# Patient Record
Sex: MALE | Race: WHITE | Employment: OTHER | URBAN - METROPOLITAN AREA
[De-identification: names, ages, dates, MRNs, and addresses within clinical notes are randomized per-mention and may not be internally consistent; named-entity substitution may affect disease eponyms.]

---

## 2017-01-03 ENCOUNTER — HOSPITAL ENCOUNTER (OUTPATIENT)
Dept: LAB | Age: 72
Discharge: HOME | End: 2017-01-03
Payer: MEDICARE

## 2017-01-03 DIAGNOSIS — R97.20 ELEVATED PROSTATE SPECIFIC ANTIGEN (PSA): ICD-10-CM

## 2017-01-03 LAB
ALBUMIN SERPL BCP-MCNC: 3.9 G/DL (ref 3.2–4.6)
ALBUMIN/GLOB SERPL: 1.2 {RATIO} (ref 1.2–3.5)
ALP SERPL-CCNC: 58 U/L (ref 50–136)
ALT SERPL-CCNC: 47 U/L (ref 12–65)
ANION GAP BLD CALC-SCNC: 6 MMOL/L (ref 7–16)
AST SERPL W P-5'-P-CCNC: 23 U/L (ref 15–37)
BASOPHILS # BLD AUTO: 0.1 K/UL (ref 0–0.2)
BASOPHILS # BLD: 1 % (ref 0–2)
BILIRUB SERPL-MCNC: 0.8 MG/DL (ref 0.2–1.1)
BUN SERPL-MCNC: 23 MG/DL (ref 8–23)
CALCIUM SERPL-MCNC: 9.4 MG/DL (ref 8.3–10.4)
CHLORIDE SERPL-SCNC: 105 MMOL/L (ref 98–107)
CO2 SERPL-SCNC: 32 MMOL/L (ref 23–32)
CREAT SERPL-MCNC: 1.05 MG/DL (ref 0.8–1.5)
DIFFERENTIAL METHOD BLD: ABNORMAL
EOSINOPHIL # BLD: 0 K/UL (ref 0–0.8)
EOSINOPHIL NFR BLD: 1 % (ref 0.5–7.8)
ERYTHROCYTE [DISTWIDTH] IN BLOOD BY AUTOMATED COUNT: 12.6 % (ref 11.9–14.6)
GLOBULIN SER CALC-MCNC: 3.2 G/DL (ref 2.3–3.5)
GLUCOSE SERPL-MCNC: 125 MG/DL (ref 65–100)
HCT VFR BLD AUTO: 43.8 % (ref 41.1–50.3)
HGB BLD-MCNC: 14.5 G/DL (ref 13.6–17.2)
LYMPHOCYTES # BLD AUTO: 29 % (ref 13–44)
LYMPHOCYTES # BLD: 1.9 K/UL (ref 0.5–4.6)
MCH RBC QN AUTO: 30.3 PG (ref 26.1–32.9)
MCHC RBC AUTO-ENTMCNC: 33.1 G/DL (ref 31.4–35)
MCV RBC AUTO: 91.6 FL (ref 79.6–97.8)
MONOCYTES # BLD: 0.4 K/UL (ref 0.1–1.3)
MONOCYTES NFR BLD AUTO: 6 % (ref 4–12)
NEUTS SEG # BLD: 4.3 K/UL (ref 1.7–8.2)
NEUTS SEG NFR BLD AUTO: 64 % (ref 43–78)
NRBC # BLD: 0 K/UL (ref 0–0.2)
PLATELET # BLD AUTO: 258 K/UL (ref 150–450)
PMV BLD AUTO: 9.8 FL (ref 10.8–14.1)
POTASSIUM SERPL-SCNC: 3.4 MMOL/L (ref 3.5–5.1)
PROT SERPL-MCNC: 7.1 G/DL (ref 6.3–8.2)
PSA SERPL-MCNC: 0.6 NG/ML
RBC # BLD AUTO: 4.78 M/UL (ref 4.23–5.67)
SODIUM SERPL-SCNC: 143 MMOL/L (ref 136–145)
WBC # BLD AUTO: 6.7 K/UL (ref 4.3–11.1)

## 2017-01-03 PROCEDURE — 85025 COMPLETE CBC W/AUTO DIFF WBC: CPT | Performed by: INTERNAL MEDICINE

## 2017-01-03 PROCEDURE — 36415 COLL VENOUS BLD VENIPUNCTURE: CPT | Performed by: INTERNAL MEDICINE

## 2017-01-03 PROCEDURE — 84153 ASSAY OF PSA TOTAL: CPT | Performed by: INTERNAL MEDICINE

## 2017-01-03 PROCEDURE — 80053 COMPREHEN METABOLIC PANEL: CPT | Performed by: INTERNAL MEDICINE

## 2017-01-12 ENCOUNTER — HOSPITAL ENCOUNTER (OUTPATIENT)
Dept: MRI IMAGING | Age: 72
Discharge: HOME | End: 2017-01-12
Attending: INTERNAL MEDICINE
Payer: MEDICARE

## 2017-01-12 DIAGNOSIS — M25.552 PAIN OF LEFT HIP JOINT: ICD-10-CM

## 2017-01-12 PROCEDURE — 73723 MRI JOINT LWR EXTR W/O&W/DYE: CPT

## 2017-01-12 PROCEDURE — 74011250636 HC RX REV CODE- 250/636: Performed by: INTERNAL MEDICINE

## 2017-01-12 PROCEDURE — A9577 INJ MULTIHANCE: HCPCS | Performed by: INTERNAL MEDICINE

## 2017-01-12 RX ORDER — SODIUM CHLORIDE 0.9 % (FLUSH) 0.9 %
10 SYRINGE (ML) INJECTION
Status: COMPLETED | OUTPATIENT
Start: 2017-01-12 | End: 2017-01-12

## 2017-01-12 RX ADMIN — GADOBENATE DIMEGLUMINE 7 ML: 529 INJECTION, SOLUTION INTRAVENOUS at 08:54

## 2017-01-12 RX ADMIN — Medication 10 ML: at 08:54

## 2017-01-17 ENCOUNTER — HOSPITAL ENCOUNTER (OUTPATIENT)
Dept: LAB | Age: 72
Discharge: HOME | End: 2017-01-17
Payer: MEDICARE

## 2017-01-17 ENCOUNTER — HOSPITAL ENCOUNTER (OUTPATIENT)
Dept: INFUSION THERAPY | Age: 72
Discharge: HOME | End: 2017-01-17
Payer: MEDICARE

## 2017-01-17 DIAGNOSIS — C61 PROSTATE CANCER (HCC): ICD-10-CM

## 2017-01-17 DIAGNOSIS — R97.20 ELEVATED PROSTATE SPECIFIC ANTIGEN (PSA): ICD-10-CM

## 2017-01-17 LAB
ALBUMIN SERPL BCP-MCNC: 3.8 G/DL (ref 3.2–4.6)
ALBUMIN/GLOB SERPL: 1.2 {RATIO} (ref 1.2–3.5)
ALP SERPL-CCNC: 62 U/L (ref 50–136)
ALT SERPL-CCNC: 34 U/L (ref 12–65)
ANION GAP BLD CALC-SCNC: 8 MMOL/L (ref 7–16)
AST SERPL W P-5'-P-CCNC: 34 U/L (ref 15–37)
BASOPHILS # BLD AUTO: 0.1 K/UL (ref 0–0.2)
BASOPHILS # BLD: 1 % (ref 0–2)
BILIRUB SERPL-MCNC: 1.3 MG/DL (ref 0.2–1.1)
BUN SERPL-MCNC: 19 MG/DL (ref 8–23)
CALCIUM SERPL-MCNC: 9.4 MG/DL (ref 8.3–10.4)
CHLORIDE SERPL-SCNC: 106 MMOL/L (ref 98–107)
CO2 SERPL-SCNC: 30 MMOL/L (ref 23–32)
CREAT SERPL-MCNC: 1.06 MG/DL (ref 0.8–1.5)
DIFFERENTIAL METHOD BLD: ABNORMAL
EOSINOPHIL # BLD: 0.1 K/UL (ref 0–0.8)
EOSINOPHIL NFR BLD: 1 % (ref 0.5–7.8)
ERYTHROCYTE [DISTWIDTH] IN BLOOD BY AUTOMATED COUNT: 12.7 % (ref 11.9–14.6)
GLOBULIN SER CALC-MCNC: 3.2 G/DL (ref 2.3–3.5)
GLUCOSE SERPL-MCNC: 106 MG/DL (ref 65–100)
HCT VFR BLD AUTO: 45.7 % (ref 41.1–50.3)
HGB BLD-MCNC: 15 G/DL (ref 13.6–17.2)
LYMPHOCYTES # BLD AUTO: 27 % (ref 13–44)
LYMPHOCYTES # BLD: 1.8 K/UL (ref 0.5–4.6)
MCH RBC QN AUTO: 30.5 PG (ref 26.1–32.9)
MCHC RBC AUTO-ENTMCNC: 32.8 G/DL (ref 31.4–35)
MCV RBC AUTO: 93.1 FL (ref 79.6–97.8)
MONOCYTES # BLD: 0.4 K/UL (ref 0.1–1.3)
MONOCYTES NFR BLD AUTO: 6 % (ref 4–12)
NEUTS SEG # BLD: 4.3 K/UL (ref 1.7–8.2)
NEUTS SEG NFR BLD AUTO: 65 % (ref 43–78)
NRBC # BLD: 0 K/UL (ref 0–0.2)
PLATELET # BLD AUTO: 259 K/UL (ref 150–450)
PMV BLD AUTO: 10 FL (ref 10.8–14.1)
POTASSIUM SERPL-SCNC: 4.2 MMOL/L (ref 3.5–5.1)
PROT SERPL-MCNC: 7 G/DL (ref 6.3–8.2)
PSA SERPL-MCNC: 0.5 NG/ML
RBC # BLD AUTO: 4.91 M/UL (ref 4.23–5.67)
SODIUM SERPL-SCNC: 144 MMOL/L (ref 136–145)
WBC # BLD AUTO: 6.6 K/UL (ref 4.3–11.1)

## 2017-01-17 PROCEDURE — 74011250636 HC RX REV CODE- 250/636: Performed by: INTERNAL MEDICINE

## 2017-01-17 PROCEDURE — 96402 CHEMO HORMON ANTINEOPL SQ/IM: CPT

## 2017-01-17 RX ADMIN — LEUPROLIDE ACETATE 22.5 MG: KIT at 12:10

## 2017-01-17 NOTE — PROGRESS NOTES
Arrived to the [facility]. Lupron completed. Patient tolerated well. Any issues or concerns during appointment:none. Discharged ambulatory.

## 2017-01-17 NOTE — PROGRESS NOTES
Problem: Knowledge Deficit  Goal: *Participate in the learning process  Outcome: Progressing Towards Goal  Reviewed POC.

## 2017-01-27 ENCOUNTER — HOSPITAL ENCOUNTER (OUTPATIENT)
Dept: RADIATION ONCOLOGY | Age: 72
Discharge: HOME | End: 2017-01-27
Payer: MEDICARE

## 2017-01-27 VITALS
HEART RATE: 78 BPM | BODY MASS INDEX: 24.56 KG/M2 | WEIGHT: 156.8 LBS | SYSTOLIC BLOOD PRESSURE: 156 MMHG | TEMPERATURE: 98.6 F | RESPIRATION RATE: 18 BRPM | OXYGEN SATURATION: 99 % | DIASTOLIC BLOOD PRESSURE: 105 MMHG

## 2017-01-27 DIAGNOSIS — C61 PROSTATE CANCER (HCC): Primary | ICD-10-CM

## 2017-01-27 PROCEDURE — 99211 OFF/OP EST MAY X REQ PHY/QHP: CPT

## 2017-01-27 RX ORDER — AMLODIPINE AND BENAZEPRIL HYDROCHLORIDE 10; 20 MG/1; MG/1
1 CAPSULE ORAL DAILY
COMMUNITY
End: 2017-02-10 | Stop reason: ALTCHOICE

## 2017-01-27 NOTE — CONSULTS
Patient: Ridge Novak MRN: [id number]  SSN: [id number]    YOB: 1945  Age: 73 y.o. Sex: male      Other Providers:  Jo Blakely MD, Josse Singh MD    CHIEF COMPLAINT: Prostate cancer    DIAGNOSIS: High risk prostate cancer, GS 4+5=9. 5/12 cores. PSA 11.9    PREVIOUS TREATMENT:  1) Watchful waiting after biopsy 2015  2) Repeat biopsy   3) 10/13/16:  Lupron 22.5 mg    HISTORY OF PRESENT ILLNESS:  Ridge Novak is a 73 y.o. male who I am seeing at the request of Dr. Anne Bell. He is a patient of Dr. Priti Cuellar, previously followed by Dr. Olivia Matute at [facility]. He originally had a diagnosis of prostate cancer in 2015 when a biopsy showed Inglis 6 disease in 1 core, 45%, with a PSA of 4.7, volume 21 cc. He elected for watchful waiting at that time. PSA at initial biopsy 4.7  Feb 2016 6.4  May 2016 5.9  Sept 2016 11.9    In 2016, he transferred care to Dr. Priti Cuellar and PSA devin to 7.34, then to 11.9, prompting repeat biopsy of the prostate that then showed Inglis 4+5 disease. Biopsy showed GS 4+5=9 in 3 cores between 35-45%, 1 core GS 4+4=8 in 35%, and 1 core 4+3=7 in 35% for a total of 5/12 cores. He was also having some right hip pain and pelvic discomfort which prompted systemic restaging. Bone scan revealed some small suspicious uptake in the sternum and in the left proximal femur, with corresponding findings on CT, along with an asymmetrically enlarged right pelvic sidewall internal iliac lymph node measuring 17 mm. These findings were felt to most likely represent metastatic disease. Dr. Priti Cuellar started him on Lupron with a 22.5 mg dose given on 10/3/16. He was referred to medical oncology for evaluation and met with Dr. Anne Bell. He recommended short term observation with repeat CT and bone scan after 3 months to determine whether the lesions responded to ADT, which would suggest stage IV disease. CT showed resolution of pelvic adenopathy but bone lesions were stable.  PSA was 0.6 consistent with excellent biochemical response but he continued to have pain in the left hip still. Dr. Patricia Vasquez was entertaining the possibilty that the bone lesions were not metastases. He chose to obtain MRI of the left hip, and if consistent with a nonneoplastic etiology which was seen 1/12/17, he recommend rad onc referral for consideration of local therapy which is the rationale for our visit today. He is having hot flashes and failed Effexor with changes in his blood pressure. He is able to play golf regularly and would prefer to avoid treatment if possible. GENITOURINARY ROS:  AUA 6. No major issues. He is able to get an erection. PAST MEDICAL HISTORY:    Past Medical History   Diagnosis Date    Elevated prostate specific antigen (PSA) 1/15/2014    Hypertension     Hypertrophy of prostate with urinary obstruction and other lower urinary tract symptoms (LUTS) 1/15/2014       The patient denies history of collagen vascular diseases, pacemaker insertion, prior radiation or prior chemotherapy. PAST SURGICAL HISTORY:   Past Surgical History   Procedure Laterality Date    Hx colonoscopy      Pr prostate biopsy, needle, saturation sampling      Hx other surgical Right      squamous cell CA upper lip    Hx malignant skin lesion excision       removed from neck       MEDICATIONS:     Current Outpatient Prescriptions:     amLODIPine-benazepril (LOTREL) 10-20 mg per capsule, Take 1 Cap by mouth daily. , Disp: , Rfl:     aspirin delayed-release 81 mg tablet, Take  by mouth daily. , Disp: , Rfl:     zolpidem (AMBIEN) 5 mg tablet, Take  by mouth nightly as needed for Sleep., Disp: , Rfl:     LORazepam (ATIVAN) 1 mg tablet, Take 1 Tab by mouth every six (6) hours as needed for Anxiety. Max Daily Amount: 4 mg.  Indications: ANXIETY, Disp: 50 Tab, Rfl: 0    ranitidine (ZANTAC) 150 mg tablet, , Disp: , Rfl: 1    GLUC ALLEN/CHONDRO ALLEN A/VIT C/MN (GLUCOSAMINE 1500 COMPLEX PO), Take  by mouth., Disp: , Rfl:    Hydrochlorothiazide 12.5 mg tablet, Take 12.5 mg by mouth daily. , Disp: , Rfl:     MULTIVITAMIN WITH MINERALS (MULTIPLE VITAMIN-MINERALS PO), Take  by mouth., Disp: , Rfl:     ALLERGIES:   No Known Allergies    SOCIAL HISTORY:   Social History     Social History    Marital status:      Spouse name: N/A    Number of children: N/A    Years of education: N/A     Occupational History    Not on file. Social History Main Topics    Smoking status: Never Smoker    Smokeless tobacco: Never Used    Alcohol use 0.0 oz/week     0 Standard drinks or equivalent per week    Drug use: Not on file    Sexual activity: Not on file     Other Topics Concern    Not on file     Social History Narrative       FAMILY HISTORY:   Family History   Problem Relation Age of Onset    Hypertension Other      gen fam HX       REVIEW OF SYSTEMS: Please see the completed review of systems sheet in the chart that I have reviewed today. PHYSICAL EXAMINATION:   ECOG Performance status 1  VITAL SIGNS:   Visit Vitals    BP (!) 156/105    Pulse 78    Temp 98.6 °F (37 °C)    Resp 18    Wt 71.1 kg (156 lb 12.8 oz)    SpO2 99%    BMI 24.56 kg/m2        GENERAL: The patient is well-developed, ambulatory, alert and in no acute distress. HEENT: Head is normocephalic, atraumatic. Pupils are equal, round and reactive to light and accommodation. Extraocular movement intact. Hearing is intact bilaterally to finger rub. Oral cavity reveals no lesions. Mucous membranes are moist. NECK: Neck is supple with no masses. CARDIOVASCULAR: Heart is regular rate and rhythm. There are no murmurs rubs or gallups. Radial pulses are 2+ RESPIRATORY: Lungs are clear to auscultation and percussion. There is normal respiratory effort. GASTROINTESTINAL: The abdomen is soft, non-tender, nondistended with no hepatospelnomagaly. Digital rectal examination: Smooth prostate gland and rectal vault with anodular prostate.  LYMPHATIC: There is no cervical, supraclavicular or axillary lymphadenopathy bilaterally. MUSCULOSKELETAL: Extremities reveal no cyanosis, clubbing or edema.  is 5+/5. NEURO:  Cranial nerves II-XII grossly intact. Muscular strength and sensation are intact throughout all four extremities. PATHOLOGY:    9/19/16:      DIAGNOSIS   A: \"PROSTATE, LEFT LATERAL BASE, BIOPSY\": PROSTATIC TISSUE WITH CHRONIC INFLAMMATION. B: \"PROSTATE, LEFT LATERAL MID, BIOPSY\": PROSTATIC TISSUE WITH CHRONIC INFLAMMATION. C: \"PROSTATE, LEFT LATERAL APEX, BIOPSY\": PROSTATIC TISSUE WITH FOCAL ACUTE AND CHRONIC INFLAMMATION. D: \"PROSTATE, LEFT BASE, BIOPSY\": PROSTATIC TISSUE WITH CHRONIC INFLAMMATION. E: \"PROSTATE, LEFT MID, BIOPSY\": PROSTATITIC TISSUE WITH FOCAL ACUTE AND CHRONIC INFLAMMATION. F: \"PROSTATE, LEFT APEX, BIOPSY\": PROSTATIC TISSUE, NO CARCINOMA IDENTIFIED. G: \"PROSTATE, RIGHT BASE, BIOPSY\": PROSTATIC ADENOCARCINOMA, DEBBIE SCORE   4 + 5 = 9, INVOLVING 40% OF BIOPSY. PERINEURAL INVASION PRESENT. H: \"PROSTATE, RIGHT MID, BIOPSY\": PROSTATIC ADENOCARCINOMA, DEBBIE SCORE   4 + 4 = 8, INVOLVING 35% OF BIOPSY. I: \"PROSTATE, RIGHT APEX, BIOPSY\": PROSTATIC TISSUE WITH A SMALL FOCUS OF ATYPICAL GLANDS SUSPICIOUS FOR CARCINOMA. J: \"PROSTATE, RIGHT LATERAL BASE, BIOPSY\": PROSTATIC ADENOCARCINOMA, DEBBIE SCORE 4 + 5 = 9, INVOLVING 45% OF BIOPSY. SUSPICIOUS FOR PERINEURAL INVASION. K: \"PROSTATE, RIGHT LATERAL MID, BIOPSY\": PROSTATIC ADENOCARCINOMA, DEBBIE SCORE 4 + 5 = 9, INVOLVING 45% OF BIOPSY. L: \"PROSTATE, RIGHT LATERAL APEX, BIOPSY\": PROSTATIC ADENOCARCINOMA, DEBBIE SCORE 4 + 3 = 7, INVOLVING 35% OF BIOPSY.        LABORATORY:   Lab Results   Component Value Date/Time    Sodium 144 01/17/2017 10:37 AM    Potassium 4.2 01/17/2017 10:37 AM    Chloride 106 01/17/2017 10:37 AM    CO2 30 01/17/2017 10:37 AM    Anion gap 8 01/17/2017 10:37 AM    Glucose 106 01/17/2017 10:37 AM    BUN 19 01/17/2017 10:37 AM    Creatinine 1.06 01/17/2017 10:37 AM    GFR est AA >60 01/17/2017 10:37 AM    GFR est non-AA >60 01/17/2017 10:37 AM    Calcium 9.4 01/17/2017 10:37 AM    Albumin 3.8 01/17/2017 10:37 AM    Protein, total 7.0 01/17/2017 10:37 AM    Globulin 3.2 01/17/2017 10:37 AM    A-G Ratio 1.2 01/17/2017 10:37 AM    AST 34 01/17/2017 10:37 AM    ALT 34 01/17/2017 10:37 AM     Lab Results   Component Value Date/Time    WBC 6.6 01/17/2017 10:37 AM    HGB 15.0 01/17/2017 10:37 AM    HCT 45.7 01/17/2017 10:37 AM    PLATELET 761 10/93/7162 10:37 AM       RADIOLOGY:    Nm Bone Scan Wh Body    Result Date: 12/27/2016  Whole-body bone scan INDICATION:  Restaging prostate cancer Whole-body images were obtained after intravenous injection of 26.4 mCi of technetium HDP. COMPARISON:  09/29/2016 FINDINGS:  There is stable focal uptake in the intertrochanteric left hip. Uptake in the midsternum is also stable. There are no developing lesions in the ribs or spine. IMPRESSION:  1.  Stable uptake in the left hip, concerning for metastatic disease. CT appearance is nonspecific, this could also represent a benign chondroid lesion. 2.  Stable uptake in the midsternum. This is also indeterminate, benign uptake can be seen in this region. Mri Hip Ardyth Labs Wo Cont    Result Date: 1/12/2017  History: Severe left hip pain. History of prostate cancer diagnosed one year ago. EXAM: MRI left hip with and without contrast TECHNIQUE: Multiplanar multisequence imaging is performed both before and after the uneventful ministration of 7 cc MultiHance. COMPARISON: Bone scan dated 12/27/2016 FINDINGS: The abnormal focus of uptake on the recent bone scan corresponds to a lobular area of increased T2 signal within the posterior aspect of the intertrochanteric portion of the left femur. This lesion most likely represents an enchondroma. No additional focal marrow lesions demonstrated. Degenerative disc signal noted in the low lumbar spine and pubic symphysis.  There is normal signal at the common hamstring tendon origin and at the gluteal muscular insertion. There is a tear of the anterior superior acetabular labrum. No abnormal fluid collection or soft tissue mass. IMPRESSION: 1. The abnormality seen on the bone scan corresponds to an enchondroma within the intertrochanteric portion of the left femur posteriorly. 2. Tear of the anterior superior acetabular labrum on the left. 3. Degenerative changes of the low lumbar spine and pubic symphysis. 4. No osseous metastases seen within the left hip or pelvis. Ct Pelv W Cont    Addendum Date: 12/27/2016    Addendum: Addendum: The sclerotic, intertrochanteric left hip lesion is stable. CT appearance is nonspecific. While this could represent a bony metastatic lesion, could also represent in benign chondroid lesion. Result Date: 12/27/2016  CT of the pelvis INDICATION:  Prostate cancer Multiple axial images were obtained through the pelvis after intravenous injection of 100mL of Isovue 370. Radiation dose reduction techniques were used for this study: All CT scans performed at this facility use one or all of the following: Automated exposure control, adjustment of the mA and/or kVp according to patient's size, iterative reconstruction. Compared with 09/29/2016. FINDINGS: The right pelvic sidewall lymph node described on the prior exam has completely resorbed. No residual mass or adenopathy is seen in the pelvis. The prostate is stable in size and appearance. No discrete mass is seen. There are no inflammatory changes or fluid collections in the pelvis. There are no significant bony lesions. IMPRESSION: Interval resolution of right pelvic adenopathy     IMPRESSION:  Brad Lewis is a 73 y.o. male with initially felt to be metastatic prostate cancer but at this point with no changes in his bone findings, more consistent with high risk prostate cancer. The natural history of prostate cancer was reviewed with the patient.  Prognostic features including stage, Michelle score, age, race, and PSA were reviewed. Treatment options for prostate cancer including active surveillance, hormonal therapy, radiotherapy, and surgery were compared and contrasted with regard to outcome and quality of life. I discussed the radiotherapy options including low-dose rate brachytherapy, high-dose-rate brachytherapy, and external beam radiation therapy. We also discussed the addition of hormone therapy. Side effects and complications of radiation therapy including fatigue, urinary obstructive symptoms, rectal irritation and bleeding, and decline of sexual function were among the complications highlighted. He asked many questions which were answered to his satisfaction. For high risk disease, there are several viable treatment options but I generally do not advise active surveillance. Surgery with radical prostatectomy or IMRT examination with a protracted course of androgen deprivation therapy (2-3 years) are appropriate options. I advised the patient each of these options offer similar tumor control with differences found mainly in varying toxicity profiles. IMRT is given as a single modality treatment or as initial treatment prior to an HDR boost.  A total of 45 Gy would be delivered to the pelvis and prostate gland followed by a 15 gray in 1 fraction HDR boost to the prostate gland alone. For patients who are not candidates for brachytherapy either by poor anatomy meaning substantial arch interference or a prostate gland outside of 20-60 cc where good dosimetry is often not achievable, IMRT is given for the entirety of the treatment course. Unfortunately we would not be able to boost his lymph node region so HDR would not be appropriate for him. For these reasons I feel fractionated IMRT should be delivered.   We also discussed the new and emerging data on SpaceOAR or Augmenix which is a water based gel placed between the rectum and prostate which has been shown in clinical trials to spare GI toxicity. This is placed at time of fiducial markers. I would like to complete staging MRI to see if he is a candidate for spaceOAR then have this placed if amenable. Following this procedure, we would proceed with fractionated radiation over 9 weeks. He was agreeable with that plan. I will see him back in 2 weeks after MRI and presentation in conference. PLAN:    1) Discussed viable treatment options focusing on external beam radiation highlighting the risks, benefits, and side effects from treatment. 2) Reviewed available research treatment and cancer care protocols for which patient may be eligible. Unfortunately there are no matching clinical trials available at this time. 3) Further staging to include MRI to see if he is a candidate for spaceOAR. 4) Patient will be scheduled for follow up after presentation in tumor board and subsequent staging scans before making a treatment decision.        Merle Hoskins MD   January 27, 2017

## 2017-01-27 NOTE — PROGRESS NOTES
Pt here for initial RT visit for prostate cancer. According to the MD notes, pt is not interested in surgery. Lupron was started on 10/3/16. His 1/17/17 PSA was 0.5. Pt completed the AUA symptom  with a score of 6, and is c/o mild urinary symptoms of frequency, hesitancy, weak stream, and nocturia. Pt will have an MRI Pelvis, be presented at [address] [place] [address], and return on 2/10/17 to discuss treatment options, including the possibility of SpaceOAR. Pt is aware of the [facility] appt, and that he will be called by a  for his MRI.

## 2017-02-01 ENCOUNTER — HOSPITAL ENCOUNTER (OUTPATIENT)
Dept: MRI IMAGING | Age: 72
Discharge: HOME | End: 2017-02-01
Attending: RADIOLOGY
Payer: MEDICARE

## 2017-02-01 DIAGNOSIS — C61 PROSTATE CANCER (HCC): ICD-10-CM

## 2017-02-01 PROCEDURE — 72197 MRI PELVIS W/O & W/DYE: CPT

## 2017-02-01 PROCEDURE — A9577 INJ MULTIHANCE: HCPCS | Performed by: RADIOLOGY

## 2017-02-01 PROCEDURE — 74011000258 HC RX REV CODE- 258: Performed by: RADIOLOGY

## 2017-02-01 PROCEDURE — 74011250636 HC RX REV CODE- 250/636: Performed by: RADIOLOGY

## 2017-02-01 RX ORDER — SODIUM CHLORIDE 0.9 % (FLUSH) 0.9 %
10 SYRINGE (ML) INJECTION
Status: COMPLETED | OUTPATIENT
Start: 2017-02-01 | End: 2017-02-01

## 2017-02-01 RX ADMIN — GADOBENATE DIMEGLUMINE 10 ML: 529 INJECTION, SOLUTION INTRAVENOUS at 10:27

## 2017-02-01 RX ADMIN — SODIUM CHLORIDE 100 ML: 900 INJECTION, SOLUTION INTRAVENOUS at 10:27

## 2017-02-01 RX ADMIN — Medication 10 ML: at 10:27

## 2017-02-10 ENCOUNTER — APPOINTMENT (OUTPATIENT)
Dept: RADIATION ONCOLOGY | Age: 72
End: 2017-02-10

## 2017-02-10 ENCOUNTER — HOSPITAL ENCOUNTER (OUTPATIENT)
Dept: RADIATION ONCOLOGY | Age: 72
Discharge: HOME | End: 2017-02-10
Payer: MEDICARE

## 2017-02-10 VITALS
OXYGEN SATURATION: 100 % | WEIGHT: 158.2 LBS | SYSTOLIC BLOOD PRESSURE: 154 MMHG | BODY MASS INDEX: 24.78 KG/M2 | HEART RATE: 59 BPM | DIASTOLIC BLOOD PRESSURE: 97 MMHG | TEMPERATURE: 98 F

## 2017-02-10 DIAGNOSIS — C61 PROSTATE CANCER (HCC): Primary | ICD-10-CM

## 2017-02-10 PROCEDURE — 77470 SPECIAL RADIATION TREATMENT: CPT

## 2017-02-10 PROCEDURE — 99211 OFF/OP EST MAY X REQ PHY/QHP: CPT

## 2017-02-10 RX ORDER — HYDROCHLOROTHIAZIDE 25 MG/1
12.5 TABLET ORAL
COMMUNITY
End: 2017-07-05 | Stop reason: ALTCHOICE

## 2017-02-10 RX ORDER — CARVEDILOL 6.25 MG/1
6.25 TABLET ORAL 2 TIMES DAILY WITH MEALS
COMMUNITY
End: 2017-07-05 | Stop reason: ALTCHOICE

## 2017-02-10 NOTE — NURSE NAVIGATOR
Pt here for f/u for Prostate cancer. Lupron injection 10-13-16, 1-17-17. S/p MDC. MRI pelvis  2-1-17-negative for mets. F/u Dr. Yoko Garcia 2-23-17. F/u Dr. Katie Hayward 4-18-17.     Rosamaria Craft, RN

## 2017-02-10 NOTE — PROGRESS NOTES
Patient: Matteo Humphrey MRN: [id number]  SSN: [id number]    YOB: 1945  Age: 73 y.o. Sex: male      Other Providers:  Marcie Elliott MD, Dennis Clifton MD    DIAGNOSIS: High risk prostate cancer, GS 4+5=9. 5/12 cores. PSA 11.9    PREVIOUS TREATMENT:  1) Watchful waiting after biopsy 2015  2) Repeat biopsy   3) 10/13/16:  Lupron 22.5 mg    HISTORY OF PRESENT ILLNESS:  Matteo Humphrey is a 73 y.o. male who I am seeing at the request of Dr. Cristela Montalvo after original consultation 1/27/17. He is a patient of Dr. Wilma Nobles, previously followed by Dr. Vinod Beck at [facility]. He originally had a diagnosis of prostate cancer in 2015 when a biopsy showed Magnolia 6 disease in 1 core, 45%, with a PSA of 4.7, volume 21 cc. He elected for watchful waiting at that time. PSA at initial biopsy 4.7  Feb 2016 6.4  May 2016 5.9  Sept 2016 11.9    In 2016, he transferred care to Dr. Wilma Nobles and PSA devin to 7.34, then to 11.9, prompting repeat biopsy of the prostate that then showed Magnolia 4+5 disease. Biopsy showed GS 4+5=9 in 3 cores between 35-45%, 1 core GS 4+4=8 in 35%, and 1 core 4+3=7 in 35% for a total of 5/12 cores. He was also having some right hip pain and pelvic discomfort which prompted systemic restaging. Bone scan revealed some small suspicious uptake in the sternum and in the left proximal femur, with corresponding findings on CT, along with an asymmetrically enlarged right pelvic sidewall internal iliac lymph node measuring 17 mm. These findings were felt to most likely represent metastatic disease. Dr. Wilma Nobles started him on Lupron with a 22.5 mg dose given on 10/3/16. He was referred to medical oncology for evaluation and met with Dr. Cristela Montalvo. He recommended short term observation with repeat CT and bone scan after 3 months to determine whether the lesions responded to ADT, which would suggest stage IV disease. CT showed resolution of pelvic adenopathy but bone lesions were stable.  PSA was 0.6 consistent with excellent biochemical response but he continued to have pain in the left hip still. Dr. Chavis Friday was entertaining the possibilty that the bone lesions were not metastases. He chose to obtain MRI of the left hip, and if consistent with a nonneoplastic etiology which was seen 1/12/17, he recommend rad onc referral for consideration of local therapy. At that time, he was having hot flashes and failed Effexor with changes in his blood pressure. He wasable to play golf regularly and would prefer to avoid treatment if possible. These sentiments are the same today. I did recommend treatment and presented him in conference with the board in agreement. He is back today to continue the discussion and plan for radiation. GENITOURINARY ROS:  AUA 6 pretreatment. No major issues. He is able to get an erection. PAST MEDICAL HISTORY:    Past Medical History   Diagnosis Date    Elevated prostate specific antigen (PSA) 1/15/2014    Hypertension     Hypertrophy of prostate with urinary obstruction and other lower urinary tract symptoms (LUTS) 1/15/2014       The patient denies history of collagen vascular diseases, pacemaker insertion, prior radiation or prior chemotherapy. PAST SURGICAL HISTORY:   Past Surgical History   Procedure Laterality Date    Hx colonoscopy      Pr prostate biopsy, needle, saturation sampling      Hx other surgical Right      squamous cell CA upper lip    Hx malignant skin lesion excision       removed from neck       MEDICATIONS:     Current Outpatient Prescriptions:     hydroCHLOROthiazide (HYDRODIURIL) 25 mg tablet, Take 25 mg by mouth daily. , Disp: , Rfl:     carvedilol (COREG) 6.25 mg tablet, Take  by mouth two (2) times daily (with meals). , Disp: , Rfl:     aspirin delayed-release 81 mg tablet, Take  by mouth daily. , Disp: , Rfl:     zolpidem (AMBIEN) 5 mg tablet, Take  by mouth nightly as needed for Sleep., Disp: , Rfl:     LORazepam (ATIVAN) 1 mg tablet, Take 1 Tab by mouth every six (6) hours as needed for Anxiety. Max Daily Amount: 4 mg. Indications: ANXIETY (Patient taking differently: Take 0.5 mg by mouth every six (6) hours as needed for Anxiety. Indications: ANXIETY), Disp: 50 Tab, Rfl: 0    ranitidine (ZANTAC) 150 mg tablet, Take 150 mg by mouth nightly., Disp: , Rfl: 1    GLUC ALLEN/CHONDRO ALLEN A/VIT C/MN (GLUCOSAMINE 1500 COMPLEX PO), Take 1 Tab by mouth daily. , Disp: , Rfl:     MULTIVITAMIN WITH MINERALS (MULTIPLE VITAMIN-MINERALS PO), Take 0.5 Tabs by mouth daily. , Disp: , Rfl:     ALLERGIES:   No Known Allergies    SOCIAL HISTORY:   Social History     Social History    Marital status:      Spouse name: N/A    Number of children: N/A    Years of education: N/A     Occupational History    Not on file. Social History Main Topics    Smoking status: Former Smoker     Types: Cigars    Smokeless tobacco: Never Used    Alcohol use 0.0 oz/week     0 Standard drinks or equivalent per week    Drug use: Not on file    Sexual activity: Not on file     Other Topics Concern    Not on file     Social History Narrative       FAMILY HISTORY:   Family History   Problem Relation Age of Onset    Hypertension Other      gen fam HX       REVIEW OF SYSTEMS: Please see the completed review of systems sheet in the chart that I have reviewed today. PHYSICAL EXAMINATION:   ECOG Performance status 1  VITAL SIGNS:   Visit Vitals    BP (!) 154/97 (BP 1 Location: Left arm, BP Patient Position: Sitting)    Pulse (!) 59    Temp 98 °F (36.7 °C) (Oral)    Wt 71.8 kg (158 lb 3.2 oz)    SpO2 100%    BMI 24.78 kg/m2        GENERAL: The patient is well-developed, ambulatory, alert and in no acute distress. HEENT: Head is normocephalic, atraumatic. Pupils are equal, round and reactive to light and accommodation. Extraocular movement intact. Hearing is intact bilaterally to finger rub. Oral cavity reveals no lesions.  Mucous membranes are moist. NECK: Neck is supple with no masses. CARDIOVASCULAR: Heart is regular rate and rhythm. There are no murmurs rubs or gallups. Radial pulses are 2+ RESPIRATORY: Lungs are clear to auscultation and percussion. There is normal respiratory effort. GASTROINTESTINAL: The abdomen is soft, non-tender, nondistended with no hepatospelnomagaly. Digital rectal examination: COPIED FROM PRIOR - Smooth prostate gland and rectal vault with anodular prostate. PATHOLOGY:    9/19/16:      DIAGNOSIS   A: \"PROSTATE, LEFT LATERAL BASE, BIOPSY\": PROSTATIC TISSUE WITH CHRONIC INFLAMMATION. B: \"PROSTATE, LEFT LATERAL MID, BIOPSY\": PROSTATIC TISSUE WITH CHRONIC INFLAMMATION. C: \"PROSTATE, LEFT LATERAL APEX, BIOPSY\": PROSTATIC TISSUE WITH FOCAL ACUTE AND CHRONIC INFLAMMATION. D: \"PROSTATE, LEFT BASE, BIOPSY\": PROSTATIC TISSUE WITH CHRONIC INFLAMMATION. E: \"PROSTATE, LEFT MID, BIOPSY\": PROSTATITIC TISSUE WITH FOCAL ACUTE AND CHRONIC INFLAMMATION. F: \"PROSTATE, LEFT APEX, BIOPSY\": PROSTATIC TISSUE, NO CARCINOMA IDENTIFIED. G: \"PROSTATE, RIGHT BASE, BIOPSY\": PROSTATIC ADENOCARCINOMA, DEBBIE SCORE   4 + 5 = 9, INVOLVING 40% OF BIOPSY. PERINEURAL INVASION PRESENT. H: \"PROSTATE, RIGHT MID, BIOPSY\": PROSTATIC ADENOCARCINOMA, DEBBIE SCORE   4 + 4 = 8, INVOLVING 35% OF BIOPSY. I: \"PROSTATE, RIGHT APEX, BIOPSY\": PROSTATIC TISSUE WITH A SMALL FOCUS OF ATYPICAL GLANDS SUSPICIOUS FOR CARCINOMA. J: \"PROSTATE, RIGHT LATERAL BASE, BIOPSY\": PROSTATIC ADENOCARCINOMA, DEBBIE SCORE 4 + 5 = 9, INVOLVING 45% OF BIOPSY. SUSPICIOUS FOR PERINEURAL INVASION. K: \"PROSTATE, RIGHT LATERAL MID, BIOPSY\": PROSTATIC ADENOCARCINOMA, DEBBIE SCORE 4 + 5 = 9, INVOLVING 45% OF BIOPSY. L: \"PROSTATE, RIGHT LATERAL APEX, BIOPSY\": PROSTATIC ADENOCARCINOMA, DEBBIE SCORE 4 + 3 = 7, INVOLVING 35% OF BIOPSY.        LABORATORY:   Lab Results   Component Value Date/Time    Sodium 144 01/17/2017 10:37 AM    Potassium 4.2 01/17/2017 10:37 AM    Chloride 106 01/17/2017 10:37 AM    CO2 30 01/17/2017 10:37 AM    Anion gap 8 01/17/2017 10:37 AM    Glucose 106 01/17/2017 10:37 AM    BUN 19 01/17/2017 10:37 AM    Creatinine 1.06 01/17/2017 10:37 AM    GFR est AA >60 01/17/2017 10:37 AM    GFR est non-AA >60 01/17/2017 10:37 AM    Calcium 9.4 01/17/2017 10:37 AM    Albumin 3.8 01/17/2017 10:37 AM    Protein, total 7.0 01/17/2017 10:37 AM    Globulin 3.2 01/17/2017 10:37 AM    A-G Ratio 1.2 01/17/2017 10:37 AM    AST (SGOT) 34 01/17/2017 10:37 AM    ALT (SGPT) 34 01/17/2017 10:37 AM     Lab Results   Component Value Date/Time    WBC 6.6 01/17/2017 10:37 AM    HGB 15.0 01/17/2017 10:37 AM    HCT 45.7 01/17/2017 10:37 AM    PLATELET 703 57/78/8910 10:37 AM       RADIOLOGY:    Nm Bone Scan Wh Body    Result Date: 12/27/2016  Whole-body bone scan INDICATION:  Restaging prostate cancer Whole-body images were obtained after intravenous injection of 26.4 mCi of technetium HDP. COMPARISON:  09/29/2016 FINDINGS:  There is stable focal uptake in the intertrochanteric left hip. Uptake in the midsternum is also stable. There are no developing lesions in the ribs or spine. IMPRESSION:  1.  Stable uptake in the left hip, concerning for metastatic disease. CT appearance is nonspecific, this could also represent a benign chondroid lesion. 2.  Stable uptake in the midsternum. This is also indeterminate, benign uptake can be seen in this region. Mri Hip Simon Elias Wo Cont    Result Date: 1/12/2017  History: Severe left hip pain. History of prostate cancer diagnosed one year ago. EXAM: MRI left hip with and without contrast TECHNIQUE: Multiplanar multisequence imaging is performed both before and after the uneventful ministration of 7 cc MultiHance. COMPARISON: Bone scan dated 12/27/2016 FINDINGS: The abnormal focus of uptake on the recent bone scan corresponds to a lobular area of increased T2 signal within the posterior aspect of the intertrochanteric portion of the left femur.  This lesion most likely represents an enchondroma. No additional focal marrow lesions demonstrated. Degenerative disc signal noted in the low lumbar spine and pubic symphysis. There is normal signal at the common hamstring tendon origin and at the gluteal muscular insertion. There is a tear of the anterior superior acetabular labrum. No abnormal fluid collection or soft tissue mass. IMPRESSION: 1. The abnormality seen on the bone scan corresponds to an enchondroma within the intertrochanteric portion of the left femur posteriorly. 2. Tear of the anterior superior acetabular labrum on the left. 3. Degenerative changes of the low lumbar spine and pubic symphysis. 4. No osseous metastases seen within the left hip or pelvis. Ct Pelv W Cont    Addendum Date: 12/27/2016    Addendum: Addendum: The sclerotic, intertrochanteric left hip lesion is stable. CT appearance is nonspecific. While this could represent a bony metastatic lesion, could also represent in benign chondroid lesion. Result Date: 12/27/2016  CT of the pelvis INDICATION:  Prostate cancer Multiple axial images were obtained through the pelvis after intravenous injection of 100mL of Isovue 370. Radiation dose reduction techniques were used for this study: All CT scans performed at this facility use one or all of the following: Automated exposure control, adjustment of the mA and/or kVp according to patient's size, iterative reconstruction. Compared with 09/29/2016. FINDINGS: The right pelvic sidewall lymph node described on the prior exam has completely resorbed. No residual mass or adenopathy is seen in the pelvis. The prostate is stable in size and appearance. No discrete mass is seen. There are no inflammatory changes or fluid collections in the pelvis. There are no significant bony lesions.      IMPRESSION: Interval resolution of right pelvic adenopathy     MRI Prostate without and with Contrast 2/1/2017     Indication: Prostate cancer staging     Comparison: Bone scan 12/27/2016. CT pelvis 12/27/2016.     Technique: Multiplanar T2, diffusion, pre and post contrast T1 and multi-phase  dynamic post contrast fat-suppressed T1 sequences. 10 mL Multihance iv  contrast given. Patient's creatinine 1.0.     Findings:  Prostate size: The prostate gland does not measure significantly enlarged: 4.0  cm craniocaudal, 3.6 cm transverse and 3.1 cm AP.     Peripheral Zone: Both the T2 and ADC imaging there is diffusely decreased signal  intensity bilaterally within the peripheral zone, no discreet focus is prominent  on the ADC images. Note is made of some artifact from central zone  calcifications. On the postcontrast imaging early arterial hyperenhancement is  slightly prominent on the left at the mid gland and apex posterior laterally.     Central Zone: No discreet abnormality.     Prostate Capsule: No evidence for capsular invasion.      Neurovascular Bundles: Intact     Seminal Vesicles: Symmetric in size, both somewhat atrophic, they show normal  signal with no abnormal enhancement.     Lymph Nodes: No pathologically enlarged pelvic lymph node is seen.     Bones: No new suspicious skeletal lesion, there is an incidental hemangioma  within L3 vertebrae.     Extra- Prostate Findings: The bladder and rectum are unremarkable. No acute  musculoskeletal finding or vascular lesion.     IMPRESSION  IMPRESSION: The gland is not significantly enlarged. There is diffuse signal  abnormality suggestive of tumor bilaterally throughout the peripheral zone,  mildly more aggressive appearing due to the presence of early hyperenhancement  posterior laterally at the left mid gland and apex. No evidence of extracapsular extension or on this exam metastatic disease. IMPRESSION:  Jimy James is a 73 y.o. male with initially felt to be metastatic prostate cancer but at this point with no changes in his bone findings, more consistent with high risk prostate cancer.   The natural history of prostate cancer was again reviewed with the patient. Prognostic features including stage, [employer] score, age, race, and PSA were reviewed. Treatment options for prostate cancer including active surveillance, hormonal therapy, radiotherapy, and surgery were compared and contrasted with regard to outcome and quality of life. I discussed the radiotherapy options including low-dose rate brachytherapy, high-dose-rate brachytherapy, and external beam radiation therapy. We also discussed the addition of hormone therapy. Side effects and complications of radiation therapy including fatigue, urinary obstructive symptoms, rectal irritation and bleeding, and decline of sexual function were among the complications highlighted. He asked many questions which were answered to his satisfaction. As outlined previously, I feel fractionated IMRT should be delivered. We also discussed the new and emerging data on SpaceOAR or Augmenix which is a water based gel placed between the rectum and prostate which has been shown in clinical trials to spare GI toxicity. This is placed at time of fiducial markers. I wished to complete staging MRI to see if he is a candidate for spaceOAR then have this placed if amenable which does appear to be the case after the MRI. Following this procedure, we would proceed with fractionated radiation over 9 weeks. He was agreeable with that plan. He will be scheduled for SpaceOAR 3/1/17 and then simulation shortly after. PLAN:    1) Discussed viable treatment options focusing on external beam radiation highlighting the risks, benefits, and side effects from treatment. 2) Reviewed available research treatment and cancer care protocols for which patient may be eligible. Unfortunately there are no matching clinical trials available at this time. 3) Further evaluation and preparation to include spaceOAR. 4) Patient will be scheduled for follow up after Augmenix and fiducials for simulation.       Portions of this note were copied from prior encounters and reviewed for accuracy, currency, and represent documentation and tasks completed during this encounter. I verify and attest these portions to be unchanged from prior visits.     Kevin Moore MD  02/10/17

## 2017-02-28 ENCOUNTER — ANESTHESIA EVENT (OUTPATIENT)
Dept: SURGERY | Age: 72
End: 2017-02-28
Payer: MEDICARE

## 2017-02-28 NOTE — H&P
Patient: Lien Worrell MRN: [id number]  SSN: [id number]    YOB: 1945  Age: 73 y.o. Sex: male      Other Providers:  Taqueria Martinez MD, Micheal Cotto MD, Pancho Prince MD    DIAGNOSIS: High risk prostate cancer, GS 4+5=9. 5/12 cores. PSA 11.9    PREVIOUS TREATMENT:  1) Watchful waiting after biopsy 2015  2) Repeat biopsy   3) 10/13/16:  Lupron 22.5 mg    HISTORY OF PRESENT ILLNESS:  Lien Worrell is a 73 y.o. male who I am seeing at the request of Dr. Elma Del Angel for placement of fiducial markers and SpaceOAR hydrogel. He is a patient of Dr. Ivett Giles, previously followed by Dr. Urmila Marie at [address] [place] [address]. He originally had a diagnosis of prostate cancer in 2015 when a biopsy showed [place] 6 disease in 1 core, 45%, with a PSA of 4.7, volume 21 cc. He elected for watchful waiting at that time. PSA at initial biopsy 4.7  Feb 2016 6.4  May 2016 5.9  Sept 2016 11.9    In 2016, he transferred care to Dr. Ivett Giles and PSA devin to 7.34, then to 11.9, prompting repeat biopsy of the prostate that then showed [place] 4+5 disease. Biopsy showed GS 4+5=9 in 3 cores between 35-45%, 1 core GS 4+4=8 in 35%, and 1 core 4+3=7 in 35% for a total of 5/12 cores. He was also having some right hip pain and pelvic discomfort which prompted systemic restaging. Bone scan revealed some small suspicious uptake in the sternum and in the left proximal femur, with corresponding findings on CT, along with an asymmetrically enlarged right pelvic sidewall internal iliac lymph node measuring 17 mm. These findings were felt to most likely represent metastatic disease. Dr. Ivett Giles started him on Lupron with a 22.5 mg dose given on 10/3/16. He was referred to medical oncology for evaluation and met with Dr. Riley Fam. He recommended short term observation with repeat CT and bone scan after 3 months to determine whether the lesions responded to ADT, which would suggest stage IV disease.  CT showed resolution of pelvic adenopathy but bone lesions were stable. PSA was 0.6 consistent with excellent biochemical response but he continued to have pain in the left hip still. Dr. Jori Red was entertaining the possibilty that the bone lesions were not metastases. He chose to obtain MRI of the left hip, and if consistent with a nonneoplastic etiology which was seen 1/12/17, he recommend rad onc referral for consideration of local therapy. At that time, he was having hot flashes and failed Effexor with changes in his blood pressure. He was able to play golf regularly and would prefer to avoid treatment if possible. He has decided to move forward with radiation therapy. He has also decided to include placement of SpaceOAR hydrogel for protection of the rectal wall along with placement of fiducial markers for image guidance. He will undergo this procedure today. GENITOURINARY ROS:  AUA 6 pretreatment. No major issues. He is able to get an erection. PAST MEDICAL HISTORY:    Past Medical History:   Diagnosis Date    Anxiety     Arthritis     Elevated prostate specific antigen (PSA) 1/15/2014    Former cigar smoker     GERD (gastroesophageal reflux disease)     Hypertension     Hypertrophy of prostate with urinary obstruction and other lower urinary tract symptoms (LUTS) 1/15/2014    Prostate cancer [facility])        The patient denies history of collagen vascular diseases, pacemaker insertion, prior radiation or prior chemotherapy.      PAST SURGICAL HISTORY:   Past Surgical History:   Procedure Laterality Date    HX COLONOSCOPY      HX MALIGNANT SKIN LESION EXCISION      squamous cell - 2-3 times    HX TONSILLECTOMY  as child    TX PROSTATE BIOPSY, NEEDLE, SATURATION SAMPLING         MEDICATIONS:     Current Facility-Administered Medications:     lidocaine (XYLOCAINE) 10 mg/mL (1 %) injection 0.1 mL, 0.1 mL, SubCUTAneous, PRN, Manuel Gallagher MD    lactated ringers infusion, 100 mL/hr, IntraVENous, CONTINUOUS, Sera Waters MD, Last Rate: 100 mL/hr at 03/01/17 0711, 100 mL/hr at 03/01/17 2906    lactated ringers bolus infusion 1,000 mL, 1,000 mL, IntraVENous, ONCE PRN, Andi Daniel MD    0.9% sodium chloride infusion, 50 mL/hr, IntraVENous, CONTINUOUS, Sera Plaza MD    sodium chloride (NS) flush 5-10 mL, 5-10 mL, IntraVENous, Q8H, Sera Plaza MD    sodium chloride (NS) flush 5-10 mL, 5-10 mL, IntraVENous, PRN, Andi Daniel MD    fentaNYL citrate (PF) injection 25 mcg, 25 mcg, IntraVENous, ONCE, Sera Plaza MD    midazolam (VERSED) injection 2 mg, 2 mg, IntraVENous, ONCE PRN, Andi Daniel MD    midazolam (VERSED) injection 2 mg, 2 mg, IntraVENous, ONCE, Sera Plaza MD    ALLERGIES:   No Known Allergies    SOCIAL HISTORY:   Social History     Social History    Marital status:      Spouse name: N/A    Number of children: N/A    Years of education: N/A     Occupational History    Not on file. Social History Main Topics    Smoking status: Former Smoker     Types: Cigars    Smokeless tobacco: Never Used    Alcohol use 3.6 - 4.8 oz/week     0 Standard drinks or equivalent, 6 - 8 Cans of beer per week    Drug use: No    Sexual activity: Not on file     Other Topics Concern    Not on file     Social History Narrative       FAMILY HISTORY:   Family History   Problem Relation Age of Onset    Heart Disease Father      pacemaker in his late [de-identified]       REVIEW OF SYSTEMS: Please see the completed review of systems sheet in the chart that I have reviewed today. PHYSICAL EXAMINATION:   ECOG Performance status 1  VITAL SIGNS:   Visit Vitals    BP (!) 188/107 (BP 1 Location: Left arm, BP Patient Position: Supine)    Pulse 66    Temp 97.7 °F (36.5 °C)    Resp 18    Wt 68.9 kg (152 lb)    SpO2 96%    BMI 22.45 kg/m2        GENERAL: The patient is well-developed, ambulatory, alert and in no acute distress. HEENT: Head is normocephalic, atraumatic.  Pupils are equal, round and reactive to light and accommodation. Extraocular movement intact. Hearing is intact bilaterally to finger rub. Oral cavity reveals no lesions. Mucous membranes are moist. NECK: Neck is supple with no masses. CARDIOVASCULAR: Heart is regular rate and rhythm. There are no murmurs rubs or gallups. Radial pulses are 2+ RESPIRATORY: Lungs are clear to auscultation and percussion. There is normal respiratory effort. PATHOLOGY:    9/19/16:      DIAGNOSIS   A: \"PROSTATE, LEFT LATERAL BASE, BIOPSY\": PROSTATIC TISSUE WITH CHRONIC INFLAMMATION. B: \"PROSTATE, LEFT LATERAL MID, BIOPSY\": PROSTATIC TISSUE WITH CHRONIC INFLAMMATION. C: \"PROSTATE, LEFT LATERAL APEX, BIOPSY\": PROSTATIC TISSUE WITH FOCAL ACUTE AND CHRONIC INFLAMMATION. D: \"PROSTATE, LEFT BASE, BIOPSY\": PROSTATIC TISSUE WITH CHRONIC INFLAMMATION. E: \"PROSTATE, LEFT MID, BIOPSY\": PROSTATITIC TISSUE WITH FOCAL ACUTE AND CHRONIC INFLAMMATION. F: \"PROSTATE, LEFT APEX, BIOPSY\": PROSTATIC TISSUE, NO CARCINOMA IDENTIFIED. G: \"PROSTATE, RIGHT BASE, BIOPSY\": PROSTATIC ADENOCARCINOMA, DEBBIE SCORE   4 + 5 = 9, INVOLVING 40% OF BIOPSY. PERINEURAL INVASION PRESENT. H: \"PROSTATE, RIGHT MID, BIOPSY\": PROSTATIC ADENOCARCINOMA, DEBBIE SCORE   4 + 4 = 8, INVOLVING 35% OF BIOPSY. I: \"PROSTATE, RIGHT APEX, BIOPSY\": PROSTATIC TISSUE WITH A SMALL FOCUS OF ATYPICAL GLANDS SUSPICIOUS FOR CARCINOMA. J: \"PROSTATE, RIGHT LATERAL BASE, BIOPSY\": PROSTATIC ADENOCARCINOMA, DEBBIE SCORE 4 + 5 = 9, INVOLVING 45% OF BIOPSY. SUSPICIOUS FOR PERINEURAL INVASION. K: \"PROSTATE, RIGHT LATERAL MID, BIOPSY\": PROSTATIC ADENOCARCINOMA, DEBBIE SCORE 4 + 5 = 9, INVOLVING 45% OF BIOPSY. L: \"PROSTATE, RIGHT LATERAL APEX, BIOPSY\": PROSTATIC ADENOCARCINOMA, DEBBIE SCORE 4 + 3 = 7, INVOLVING 35% OF BIOPSY.        LABORATORY:   Lab Results   Component Value Date/Time    Sodium 144 01/17/2017 10:37 AM    Potassium 4.2 01/17/2017 10:37 AM    Chloride 106 01/17/2017 10:37 AM    CO2 30 01/17/2017 10:37 AM    Anion gap 8 01/17/2017 10:37 AM    Glucose 106 01/17/2017 10:37 AM    BUN 19 01/17/2017 10:37 AM    Creatinine 1.06 01/17/2017 10:37 AM    GFR est AA >60 01/17/2017 10:37 AM    GFR est non-AA >60 01/17/2017 10:37 AM    Calcium 9.4 01/17/2017 10:37 AM    Albumin 3.8 01/17/2017 10:37 AM    Protein, total 7.0 01/17/2017 10:37 AM    Globulin 3.2 01/17/2017 10:37 AM    A-G Ratio 1.2 01/17/2017 10:37 AM    AST (SGOT) 34 01/17/2017 10:37 AM    ALT (SGPT) 34 01/17/2017 10:37 AM     Lab Results   Component Value Date/Time    WBC 6.6 01/17/2017 10:37 AM    HGB 15.0 01/17/2017 10:37 AM    HCT 45.7 01/17/2017 10:37 AM    PLATELET 778 61/42/3727 10:37 AM       RADIOLOGY:    Nm Bone Scan Wh Body    Result Date: 12/27/2016  Whole-body bone scan INDICATION:  Restaging prostate cancer Whole-body images were obtained after intravenous injection of 26.4 mCi of technetium HDP. COMPARISON:  09/29/2016 FINDINGS:  There is stable focal uptake in the intertrochanteric left hip. Uptake in the midsternum is also stable. There are no developing lesions in the ribs or spine. IMPRESSION:  1.  Stable uptake in the left hip, concerning for metastatic disease. CT appearance is nonspecific, this could also represent a benign chondroid lesion. 2.  Stable uptake in the midsternum. This is also indeterminate, benign uptake can be seen in this region. Mri Hip Darylene Lynch Wo Cont    Result Date: 1/12/2017  History: Severe left hip pain. History of prostate cancer diagnosed one year ago. EXAM: MRI left hip with and without contrast TECHNIQUE: Multiplanar multisequence imaging is performed both before and after the uneventful ministration of 7 cc MultiHance. COMPARISON: Bone scan dated 12/27/2016 FINDINGS: The abnormal focus of uptake on the recent bone scan corresponds to a lobular area of increased T2 signal within the posterior aspect of the intertrochanteric portion of the left femur.  This lesion most likely represents an enchondroma. No additional focal marrow lesions demonstrated. Degenerative disc signal noted in the low lumbar spine and pubic symphysis. There is normal signal at the common hamstring tendon origin and at the gluteal muscular insertion. There is a tear of the anterior superior acetabular labrum. No abnormal fluid collection or soft tissue mass. IMPRESSION: 1. The abnormality seen on the bone scan corresponds to an enchondroma within the intertrochanteric portion of the left femur posteriorly. 2. Tear of the anterior superior acetabular labrum on the left. 3. Degenerative changes of the low lumbar spine and pubic symphysis. 4. No osseous metastases seen within the left hip or pelvis. Ct Pelv W Cont    Addendum Date: 12/27/2016    Addendum: Addendum: The sclerotic, intertrochanteric left hip lesion is stable. CT appearance is nonspecific. While this could represent a bony metastatic lesion, could also represent in benign chondroid lesion. Result Date: 12/27/2016  CT of the pelvis INDICATION:  Prostate cancer Multiple axial images were obtained through the pelvis after intravenous injection of 100mL of Isovue 370. Radiation dose reduction techniques were used for this study: All CT scans performed at this facility use one or all of the following: Automated exposure control, adjustment of the mA and/or kVp according to patient's size, iterative reconstruction. Compared with 09/29/2016. FINDINGS: The right pelvic sidewall lymph node described on the prior exam has completely resorbed. No residual mass or adenopathy is seen in the pelvis. The prostate is stable in size and appearance. No discrete mass is seen. There are no inflammatory changes or fluid collections in the pelvis. There are no significant bony lesions.      IMPRESSION: Interval resolution of right pelvic adenopathy     MRI Prostate without and with Contrast 2/1/2017     Indication: Prostate cancer staging     Comparison: Bone scan 12/27/2016. CT pelvis 12/27/2016.     Technique: Multiplanar T2, diffusion, pre and post contrast T1 and multi-phase  dynamic post contrast fat-suppressed T1 sequences. 10 mL Multihance iv  contrast given. Patient's creatinine 1.0.     Findings:  Prostate size: The prostate gland does not measure significantly enlarged: 4.0  cm craniocaudal, 3.6 cm transverse and 3.1 cm AP.     Peripheral Zone: Both the T2 and ADC imaging there is diffusely decreased signal  intensity bilaterally within the peripheral zone, no discreet focus is prominent  on the ADC images. Note is made of some artifact from central zone  calcifications. On the postcontrast imaging early arterial hyperenhancement is  slightly prominent on the left at the mid gland and apex posterior laterally.     Central Zone: No discreet abnormality.     Prostate Capsule: No evidence for capsular invasion.      Neurovascular Bundles: Intact     Seminal Vesicles: Symmetric in size, both somewhat atrophic, they show normal  signal with no abnormal enhancement.     Lymph Nodes: No pathologically enlarged pelvic lymph node is seen.     Bones: No new suspicious skeletal lesion, there is an incidental hemangioma  within L3 vertebrae.     Extra- Prostate Findings: The bladder and rectum are unremarkable. No acute  musculoskeletal finding or vascular lesion.     IMPRESSION  IMPRESSION: The gland is not significantly enlarged. There is diffuse signal  abnormality suggestive of tumor bilaterally throughout the peripheral zone,  mildly more aggressive appearing due to the presence of early hyperenhancement  posterior laterally at the left mid gland and apex. No evidence of extracapsular extension or on this exam metastatic disease. IMPRESSION:  Valentino Crawford is a 73 y.o. male with initially felt to be metastatic prostate cancer but at this point with no changes in his bone findings, more consistent with high risk prostate cancer.   The natural history of prostate cancer was again reviewed with the patient. Prognostic features including stage, Clover score, age, race, and PSA were reviewed. Treatment options for prostate cancer including active surveillance, hormonal therapy, radiotherapy, and surgery were compared and contrasted with regard to outcome and quality of life. I discussed the radiotherapy options including low-dose rate brachytherapy, high-dose-rate brachytherapy, and external beam radiation therapy. We also discussed the addition of hormone therapy. Side effects and complications of radiation therapy including fatigue, urinary obstructive symptoms, rectal irritation and bleeding, and decline of sexual function were among the complications highlighted. He asked many questions which were answered to his satisfaction.        PLAN: Mr. Salty Lindsey has decided to move forward with radiation therapy for prostate cancer. He has also decided to include placement of SpaceOAR, an injectable hydrogel that is placed between the rectum and prostate in order to help reduce radiation dose to the rectum. He has previously been given instructions on bowel prep prior to the procedure, as well as prescriptions for antibiotics and Norco. We have discussed in detail the procedure itself and the potential risks of the procedure. He will undergo CT simulation in 1 week with the intent of beginning radiation therapy approximately 2 weeks after. Portions of this note were copied from prior encounters and reviewed for accuracy, currency, and represent documentation and tasks completed during this encounter. I verify and attest these portions to be unchanged from prior visits.     Becki Evans MD  03/01/17

## 2017-02-28 NOTE — OP NOTES
Procedure Note  03/01/17      PROCEDURE: Transperineal placement of prostate fiducial markers and SpaceOAR placement under ultrasound and fluoroscopic guidance    PREOPERATIVE DIAGNOSIS: Prostate cancer    POSTOPERATIVE DIAGNOSIS: Prostate cancer    SURGEON: Vale Rose M.D. ANESTHESIA: Propofol    PROCEDURE IN DETAIL: Theresa Pierce is a 80-year-old gentleman with prostate cancer. He has decided to move forward with radiation therapy. In order to facilitate treatment he has undergone transperineal placement of fiducial markers into the prostate, as well as SpaceOAR hydrogel placement. He was properly identified in the pre-op area. He was brought to the operating room. A time out was performed. He was placed in dorsal lithotomy position. Propofol was administered. He was prepped in the usual sterile fashion. An ultrasound probe was attached to the stepper device and the probe was placed into the rectum. Images of the prostate were obtained from base to apex. The C-arm was positioned over the patient and images were obtained. 3 needles containing one fiducial marker each were then directed to the proper locations in the prostate. Once the position of each needle was verified by both ultrasound and x-ray, the markers were deployed and the needles removed. Placement of SpaceOAR hydrogel was then initiated. The hydrogel delivery device was assembled. A 22-gauge needle was then advanced into the space between the posterior prostate and the anterior rectum using ultrasound guidance. Hydrodissection was performed by injecting 5 cc of saline into the space. The hydrogel delivery device was then attached to the 22-gauge needle. 10 cc of hydrogel was injected into the space. The needle was then retracted. Ultrasound was used to verify the position of the hydrogel. The ultrasound probe was then removed from the rectum. The patient was returned to the PACU in stable condition without any acute issues. DISPOSITION: He will take antibiotics for 3 days. He will return to Radiation Oncology for CT simulation in 1 week with radiation therapy expected to begin 1-2 weeks after.

## 2017-03-01 ENCOUNTER — SURGERY (OUTPATIENT)
Age: 72
End: 2017-03-01

## 2017-03-01 ENCOUNTER — ANESTHESIA (OUTPATIENT)
Dept: SURGERY | Age: 72
End: 2017-03-01
Payer: MEDICARE

## 2017-03-01 ENCOUNTER — HOSPITAL ENCOUNTER (OUTPATIENT)
Age: 72
Setting detail: OUTPATIENT SURGERY
Discharge: HOME | End: 2017-03-01
Attending: RADIOLOGY | Admitting: RADIOLOGY
Payer: MEDICARE

## 2017-03-01 ENCOUNTER — APPOINTMENT (OUTPATIENT)
Dept: GENERAL RADIOLOGY | Age: 72
End: 2017-03-01
Attending: RADIOLOGY
Payer: MEDICARE

## 2017-03-01 VITALS
OXYGEN SATURATION: 98 % | WEIGHT: 152 LBS | TEMPERATURE: 98.1 F | SYSTOLIC BLOOD PRESSURE: 112 MMHG | BODY MASS INDEX: 22.45 KG/M2 | RESPIRATION RATE: 16 BRPM | HEART RATE: 56 BPM | DIASTOLIC BLOOD PRESSURE: 67 MMHG

## 2017-03-01 PROCEDURE — 74011250636 HC RX REV CODE- 250/636

## 2017-03-01 PROCEDURE — 76060000032 HC ANESTHESIA 0.5 TO 1 HR: Performed by: RADIOLOGY

## 2017-03-01 PROCEDURE — 74011250636 HC RX REV CODE- 250/636: Performed by: ANESTHESIOLOGY

## 2017-03-01 PROCEDURE — 76010000093 HC SPECIAL PROCEDURE: Performed by: RADIOLOGY

## 2017-03-01 PROCEDURE — A4649 SURGICAL SUPPLIES: HCPCS

## 2017-03-01 PROCEDURE — 0438T HC TPRNL PLMT BIODEGRDABL MATRL: CPT

## 2017-03-01 PROCEDURE — 74011250637 HC RX REV CODE- 250/637: Performed by: ANESTHESIOLOGY

## 2017-03-01 PROCEDURE — 77030015736 HC BLLN ENDOCAV CIVC -B: Performed by: RADIOLOGY

## 2017-03-01 PROCEDURE — 76210000021 HC REC RM PH II 0.5 TO 1 HR: Performed by: RADIOLOGY

## 2017-03-01 RX ORDER — SODIUM CHLORIDE, SODIUM LACTATE, POTASSIUM CHLORIDE, CALCIUM CHLORIDE 600; 310; 30; 20 MG/100ML; MG/100ML; MG/100ML; MG/100ML
100 INJECTION, SOLUTION INTRAVENOUS CONTINUOUS
Status: DISCONTINUED | OUTPATIENT
Start: 2017-03-01 | End: 2017-03-01 | Stop reason: HOSPADM

## 2017-03-01 RX ORDER — MIDAZOLAM HYDROCHLORIDE 1 MG/ML
2 INJECTION, SOLUTION INTRAMUSCULAR; INTRAVENOUS ONCE
Status: COMPLETED | OUTPATIENT
Start: 2017-03-01 | End: 2017-03-01

## 2017-03-01 RX ORDER — FAMOTIDINE 20 MG/1
20 TABLET, FILM COATED ORAL ONCE
Status: COMPLETED | OUTPATIENT
Start: 2017-03-01 | End: 2017-03-01

## 2017-03-01 RX ORDER — PROPOFOL 10 MG/ML
INJECTION, EMULSION INTRAVENOUS AS NEEDED
Status: DISCONTINUED | OUTPATIENT
Start: 2017-03-01 | End: 2017-03-01 | Stop reason: HOSPADM

## 2017-03-01 RX ORDER — HYDROMORPHONE HYDROCHLORIDE 2 MG/ML
0.5 INJECTION, SOLUTION INTRAMUSCULAR; INTRAVENOUS; SUBCUTANEOUS
Status: DISCONTINUED | OUTPATIENT
Start: 2017-03-01 | End: 2017-03-01 | Stop reason: HOSPADM

## 2017-03-01 RX ORDER — SODIUM CHLORIDE 0.9 % (FLUSH) 0.9 %
5-10 SYRINGE (ML) INJECTION EVERY 8 HOURS
Status: DISCONTINUED | OUTPATIENT
Start: 2017-03-01 | End: 2017-03-01 | Stop reason: HOSPADM

## 2017-03-01 RX ORDER — SODIUM CHLORIDE 0.9 % (FLUSH) 0.9 %
5-10 SYRINGE (ML) INJECTION AS NEEDED
Status: DISCONTINUED | OUTPATIENT
Start: 2017-03-01 | End: 2017-03-01 | Stop reason: HOSPADM

## 2017-03-01 RX ORDER — DIPHENHYDRAMINE HYDROCHLORIDE 50 MG/ML
12.5 INJECTION, SOLUTION INTRAMUSCULAR; INTRAVENOUS ONCE
Status: DISCONTINUED | OUTPATIENT
Start: 2017-03-01 | End: 2017-03-01 | Stop reason: HOSPADM

## 2017-03-01 RX ORDER — OXYCODONE AND ACETAMINOPHEN 5; 325 MG/1; MG/1
1 TABLET ORAL AS NEEDED
Status: DISCONTINUED | OUTPATIENT
Start: 2017-03-01 | End: 2017-03-01 | Stop reason: HOSPADM

## 2017-03-01 RX ORDER — SODIUM CHLORIDE 9 MG/ML
50 INJECTION, SOLUTION INTRAVENOUS CONTINUOUS
Status: DISCONTINUED | OUTPATIENT
Start: 2017-03-01 | End: 2017-03-01 | Stop reason: HOSPADM

## 2017-03-01 RX ORDER — OXYCODONE HYDROCHLORIDE 5 MG/1
5 TABLET ORAL
Status: DISCONTINUED | OUTPATIENT
Start: 2017-03-01 | End: 2017-03-01 | Stop reason: HOSPADM

## 2017-03-01 RX ORDER — FENTANYL CITRATE 50 UG/ML
INJECTION, SOLUTION INTRAMUSCULAR; INTRAVENOUS AS NEEDED
Status: DISCONTINUED | OUTPATIENT
Start: 2017-03-01 | End: 2017-03-01 | Stop reason: HOSPADM

## 2017-03-01 RX ORDER — FENTANYL CITRATE 50 UG/ML
25 INJECTION, SOLUTION INTRAMUSCULAR; INTRAVENOUS ONCE
Status: DISCONTINUED | OUTPATIENT
Start: 2017-03-01 | End: 2017-03-01 | Stop reason: HOSPADM

## 2017-03-01 RX ORDER — MIDAZOLAM HYDROCHLORIDE 1 MG/ML
2 INJECTION, SOLUTION INTRAMUSCULAR; INTRAVENOUS
Status: DISCONTINUED | OUTPATIENT
Start: 2017-03-01 | End: 2017-03-01 | Stop reason: HOSPADM

## 2017-03-01 RX ORDER — LIDOCAINE HYDROCHLORIDE 10 MG/ML
0.1 INJECTION INFILTRATION; PERINEURAL AS NEEDED
Status: DISCONTINUED | OUTPATIENT
Start: 2017-03-01 | End: 2017-03-01 | Stop reason: HOSPADM

## 2017-03-01 RX ADMIN — PROPOFOL 40 MG: 10 INJECTION, EMULSION INTRAVENOUS at 08:53

## 2017-03-01 RX ADMIN — PROPOFOL 30 MG: 10 INJECTION, EMULSION INTRAVENOUS at 08:49

## 2017-03-01 RX ADMIN — PROPOFOL 30 MG: 10 INJECTION, EMULSION INTRAVENOUS at 08:51

## 2017-03-01 RX ADMIN — PROPOFOL 50 MG: 10 INJECTION, EMULSION INTRAVENOUS at 08:42

## 2017-03-01 RX ADMIN — SODIUM CHLORIDE, SODIUM LACTATE, POTASSIUM CHLORIDE, AND CALCIUM CHLORIDE 100 ML/HR: 600; 310; 30; 20 INJECTION, SOLUTION INTRAVENOUS at 07:11

## 2017-03-01 RX ADMIN — MIDAZOLAM HYDROCHLORIDE 2 MG: 1 INJECTION, SOLUTION INTRAMUSCULAR; INTRAVENOUS at 08:30

## 2017-03-01 RX ADMIN — PROPOFOL 50 MG: 10 INJECTION, EMULSION INTRAVENOUS at 08:44

## 2017-03-01 RX ADMIN — FENTANYL CITRATE 100 MCG: 50 INJECTION, SOLUTION INTRAMUSCULAR; INTRAVENOUS at 08:41

## 2017-03-01 RX ADMIN — FAMOTIDINE 20 MG: 20 TABLET ORAL at 07:11

## 2017-03-01 NOTE — DISCHARGE INSTRUCTIONS
ACTIVITY  · As tolerated and as directed by your doctor. · Bathe or shower as directed by your doctor. DIET  · Clear liquids until no nausea or vomiting; then light diet for the first day. · Advance to regular diet on second day, unless your doctor orders otherwise. · If nausea and vomiting continues, call your doctor. PAIN  · Take pain medication as directed by your doctor. · Call your doctor if pain is NOT relieved by medication. · DO NOT take aspirin of blood thinners unless directed by your doctor. DRESSING CARE       CALL YOUR DOCTOR IF   · Excessive bleeding that does not stop after holding pressure over the area  · Temperature of 101 degrees F or above  · Excessive redness, swelling or bruising, and/ or green or yellow, smelly discharge from incision    AFTER ANESTHESIA   · For the first 24 hours: DO NOT Drive, Drink alcoholic beverages, or Make important decisions. · Be aware of dizziness following anesthesia and while taking pain medication. APPOINTMENT DATE/ TIME    YOUR DOCTOR'S PHONE NUMBER       DISCHARGE SUMMARY from Nurse    PATIENT INSTRUCTIONS:    After general anesthesia or intravenous sedation, for 24 hours or while taking prescription Narcotics:  · Limit your activities  · Do not drive and operate hazardous machinery  · Do not make important personal or business decisions  · Do  not drink alcoholic beverages  · If you have not urinated within 8 hours after discharge, please contact your surgeon on call. *  Please give a list of your current medications to your Primary Care Provider. *  Please update this list whenever your medications are discontinued, doses are      changed, or new medications (including over-the-counter products) are added. *  Please carry medication information at all times in case of emergency situations.       These are general instructions for a healthy lifestyle:    No smoking/ No tobacco products/ Avoid exposure to second hand smoke    Surgeon General's Warning:  Quitting smoking now greatly reduces serious risk to your health. Obesity, smoking, and sedentary lifestyle greatly increases your risk for illness    A healthy diet, regular physical exercise & weight monitoring are important for maintaining a healthy lifestyle    You may be retaining fluid if you have a history of heart failure or if you experience any of the following symptoms:  Weight gain of 3 pounds or more overnight or 5 pounds in a week, increased swelling in our hands or feet or shortness of breath while lying flat in bed. Please call your doctor as soon as you notice any of these symptoms; do not wait until your next office visit. Recognize signs and symptoms of STROKE:    F-face looks uneven    A-arms unable to move or move unevenly    S-speech slurred or non-existent    T-time-call 911 as soon as signs and symptoms begin-DO NOT go       Back to bed or wait to see if you get better-TIME IS BRAIN. Watch for bleeding.

## 2017-03-01 NOTE — IP AVS SNAPSHOT
Current Discharge Medication List  
  
ASK your doctor about these medications Dose & Instructions Dispensing Information Comments Morning Noon Evening Bedtime AMBIEN 5 mg tablet Generic drug:  zolpidem Your next dose is: Today, Tomorrow Other:  ____________ Take  by mouth nightly as needed for Sleep. Refills:  0  
     
   
   
   
  
 aspirin delayed-release 81 mg tablet Your next dose is: Today, Tomorrow Other:  ____________ Take  by mouth every morning. Indications: held for 5 days Refills:  0  
     
   
   
   
  
 carvedilol 6.25 mg tablet Commonly known as:  Juline Balzarine Your next dose is: Today, Tomorrow Other:  ____________ Dose:  6.25 mg Take 6.25 mg by mouth two (2) times daily (with meals). Indications: take on the [facility] Refills:  0 GLUCOSAMINE 1500 COMPLEX PO Your next dose is: Today, Tomorrow Other:  ____________ Dose:  1 Tab Take 1 Tab by mouth every morning. Indications: hold as of today Refills:  0  
     
   
   
   
  
 hydroCHLOROthiazide 25 mg tablet Commonly known as:  HYDRODIURIL Your next dose is: Today, Tomorrow Other:  ____________ Dose:  25 mg Take 25 mg by mouth every morning. Refills:  0 LORazepam 1 mg tablet Commonly known as:  ATIVAN Your next dose is: Today, Tomorrow Other:  ____________ Dose:  1 mg Take 1 Tab by mouth every six (6) hours as needed for Anxiety. Max Daily Amount: 4 mg. Quantity:  90 Tab Refills:  3 MULTIPLE VITAMIN-MINERALS PO Your next dose is: Today, Tomorrow Other:  ____________ Dose:  0.5 Tab Take 0.5 Tabs by mouth every morning. Indications: hold until after surgery Refills:  0  
     
   
   
   
  
 raNITIdine 150 mg tablet Commonly known as:  ZANTAC Your next dose is: Today, Tomorrow Other:  ____________ Dose:  150 mg Take 150 mg by mouth nightly. Refills:  1

## 2017-03-01 NOTE — ANESTHESIA POSTPROCEDURE EVALUATION
Post-Anesthesia Evaluation and Assessment    Patient: Jelani Daniel MRN: [id number]  SSN: [id number]    YOB: 1945  Age: 73 y.o. Sex: male       Cardiovascular Function/Vital Signs  Visit Vitals    /75    Pulse (!) 57    Temp 36.7 °C (98.1 °F)    Resp 16    Wt 68.9 kg (152 lb)    SpO2 96%    BMI 22.45 kg/m2       Patient is status post total IV anesthesia anesthesia for Procedure(s): FIDUCIAL MARKER PLACEMENT WTIH SPACEOAR. Nausea/Vomiting: None    Postoperative hydration reviewed and adequate. Pain:  Pain Scale 1: Numeric (0 - 10) (03/01/17 0908)  Pain Intensity 1: 0 (03/01/17 0908)   Managed    Neurological Status:   Neuro (WDL): Within Defined Limits (03/01/17 0908)   At baseline    Mental Status and Level of Consciousness: Arousable    Pulmonary Status:   O2 Device: Nasal cannula (03/01/17 0908)   Adequate oxygenation and airway patent    Complications related to anesthesia: None    Post-anesthesia assessment completed.  No concerns    Signed By: Luciana Rose MD     March 1, 2017

## 2017-03-01 NOTE — IP AVS SNAPSHOT
Maddie Hendrickson 
 
 
 [address] [facility] 
[phone] Patient: Regla Zapata MRN: [id number] ZNU:3/61/3522 You are allergic to the following No active allergies Recent Documentation Weight 68.9 kg Emergency Contacts Name Discharge Info Relation Home Work Mobile [place] [facility] CC DISCHARGE CAREGIVER [3] Daughter [21] [phone] Manjit Pruitt  Daughter [21] [phone] About your hospitalization You were admitted on:  March 1, 2017 You last received care in the:  [facility] OP PACU You were discharged on:  March 1, 2017 Unit phone number:  [phone] Why you were hospitalized Your primary diagnosis was:  Not on File Providers Seen During Your Hospitalizations Provider Role Specialty Primary office phone Anne Miller MD Attending Provider Radiation Oncology [phone] Your Primary Care Physician (PCP) Primary Care Physician Office Phone Office Fax Jared Chapin [phone] Follow-up Information None Your Appointments Friday March 10, 2017  8:00 AM EST  
MR PELVIS W WO CONTRAST with SFD MRI UNIT 1 4 [facility] ([address] [place] [address]) [address] [address] [address] [facility]  
[phone] NPO - Patient must be NPO (nothing to eat/drink) for 6 hours prior to study except water and meds NOTE If exam is for bony structures then NPO is not required  IMPLANTS - Bring information (ID card) for any medically implanted devices. - Patients with a history of metal in eyes will require orbit x-rays for clearance prior to MRI  PATIENT ARRIVAL - Please arrive 30 minutes early to check in. Friday March 10, 2017  8:45 AM EST  
[facility] OFFICE VISIT with Dawson Roper NP  
[address] [facility] [address] [facility] [address] [facility] [address]  
[phone] Friday March 10, 2017  9:00 AM EST  
CT SIM with [address] ([address]) [address] [facility] [address] [facility] [id number] [phone] Current Discharge Medication List  
  
ASK your doctor about these medications Dose & Instructions Dispensing Information Comments Morning Noon Evening Bedtime AMBIEN 5 mg tablet Generic drug:  zolpidem Your next dose is: Today, Tomorrow Other:  _________ Take  by mouth nightly as needed for Sleep. Refills:  0  
     
   
   
   
  
 aspirin delayed-release 81 mg tablet Your next dose is: Today, Tomorrow Other:  _________ Take  by mouth every morning. Indications: held for 5 days Refills:  0  
     
   
   
   
  
 carvedilol 6.25 mg tablet Commonly known as:  Sindy Locket Your next dose is: Today, Tomorrow Other:  _________ Dose:  6.25 mg Take 6.25 mg by mouth two (2) times daily (with meals). Indications: take on the [facility] Refills:  0 GLUCOSAMINE 1500 COMPLEX PO Your next dose is: Today, Tomorrow Other:  _________ Dose:  1 Tab Take 1 Tab by mouth every morning. Indications: hold as of today Refills:  0  
     
   
   
   
  
 hydroCHLOROthiazide 25 mg tablet Commonly known as:  HYDRODIURIL Your next dose is: Today, Tomorrow Other:  _________ Dose:  25 mg Take 25 mg by mouth every morning. Refills:  0 LORazepam 1 mg tablet Commonly known as:  ATIVAN Your next dose is: Today, Tomorrow Other:  _________ Dose:  1 mg Take 1 Tab by mouth every six (6) hours as needed for Anxiety. Max Daily Amount: 4 mg. Quantity:  90 Tab Refills:  3  MULTIPLE VITAMIN-MINERALS PO  
   
 Your next dose is: Today, Tomorrow Other:  _________ Dose:  0.5 Tab Take 0.5 Tabs by mouth every morning. Indications: hold until after surgery Refills:  0  
     
   
   
   
  
 raNITIdine 150 mg tablet Commonly known as:  ZANTAC Your next dose is: Today, Tomorrow Other:  _________ Dose:  150 mg Take 150 mg by mouth nightly. Refills:  1 Discharge Instructions ACTIVITY · As tolerated and as directed by your doctor. · Bathe or shower as directed by your doctor. DIET · Clear liquids until no nausea or vomiting; then light diet for the first day. · Advance to regular diet on second day, unless your doctor orders otherwise. · If nausea and vomiting continues, call your doctor. PAIN 
· Take pain medication as directed by your doctor. · Call your doctor if pain is NOT relieved by medication. · DO NOT take aspirin of blood thinners unless directed by your doctor. DRESSING CARE  
 
 
CALL YOUR DOCTOR IF  
· Excessive bleeding that does not stop after holding pressure over the area · Temperature of 101 degrees F or above · Excessive redness, swelling or bruising, and/ or green or yellow, smelly discharge from incision AFTER ANESTHESIA · For the first 24 hours: DO NOT Drive, Drink alcoholic beverages, or Make important decisions. · Be aware of dizziness following anesthesia and while taking pain medication. APPOINTMENT DATE/ TIME 
 
YOUR DOCTOR'S PHONE NUMBER  
 
 
DISCHARGE SUMMARY from Nurse PATIENT INSTRUCTIONS: 
 
After general anesthesia or intravenous sedation, for 24 hours or while taking prescription Narcotics: · Limit your activities · Do not drive and operate hazardous machinery · Do not make important personal or business decisions · Do  not drink alcoholic beverages · If you have not urinated within 8 hours after discharge, please contact your surgeon on call. *  Please give a list of your current medications to your Primary Care Provider. *  Please update this list whenever your medications are discontinued, doses are 
    changed, or new medications (including over-the-counter products) are added. *  Please carry medication information at all times in case of emergency situations. These are general instructions for a healthy lifestyle: No smoking/ No tobacco products/ Avoid exposure to second hand smoke Surgeon General's Warning:  Quitting smoking now greatly reduces serious risk to your health. Obesity, smoking, and sedentary lifestyle greatly increases your risk for illness A healthy diet, regular physical exercise & weight monitoring are important for maintaining a healthy lifestyle You may be retaining fluid if you have a history of heart failure or if you experience any of the following symptoms:  Weight gain of 3 pounds or more overnight or 5 pounds in a week, increased swelling in our hands or feet or shortness of breath while lying flat in bed. Please call your doctor as soon as you notice any of these symptoms; do not wait until your next office visit. Recognize signs and symptoms of STROKE: 
 
F-face looks uneven A-arms unable to move or move unevenly S-speech slurred or non-existent T-time-call 911 as soon as signs and symptoms begin-DO NOT go Back to bed or wait to see if you get better-TIME IS BRAIN. Watch for bleeding. Discharge Orders None Introducing [facility]! Marisol Hernadez introduces [employer] patient portal. Now you can access parts of your medical record, email your doctor's office, and request medication refills online. 1. In your internet browser, go to [email or web address] 2. Click on the First Time User? Click Here link in the Sign In box. You will see the New Member Sign Up page. 3. Enter your [employer] Access Code exactly as it appears below.  You will not need to use this code after youve completed the sign-up process. If you do not sign up before the expiration date, you must request a new code. · [employer] Access Code: [email or web address] Expires: 3/21/2017  5:20 PM 
 
4. Enter the last four digits of your Social Security Number (xxxx) and Date of Birth (mm/dd/yyyy) as indicated and click Submit. You will be taken to the next sign-up page. 5. Create a [employer] ID. This will be your [employer] login ID and cannot be changed, so think of one that is secure and easy to remember. 6. Create a [employer] password. You can change your password at any time. 7. Enter your Password Reset Question and Answer. This can be used at a later time if you forget your password. 8. Enter your e-mail address. You will receive e-mail notification when new information is available in [address] 9. Click Sign Up. You can now view and download portions of your medical record. 10. Click the Download Summary menu link to download a portable copy of your medical information. If you have questions, please visit the Frequently Asked Questions section of the [employer] website. Remember, [employer] is NOT to be used for urgent needs. For medical emergencies, dial 911. Now available from your iPhone and Android! General Information Please provide this summary of care documentation to your next provider. Patient Signature:  ____________________________________________________________ Date:  ____________________________________________________________  
  
Valeriy Arteaga Provider Signature:  ____________________________________________________________ Date:  ____________________________________________________________

## 2017-03-01 NOTE — ANESTHESIA PREPROCEDURE EVALUATION
Anesthetic History   No history of anesthetic complications            Review of Systems / Medical History  Patient summary reviewed and pertinent labs reviewed    Pulmonary  Within defined limits                 Neuro/Psych   Within defined limits           Cardiovascular    Hypertension: well controlled              Exercise tolerance: >4 METS     GI/Hepatic/Renal     GERD: well controlled           Endo/Other        Arthritis and cancer (prostate)     Other Findings              Physical Exam    Airway  Mallampati: II  TM Distance: 4 - 6 cm  Neck ROM: normal range of motion   Mouth opening: Normal     Cardiovascular  Regular rate and rhythm,  S1 and S2 normal,  no murmur, click, rub, or gallop  Rhythm: regular  Rate: normal         Dental    Dentition: Caps/crowns     Pulmonary  Breath sounds clear to auscultation               Abdominal  GI exam deferred       Other Findings            Anesthetic Plan    ASA: 2  Anesthesia type: total IV anesthesia          Induction: Intravenous  Anesthetic plan and risks discussed with: Patient

## 2017-03-10 ENCOUNTER — HOSPITAL ENCOUNTER (OUTPATIENT)
Dept: RADIATION ONCOLOGY | Age: 72
Discharge: HOME | End: 2017-03-10
Payer: MEDICARE

## 2017-03-10 ENCOUNTER — HOSPITAL ENCOUNTER (OUTPATIENT)
Dept: MRI IMAGING | Age: 72
Discharge: HOME | End: 2017-03-10
Attending: RADIOLOGY
Payer: MEDICARE

## 2017-03-10 VITALS
OXYGEN SATURATION: 97 % | WEIGHT: 157.1 LBS | DIASTOLIC BLOOD PRESSURE: 105 MMHG | BODY MASS INDEX: 23.2 KG/M2 | TEMPERATURE: 98 F | HEART RATE: 63 BPM | RESPIRATION RATE: 18 BRPM | SYSTOLIC BLOOD PRESSURE: 183 MMHG

## 2017-03-10 DIAGNOSIS — C61 PROSTATE CANCER (HCC): ICD-10-CM

## 2017-03-10 PROCEDURE — 99211 OFF/OP EST MAY X REQ PHY/QHP: CPT

## 2017-03-10 NOTE — PROGRESS NOTES
Patient: Romero Shook MRN: [id number]  SSN: [id number]    YOB: 1945  Age: 73 y.o. Sex: male      Other Providers:  Italia Yu MD, Puma Reyes MD    CHIEF COMPLAINT: Prostate cancer    DIAGNOSIS: High risk prostate cancer, GS 4+5=9. 5/12 cores. PSA 11.9    PREVIOUS TREATMENT:  1) Watchful waiting after biopsy 2015  2) Repeat biopsy   3) 10/13/16 and again in January 2017:  Lupron 22.5 mg  4) 03/01/17: Space-Oar and Fudicials    HISTORY OF PRESENT ILLNESS:  Romero Shook is a 73 y.o. male see by Dr. Marvin Dhillon 01/27/17 at the request of Dr. Anamaria Palomares. He is a patient of Dr. Mike Sosa, previously followed by Dr. Edna Lipscomb at [facility]. He originally had a diagnosis of prostate cancer in 2015 when a biopsy showed [place] 6 disease in 1 core, 45%, with a PSA of 4.7, volume 21 cc. He elected for watchful waiting at that time. PSA at initial biopsy 4.7  Feb 2016 6.4  May 2016 5.9  Sept 2016 11.9    In 2016, he transferred care to Dr. Mike Sosa and PSA devin to 7.34, then to 11.9, prompting repeat biopsy of the prostate that then showed [place] 4+5 disease. Biopsy showed GS 4+5=9 in 3 cores between 35-45%, 1 core GS 4+4=8 in 35%, and 1 core 4+3=7 in 35% for a total of 5/12 cores. He was also having some right hip pain and pelvic discomfort which prompted systemic restaging. Bone scan revealed some small suspicious uptake in the sternum and in the left proximal femur, with corresponding findings on CT, along with an asymmetrically enlarged right pelvic sidewall internal iliac lymph node measuring 17 mm. These findings were felt to most likely represent metastatic disease. Dr. Mike Sosa started him on Lupron with a 22.5 mg dose given on 10/3/16. He was referred to medical oncology for evaluation and met with Dr. Anamaria Palomares. He recommended short term observation with repeat CT and bone scan after 3 months to determine whether the lesions responded to ADT, which would suggest stage IV disease.  CT showed resolution of pelvic adenopathy but bone lesions were stable. PSA was 0.6 consistent with excellent biochemical response but he continued to have pain in the left hip still. Dr. Sarah Benson was entertaining the possibilty that the bone lesions were not metastases. He chose to obtain MRI of the left hip, and if consistent with a nonneoplastic etiology which was seen 1/12/17, he recommend rad onc referral for consideration of local therapy which is the rationale for our visit today. INTERVAL HISTORY: Mr. Seth Hilliard returns today for consenting of radiation therapy followed by his simulation. He continues to feel well and has no new concerns. He continues on long term androgen deprivation therapy under the management of Dr. Irvin. He has received x2 - 3 month injections and is scheduled for a 6 month injection April 18th. GENITOURINARY ROS:  AUA 6. No major issues. He is able to get an erection      PAST MEDICAL HISTORY:    Past Medical History:   Diagnosis Date    Anxiety     Arthritis     Elevated prostate specific antigen (PSA) 1/15/2014    Former cigar smoker     GERD (gastroesophageal reflux disease)     Hypertension     Hypertrophy of prostate with urinary obstruction and other lower urinary tract symptoms (LUTS) 1/15/2014    Prostate cancer [facility])        The patient denies history of collagen vascular diseases, pacemaker insertion, prior radiation or prior chemotherapy. PAST SURGICAL HISTORY:   Past Surgical History:   Procedure Laterality Date    HX COLONOSCOPY      HX MALIGNANT SKIN LESION EXCISION      squamous cell - 2-3 times    HX TONSILLECTOMY  as child    LA PROSTATE BIOPSY, NEEDLE, SATURATION SAMPLING         MEDICATIONS:     Current Outpatient Prescriptions:     LORazepam (ATIVAN) 1 mg tablet, Take 1 Tab by mouth every six (6) hours as needed for Anxiety. Max Daily Amount: 4 mg. (Patient taking differently: Take 1 mg by mouth every six (6) hours as needed for Anxiety.  Indications: take on the dos if needed), Disp: 90 Tab, Rfl: 3    hydroCHLOROthiazide (HYDRODIURIL) 25 mg tablet, Take 25 mg by mouth every morning., Disp: , Rfl:     carvedilol (COREG) 6.25 mg tablet, Take 6.25 mg by mouth two (2) times daily (with meals). Indications: take on the dos, Disp: , Rfl:     aspirin delayed-release 81 mg tablet, Take  by mouth every morning. Indications: held for 5 days, Disp: , Rfl:     zolpidem (AMBIEN) 5 mg tablet, Take  by mouth nightly as needed for Sleep., Disp: , Rfl:     ranitidine (ZANTAC) 150 mg tablet, Take 150 mg by mouth nightly., Disp: , Rfl: 1    GLUC ALLEN/CHONDRO ALLEN A/VIT C/MN (GLUCOSAMINE 1500 COMPLEX PO), Take 1 Tab by mouth every morning. Indications: hold as of today, Disp: , Rfl:     MULTIVITAMIN WITH MINERALS (MULTIPLE VITAMIN-MINERALS PO), Take 0.5 Tabs by mouth every morning. Indications: hold until after surgery, Disp: , Rfl:     ALLERGIES:   No Known Allergies    SOCIAL HISTORY:   Social History     Social History    Marital status:      Spouse name: N/A    Number of children: N/A    Years of education: N/A     Occupational History    Not on file. Social History Main Topics    Smoking status: Former Smoker     Types: Cigars    Smokeless tobacco: Never Used    Alcohol use 3.6 - 4.8 oz/week     0 Standard drinks or equivalent, 6 - 8 Cans of beer per week    Drug use: No    Sexual activity: Not on file     Other Topics Concern    Not on file     Social History Narrative       FAMILY HISTORY:   Family History   Problem Relation Age of Onset    Heart Disease Father      pacemaker in his late [de-identified]       REVIEW OF SYSTEMS: Please see the completed review of systems sheet in the chart that I have reviewed today. PHYSICAL EXAMINATION:   ECOG Performance status 0  VITAL SIGNS: please note flow sheet     GENERAL: The patient is well-developed, ambulatory, alert and in no acute distress. Remainder of exam deferred today.     PATHOLOGY:    9/19/16:      DIAGNOSIS   A: \"PROSTATE, LEFT LATERAL BASE, BIOPSY\": PROSTATIC TISSUE WITH CHRONIC INFLAMMATION. B: \"PROSTATE, LEFT LATERAL MID, BIOPSY\": PROSTATIC TISSUE WITH CHRONIC INFLAMMATION. C: \"PROSTATE, LEFT LATERAL APEX, BIOPSY\": PROSTATIC TISSUE WITH FOCAL ACUTE AND CHRONIC INFLAMMATION. D: \"PROSTATE, LEFT BASE, BIOPSY\": PROSTATIC TISSUE WITH CHRONIC INFLAMMATION. E: \"PROSTATE, LEFT MID, BIOPSY\": PROSTATITIC TISSUE WITH FOCAL ACUTE AND CHRONIC INFLAMMATION. F: \"PROSTATE, LEFT APEX, BIOPSY\": PROSTATIC TISSUE, NO CARCINOMA IDENTIFIED. G: \"PROSTATE, RIGHT BASE, BIOPSY\": PROSTATIC ADENOCARCINOMA, DEBBIE SCORE   4 + 5 = 9, INVOLVING 40% OF BIOPSY. PERINEURAL INVASION PRESENT. H: \"PROSTATE, RIGHT MID, BIOPSY\": PROSTATIC ADENOCARCINOMA, DEBBIE SCORE   4 + 4 = 8, INVOLVING 35% OF BIOPSY. I: \"PROSTATE, RIGHT APEX, BIOPSY\": PROSTATIC TISSUE WITH A SMALL FOCUS OF ATYPICAL GLANDS SUSPICIOUS FOR CARCINOMA. J: \"PROSTATE, RIGHT LATERAL BASE, BIOPSY\": PROSTATIC ADENOCARCINOMA, DEBBIE SCORE 4 + 5 = 9, INVOLVING 45% OF BIOPSY. SUSPICIOUS FOR PERINEURAL INVASION. K: \"PROSTATE, RIGHT LATERAL MID, BIOPSY\": PROSTATIC ADENOCARCINOMA, DEBBIE SCORE 4 + 5 = 9, INVOLVING 45% OF BIOPSY. L: \"PROSTATE, RIGHT LATERAL APEX, BIOPSY\": PROSTATIC ADENOCARCINOMA, DEBBIE SCORE 4 + 3 = 7, INVOLVING 35% OF BIOPSY. LABORATORY: none today    RADIOLOGY:    Nm Bone Scan Wh Body    Result Date: 12/27/2016  Whole-body bone scan INDICATION:  Restaging prostate cancer Whole-body images were obtained after intravenous injection of 26.4 mCi of technetium HDP. COMPARISON:  09/29/2016 FINDINGS:  There is stable focal uptake in the intertrochanteric left hip. Uptake in the midsternum is also stable. There are no developing lesions in the ribs or spine. IMPRESSION:  1.  Stable uptake in the left hip, concerning for metastatic disease. CT appearance is nonspecific, this could also represent a benign chondroid lesion.  2.  Stable uptake in the midsternum. This is also indeterminate, benign uptake can be seen in this region. Mri Hip Cristofer Lucas Wo Cont    Result Date: 1/12/2017  History: Severe left hip pain. History of prostate cancer diagnosed one year ago. EXAM: MRI left hip with and without contrast TECHNIQUE: Multiplanar multisequence imaging is performed both before and after the uneventful ministration of 7 cc MultiHance. COMPARISON: Bone scan dated 12/27/2016 FINDINGS: The abnormal focus of uptake on the recent bone scan corresponds to a lobular area of increased T2 signal within the posterior aspect of the intertrochanteric portion of the left femur. This lesion most likely represents an enchondroma. No additional focal marrow lesions demonstrated. Degenerative disc signal noted in the low lumbar spine and pubic symphysis. There is normal signal at the common hamstring tendon origin and at the gluteal muscular insertion. There is a tear of the anterior superior acetabular labrum. No abnormal fluid collection or soft tissue mass. IMPRESSION: 1. The abnormality seen on the bone scan corresponds to an enchondroma within the intertrochanteric portion of the left femur posteriorly. 2. Tear of the anterior superior acetabular labrum on the left. 3. Degenerative changes of the low lumbar spine and pubic symphysis. 4. No osseous metastases seen within the left hip or pelvis. Ct Pelv W Cont    Addendum Date: 12/27/2016    Addendum: Addendum: The sclerotic, intertrochanteric left hip lesion is stable. CT appearance is nonspecific. While this could represent a bony metastatic lesion, could also represent in benign chondroid lesion. Result Date: 12/27/2016  CT of the pelvis INDICATION:  Prostate cancer Multiple axial images were obtained through the pelvis after intravenous injection of 100mL of Isovue 370. Radiation dose reduction techniques were used for this study:   All CT scans performed at this facility use one or all of the following: Automated exposure control, adjustment of the mA and/or kVp according to patient's size, iterative reconstruction. Compared with 09/29/2016. FINDINGS: The right pelvic sidewall lymph node described on the prior exam has completely resorbed. No residual mass or adenopathy is seen in the pelvis. The prostate is stable in size and appearance. No discrete mass is seen. There are no inflammatory changes or fluid collections in the pelvis. There are no significant bony lesions. IMPRESSION: Interval resolution of right pelvic adenopathy     IMPRESSION:  Bonnie Deng is a 73 y.o. male with initially felt to be metastatic prostate cancer but as of now, no changes in his bone findings, more consistent with high risk prostate cancer. PLAN:    1) A total of 45 Gy would be delivered to the pelvis and prostate gland followed by a 15 gray in 1 fraction HDR boost to        the prostate gland alone. 2) Long term androgen therapy under the management of urology - first dose 09/13/16 and January 2017, 22.5mg. He     is scheduled for a 6 month injection April 18th. 3) He will have CT simulation today with the intent of beginning radiation therapy in the next 1-2 weeks. We reviewed         the potential side effects of salvage radiation therapy. Written and verbal information provided. Consent was then           obtained. Easton Alejo NP   March 10, 2017       Portions of this note were copied from prior encounters and reviewed for accuracy, currency, and represent documentation and tasks completed during this encounter. I verify and attest these portions to be unchanged from prior visits.

## 2017-03-10 NOTE — PROGRESS NOTES
Pt here today for his C/T Sim in preparation for RT to the prostate. He completed SpaceOAR, and had an MRI Pelvis this morning. RT consents signed.

## 2017-03-23 ENCOUNTER — HOSPITAL ENCOUNTER (OUTPATIENT)
Dept: RADIATION ONCOLOGY | Age: 72
Discharge: HOME | End: 2017-03-23
Payer: MEDICARE

## 2017-03-23 PROCEDURE — 77301 RADIOTHERAPY DOSE PLAN IMRT: CPT

## 2017-03-23 PROCEDURE — 77399 UNLISTED PX MED RADJ PHYSICS: CPT

## 2017-03-23 PROCEDURE — 77300 RADIATION THERAPY DOSE PLAN: CPT

## 2017-03-23 PROCEDURE — 77338 DESIGN MLC DEVICE FOR IMRT: CPT

## 2017-03-29 ENCOUNTER — HOSPITAL ENCOUNTER (OUTPATIENT)
Dept: RADIATION ONCOLOGY | Age: 72
Discharge: HOME | End: 2017-03-29
Payer: MEDICARE

## 2017-03-29 PROCEDURE — 77385 HC IMRT TRMT DLVR SMPL: CPT

## 2017-03-30 ENCOUNTER — HOSPITAL ENCOUNTER (OUTPATIENT)
Dept: RADIATION ONCOLOGY | Age: 72
Discharge: HOME | End: 2017-03-30
Payer: MEDICARE

## 2017-03-30 PROCEDURE — 77385 HC IMRT TRMT DLVR SMPL: CPT

## 2017-03-31 ENCOUNTER — HOSPITAL ENCOUNTER (OUTPATIENT)
Dept: RADIATION ONCOLOGY | Age: 72
Discharge: HOME | End: 2017-03-31
Payer: MEDICARE

## 2017-03-31 PROCEDURE — 77385 HC IMRT TRMT DLVR SMPL: CPT

## 2017-04-03 ENCOUNTER — HOSPITAL ENCOUNTER (OUTPATIENT)
Dept: RADIATION ONCOLOGY | Age: 72
Discharge: HOME | End: 2017-04-03
Payer: MEDICARE

## 2017-04-03 PROCEDURE — 77385 HC IMRT TRMT DLVR SMPL: CPT

## 2017-04-04 ENCOUNTER — HOSPITAL ENCOUNTER (OUTPATIENT)
Dept: RADIATION ONCOLOGY | Age: 72
Discharge: HOME | End: 2017-04-04
Payer: MEDICARE

## 2017-04-04 PROCEDURE — 77385 HC IMRT TRMT DLVR SMPL: CPT

## 2017-04-04 PROCEDURE — 77336 RADIATION PHYSICS CONSULT: CPT

## 2017-04-05 ENCOUNTER — HOSPITAL ENCOUNTER (OUTPATIENT)
Dept: RADIATION ONCOLOGY | Age: 72
Discharge: HOME | End: 2017-04-05
Payer: MEDICARE

## 2017-04-05 PROCEDURE — 77385 HC IMRT TRMT DLVR SMPL: CPT

## 2017-04-05 NOTE — PROGRESS NOTES
Patient: Shantanu Estrada MRN: [id number]  SSN: [id number]    YOB: 1945  Age: 73 y.o. Sex: male      DIAGNOSIS: High risk prostate cancer, GS 4+5=9. 5/12 cores. PSA 11.9     PREVIOUS TREATMENT:  1) Watchful waiting after biopsy 2015  2) Repeat biopsy   3) 10/13/16 and again in January 2017: Lupron 22.5 mg  4) 03/01/17: Space-Oar and Fudicials    TREATMENT SITE:  Prostate and pelvic lymph nodes    DOSE and FRACTIONATION:  5/25 fractions, 900 cGy of 5000 cGy planned, 0/19 boost, 0 cGy of 3420 cGy planned. INTERVAL HISTORY:  Shantanu Estrada is a 73 y.o. male being treated for prostate cancer. He is doing well without complaints and being seen prior to treatment. :  Some weakness and burning week 1 which started with spaceOAR and fiducial placement  GI:  No issues through week 1    OBJECTIVE:  No findings. There were no vitals taken for this visit. Lab Results   Component Value Date/Time    Sodium 144 01/17/2017 10:37 AM    Potassium 4.2 01/17/2017 10:37 AM    Chloride 106 01/17/2017 10:37 AM    CO2 30 01/17/2017 10:37 AM    Anion gap 8 01/17/2017 10:37 AM    Glucose 106 01/17/2017 10:37 AM    BUN 19 01/17/2017 10:37 AM    Creatinine 1.06 01/17/2017 10:37 AM    GFR est AA >60 01/17/2017 10:37 AM    GFR est non-AA >60 01/17/2017 10:37 AM    Calcium 9.4 01/17/2017 10:37 AM    Albumin 3.8 01/17/2017 10:37 AM    Protein, total 7.0 01/17/2017 10:37 AM    Globulin 3.2 01/17/2017 10:37 AM    A-G Ratio 1.2 01/17/2017 10:37 AM    AST (SGOT) 34 01/17/2017 10:37 AM    ALT (SGPT) 34 01/17/2017 10:37 AM     Lab Results   Component Value Date/Time    WBC 6.6 01/17/2017 10:37 AM    HGB 15.0 01/17/2017 10:37 AM    HCT 45.7 01/17/2017 10:37 AM    PLATELET 861 39/56/1868 10:37 AM       ASSESSMENT and PLAN:  Shantanu Estrada is tolerating radiation as anticipated for the current dose and fraction. Ibuprofen for burning week 1. We will continue on as planned with another treatment visit anticipated next week. Dakota Mayers MD   April 5, 2017

## 2017-04-06 ENCOUNTER — HOSPITAL ENCOUNTER (OUTPATIENT)
Dept: RADIATION ONCOLOGY | Age: 72
Discharge: HOME | End: 2017-04-06
Payer: MEDICARE

## 2017-04-06 PROCEDURE — 77385 HC IMRT TRMT DLVR SMPL: CPT

## 2017-04-07 ENCOUNTER — HOSPITAL ENCOUNTER (OUTPATIENT)
Dept: RADIATION ONCOLOGY | Age: 72
Discharge: HOME | End: 2017-04-07
Payer: MEDICARE

## 2017-04-07 PROCEDURE — 77385 HC IMRT TRMT DLVR SMPL: CPT

## 2017-04-10 ENCOUNTER — HOSPITAL ENCOUNTER (OUTPATIENT)
Dept: RADIATION ONCOLOGY | Age: 72
Discharge: HOME | End: 2017-04-10
Payer: MEDICARE

## 2017-04-10 PROCEDURE — 77385 HC IMRT TRMT DLVR SMPL: CPT

## 2017-04-10 NOTE — PROGRESS NOTES
Patient: Edin Law MRN: [id number]  SSN: [id number]    YOB: 1945  Age: 73 y.o. Sex: male      DIAGNOSIS: High risk prostate cancer, GS 4+5=9. 5/12 cores. PSA 11.9     PREVIOUS TREATMENT:  1) Watchful waiting after biopsy 2015  2) Repeat biopsy   3) 10/13/16 and again in January 2017: Lupron 22.5 mg  4) 03/01/17: [facility] and F[facility]    TREATMENT SITE:  Prostate and pelvic lymph nodes    DOSE and FRACTIONATION:  9/25 fractions, 649450 cGy of 5000 cGy planned, 0/19 boost, 0 cGy of 3420 cGy planned. INTERVAL HISTORY:  Edin Law is a 73 y.o. male being treated for prostate cancer. He is doing well without complaints and being seen prior to treatment. :  Reports Nocturia x5 and fatigue  GI:  No issues through week 2    OBJECTIVE:  Appears tired. NAD   There were no vitals taken for this visit. Lab Results   Component Value Date/Time    Sodium 144 01/17/2017 10:37 AM    Potassium 4.2 01/17/2017 10:37 AM    Chloride 106 01/17/2017 10:37 AM    CO2 30 01/17/2017 10:37 AM    Anion gap 8 01/17/2017 10:37 AM    Glucose 106 01/17/2017 10:37 AM    BUN 19 01/17/2017 10:37 AM    Creatinine 1.06 01/17/2017 10:37 AM    GFR est AA >60 01/17/2017 10:37 AM    GFR est non-AA >60 01/17/2017 10:37 AM    Calcium 9.4 01/17/2017 10:37 AM    Albumin 3.8 01/17/2017 10:37 AM    Protein, total 7.0 01/17/2017 10:37 AM    Globulin 3.2 01/17/2017 10:37 AM    A-G Ratio 1.2 01/17/2017 10:37 AM    AST (SGOT) 34 01/17/2017 10:37 AM    ALT (SGPT) 34 01/17/2017 10:37 AM     Lab Results   Component Value Date/Time    WBC 6.6 01/17/2017 10:37 AM    HGB 15.0 01/17/2017 10:37 AM    HCT 45.7 01/17/2017 10:37 AM    PLATELET 006 44/95/7421 10:37 AM       ASSESSMENT and PLAN:  Edin Law is tolerating radiation as anticipated for the current dose and fraction. Flomax prescribed. We will continue on as planned with another treatment visit anticipated next week.         Falguni Mejia MD   April 10, 2017

## 2017-04-11 ENCOUNTER — HOSPITAL ENCOUNTER (OUTPATIENT)
Dept: RADIATION ONCOLOGY | Age: 72
Discharge: HOME | End: 2017-04-11
Payer: MEDICARE

## 2017-04-11 PROCEDURE — 77385 HC IMRT TRMT DLVR SMPL: CPT

## 2017-04-11 PROCEDURE — 77336 RADIATION PHYSICS CONSULT: CPT

## 2017-04-12 ENCOUNTER — HOSPITAL ENCOUNTER (OUTPATIENT)
Dept: RADIATION ONCOLOGY | Age: 72
Discharge: HOME | End: 2017-04-12
Payer: MEDICARE

## 2017-04-12 PROCEDURE — 77385 HC IMRT TRMT DLVR SMPL: CPT

## 2017-04-13 ENCOUNTER — HOSPITAL ENCOUNTER (OUTPATIENT)
Dept: RADIATION ONCOLOGY | Age: 72
Discharge: HOME | End: 2017-04-13
Payer: MEDICARE

## 2017-04-13 PROCEDURE — 77385 HC IMRT TRMT DLVR SMPL: CPT

## 2017-04-17 ENCOUNTER — HOSPITAL ENCOUNTER (OUTPATIENT)
Dept: RADIATION ONCOLOGY | Age: 72
Discharge: HOME | End: 2017-04-17
Payer: MEDICARE

## 2017-04-17 PROCEDURE — 77385 HC IMRT TRMT DLVR SMPL: CPT

## 2017-04-17 RX ORDER — TAMSULOSIN HYDROCHLORIDE 0.4 MG/1
0.4 CAPSULE ORAL DAILY
Qty: 90 CAP | Refills: 1 | Status: SHIPPED | OUTPATIENT
Start: 2017-04-17 | End: 2017-09-15 | Stop reason: SDUPTHER

## 2017-04-17 NOTE — PROGRESS NOTES
Patient: Selvin Crandall MRN: [id number]  SSN: [id number]    YOB: 1945  Age: 73 y.o. Sex: male      DIAGNOSIS: High risk prostate cancer, GS 4+5=9. 5/12 cores. PSA 11.9     PREVIOUS TREATMENT:  1) Watchful waiting after biopsy 2015  2) Repeat biopsy   3) 10/13/16 and again in January 2017: Lupron 22.5 mg  4) 03/01/17: [facility] and Fud[facility]s    TREATMENT SITE:  Prostate and pelvic lymph nodes    DOSE and FRACTIONATION:  13/25 fractions, 2340 cGy of 5000 cGy planned, 0/19 boost, 0 cGy of 3420 cGy planned. INTERVAL HISTORY:  Selvin Crandall is a 73 y.o. male being treated for prostate cancer. He is doing well without complaints and being seen prior to treatment. :  Reports Nocturia x5 and fatigue week 2. Started flomax week 2 and doing better week 3, nocturia x 2. GI:  No issues through week 2    OBJECTIVE:  Appears tired. NAD   There were no vitals taken for this visit. Lab Results   Component Value Date/Time    Sodium 144 01/17/2017 10:37 AM    Potassium 4.2 01/17/2017 10:37 AM    Chloride 106 01/17/2017 10:37 AM    CO2 30 01/17/2017 10:37 AM    Anion gap 8 01/17/2017 10:37 AM    Glucose 106 01/17/2017 10:37 AM    BUN 19 01/17/2017 10:37 AM    Creatinine 1.06 01/17/2017 10:37 AM    GFR est AA >60 01/17/2017 10:37 AM    GFR est non-AA >60 01/17/2017 10:37 AM    Calcium 9.4 01/17/2017 10:37 AM    Albumin 3.8 01/17/2017 10:37 AM    Protein, total 7.0 01/17/2017 10:37 AM    Globulin 3.2 01/17/2017 10:37 AM    A-G Ratio 1.2 01/17/2017 10:37 AM    AST (SGOT) 34 01/17/2017 10:37 AM    ALT (SGPT) 34 01/17/2017 10:37 AM     Lab Results   Component Value Date/Time    WBC 6.6 01/17/2017 10:37 AM    HGB 15.0 01/17/2017 10:37 AM    HCT 45.7 01/17/2017 10:37 AM    PLATELET 286 55/90/9078 10:37 AM       ASSESSMENT and PLAN:  Selvin Crandall is tolerating radiation as anticipated for the current dose and fraction. Flomax prescribed week 2 and will continue.  We will continue on as planned with another treatment visit anticipated next week.         Christiana Adame MD   April 17, 2017

## 2017-04-18 ENCOUNTER — HOSPITAL ENCOUNTER (OUTPATIENT)
Dept: LAB | Age: 72
Discharge: HOME | End: 2017-04-18
Payer: MEDICARE

## 2017-04-18 ENCOUNTER — HOSPITAL ENCOUNTER (OUTPATIENT)
Dept: RADIATION ONCOLOGY | Age: 72
Discharge: HOME | End: 2017-04-18
Payer: MEDICARE

## 2017-04-18 DIAGNOSIS — R97.20 ELEVATED PROSTATE SPECIFIC ANTIGEN (PSA): ICD-10-CM

## 2017-04-18 LAB
ALBUMIN SERPL BCP-MCNC: 3.7 G/DL (ref 3.2–4.6)
ALBUMIN/GLOB SERPL: 1.2 {RATIO} (ref 1.2–3.5)
ALP SERPL-CCNC: 63 U/L (ref 50–136)
ALT SERPL-CCNC: 84 U/L (ref 12–65)
ANION GAP BLD CALC-SCNC: 6 MMOL/L (ref 7–16)
AST SERPL W P-5'-P-CCNC: 45 U/L (ref 15–37)
BASOPHILS # BLD AUTO: 0 K/UL (ref 0–0.2)
BASOPHILS # BLD: 1 % (ref 0–2)
BILIRUB SERPL-MCNC: 0.9 MG/DL (ref 0.2–1.1)
BUN SERPL-MCNC: 17 MG/DL (ref 8–23)
CALCIUM SERPL-MCNC: 9.4 MG/DL (ref 8.3–10.4)
CHLORIDE SERPL-SCNC: 105 MMOL/L (ref 98–107)
CO2 SERPL-SCNC: 31 MMOL/L (ref 21–32)
CREAT SERPL-MCNC: 1 MG/DL (ref 0.8–1.5)
DIFFERENTIAL METHOD BLD: ABNORMAL
EOSINOPHIL # BLD: 0.2 K/UL (ref 0–0.8)
EOSINOPHIL NFR BLD: 6 % (ref 0.5–7.8)
ERYTHROCYTE [DISTWIDTH] IN BLOOD BY AUTOMATED COUNT: 12.6 % (ref 11.9–14.6)
GLOBULIN SER CALC-MCNC: 3.2 G/DL (ref 2.3–3.5)
GLUCOSE SERPL-MCNC: 105 MG/DL (ref 65–100)
HCT VFR BLD AUTO: 41.9 % (ref 41.1–50.3)
HGB BLD-MCNC: 13.9 G/DL (ref 13.6–17.2)
LYMPHOCYTES # BLD AUTO: 15 % (ref 13–44)
LYMPHOCYTES # BLD: 0.6 K/UL (ref 0.5–4.6)
MCH RBC QN AUTO: 30.3 PG (ref 26.1–32.9)
MCHC RBC AUTO-ENTMCNC: 33.2 G/DL (ref 31.4–35)
MCV RBC AUTO: 91.5 FL (ref 79.6–97.8)
MONOCYTES # BLD: 0.3 K/UL (ref 0.1–1.3)
MONOCYTES NFR BLD AUTO: 8 % (ref 4–12)
NEUTS SEG # BLD: 2.9 K/UL (ref 1.7–8.2)
NEUTS SEG NFR BLD AUTO: 70 % (ref 43–78)
NRBC # BLD: 0 K/UL (ref 0–0.2)
PLATELET # BLD AUTO: 203 K/UL (ref 150–450)
PMV BLD AUTO: 8.8 FL (ref 10.8–14.1)
POTASSIUM SERPL-SCNC: 3.4 MMOL/L (ref 3.5–5.1)
PROT SERPL-MCNC: 6.9 G/DL (ref 6.3–8.2)
PSA SERPL-MCNC: 0.3 NG/ML
RBC # BLD AUTO: 4.58 M/UL (ref 4.23–5.67)
SODIUM SERPL-SCNC: 142 MMOL/L (ref 136–145)
WBC # BLD AUTO: 4.1 K/UL (ref 4.3–11.1)

## 2017-04-18 PROCEDURE — 77385 HC IMRT TRMT DLVR SMPL: CPT

## 2017-04-19 ENCOUNTER — HOSPITAL ENCOUNTER (OUTPATIENT)
Dept: INFUSION THERAPY | Age: 72
Discharge: HOME | End: 2017-04-19
Payer: MEDICARE

## 2017-04-19 ENCOUNTER — HOSPITAL ENCOUNTER (OUTPATIENT)
Dept: RADIATION ONCOLOGY | Age: 72
Discharge: HOME | End: 2017-04-19
Payer: MEDICARE

## 2017-04-19 VITALS
TEMPERATURE: 98.1 F | RESPIRATION RATE: 18 BRPM | DIASTOLIC BLOOD PRESSURE: 89 MMHG | BODY MASS INDEX: 23.18 KG/M2 | WEIGHT: 157 LBS | OXYGEN SATURATION: 97 % | HEART RATE: 71 BPM | SYSTOLIC BLOOD PRESSURE: 141 MMHG

## 2017-04-19 DIAGNOSIS — C61 PROSTATE CANCER (HCC): ICD-10-CM

## 2017-04-19 PROCEDURE — 96402 CHEMO HORMON ANTINEOPL SQ/IM: CPT

## 2017-04-19 PROCEDURE — 74011250636 HC RX REV CODE- 250/636: Performed by: INTERNAL MEDICINE

## 2017-04-19 PROCEDURE — 77385 HC IMRT TRMT DLVR SMPL: CPT

## 2017-04-19 PROCEDURE — 77336 RADIATION PHYSICS CONSULT: CPT

## 2017-04-19 RX ADMIN — LEUPROLIDE ACETATE 45 MG: KIT at 09:37

## 2017-04-19 NOTE — PROGRESS NOTES
Arrived to the [facility]. Lupron completed. Patient tolerated well. Any issues or concerns during appointment: none. Patient aware of next infusion appointment on 10/18/17 at 11:15am.  Discharged ambulatory.

## 2017-04-20 ENCOUNTER — HOSPITAL ENCOUNTER (OUTPATIENT)
Dept: RADIATION ONCOLOGY | Age: 72
Discharge: HOME | End: 2017-04-20
Payer: MEDICARE

## 2017-04-20 PROCEDURE — 77385 HC IMRT TRMT DLVR SMPL: CPT

## 2017-04-21 ENCOUNTER — HOSPITAL ENCOUNTER (OUTPATIENT)
Dept: RADIATION ONCOLOGY | Age: 72
Discharge: HOME | End: 2017-04-21
Payer: MEDICARE

## 2017-04-21 PROCEDURE — 77385 HC IMRT TRMT DLVR SMPL: CPT

## 2017-04-21 NOTE — PROGRESS NOTES
Patient: Hemalatha Ahuja MRN: [id number]  SSN: [id number]    YOB: 1945  Age: 73 y.o. Sex: male      DIAGNOSIS: High risk prostate cancer, GS 4+5=9. 5/12 cores. PSA 11.9     PREVIOUS TREATMENT:  1) Watchful waiting after biopsy 2015  2) Repeat biopsy   3) 10/13/16 and again in January 2017: Lupron 22.5 mg  4) 03/01/17: [facility] and Fudicial[facility]    TREATMENT SITE:  Prostate and pelvic lymph nodes    DOSE and FRACTIONATION:  17/25 fractions, 3060 cGy of 5000 cGy planned, 0/19 boost, 0 cGy of 3420 cGy planned. INTERVAL HISTORY:  Hemalatha Ahuja is a 73 y.o. male being treated for prostate cancer. He is doing well without complaints and being seen prior to treatment. :  Reports Nocturia x5 and fatigue week 2. Started flomax week 2 and doing better week 3, nocturia x 2. Again continued to be doing better week 4. GI:  No issues through week 2    OBJECTIVE:  Appears tired. NAD   There were no vitals taken for this visit. Lab Results   Component Value Date/Time    Sodium 142 04/18/2017 09:10 AM    Potassium 3.4 04/18/2017 09:10 AM    Chloride 105 04/18/2017 09:10 AM    CO2 31 04/18/2017 09:10 AM    Anion gap 6 04/18/2017 09:10 AM    Glucose 105 04/18/2017 09:10 AM    BUN 17 04/18/2017 09:10 AM    Creatinine 1.00 04/18/2017 09:10 AM    GFR est AA >60 04/18/2017 09:10 AM    GFR est non-AA >60 04/18/2017 09:10 AM    Calcium 9.4 04/18/2017 09:10 AM    Albumin 3.7 04/18/2017 09:10 AM    Protein, total 6.9 04/18/2017 09:10 AM    Globulin 3.2 04/18/2017 09:10 AM    A-G Ratio 1.2 04/18/2017 09:10 AM    AST (SGOT) 45 04/18/2017 09:10 AM    ALT (SGPT) 84 04/18/2017 09:10 AM     Lab Results   Component Value Date/Time    WBC 4.1 04/18/2017 09:10 AM    HGB 13.9 04/18/2017 09:10 AM    HCT 41.9 04/18/2017 09:10 AM    PLATELET 238 22/29/3066 09:10 AM       ASSESSMENT and PLAN:  Hemalatha Ahuja is tolerating radiation as anticipated for the current dose and fraction. Flomax prescribed week 2 and will continue. We will continue on as planned with another treatment visit anticipated next week.         Hi Summers MD   April 21, 2017

## 2017-04-24 ENCOUNTER — HOSPITAL ENCOUNTER (OUTPATIENT)
Dept: RADIATION ONCOLOGY | Age: 72
Discharge: HOME | End: 2017-04-24
Payer: MEDICARE

## 2017-04-24 PROCEDURE — 77385 HC IMRT TRMT DLVR SMPL: CPT

## 2017-04-25 ENCOUNTER — HOSPITAL ENCOUNTER (OUTPATIENT)
Dept: RADIATION ONCOLOGY | Age: 72
Discharge: HOME | End: 2017-04-25
Payer: MEDICARE

## 2017-04-25 PROCEDURE — 77385 HC IMRT TRMT DLVR SMPL: CPT

## 2017-04-26 ENCOUNTER — HOSPITAL ENCOUNTER (OUTPATIENT)
Dept: RADIATION ONCOLOGY | Age: 72
Discharge: HOME | End: 2017-04-26
Payer: MEDICARE

## 2017-04-26 PROCEDURE — 77385 HC IMRT TRMT DLVR SMPL: CPT

## 2017-04-27 ENCOUNTER — HOSPITAL ENCOUNTER (OUTPATIENT)
Dept: RADIATION ONCOLOGY | Age: 72
Discharge: HOME | End: 2017-04-27
Payer: MEDICARE

## 2017-04-27 PROCEDURE — 77385 HC IMRT TRMT DLVR SMPL: CPT

## 2017-04-27 PROCEDURE — 77336 RADIATION PHYSICS CONSULT: CPT

## 2017-04-27 PROCEDURE — 77300 RADIATION THERAPY DOSE PLAN: CPT

## 2017-04-28 ENCOUNTER — HOSPITAL ENCOUNTER (OUTPATIENT)
Dept: RADIATION ONCOLOGY | Age: 72
Discharge: HOME | End: 2017-04-28
Payer: MEDICARE

## 2017-04-28 PROCEDURE — 77338 DESIGN MLC DEVICE FOR IMRT: CPT

## 2017-04-28 PROCEDURE — 77385 HC IMRT TRMT DLVR SMPL: CPT

## 2017-05-01 ENCOUNTER — HOSPITAL ENCOUNTER (OUTPATIENT)
Dept: RADIATION ONCOLOGY | Age: 72
Discharge: HOME | End: 2017-05-01
Payer: MEDICARE

## 2017-05-01 PROCEDURE — 77385 HC IMRT TRMT DLVR SMPL: CPT

## 2017-05-01 NOTE — PROGRESS NOTES
Patient: Dwana Seip MRN: [id number]  SSN: [id number]    YOB: 1945  Age: 73 y.o. Sex: male      DIAGNOSIS: High risk prostate cancer, GS 4+5=9. 5/12 cores. PSA 11.9     PREVIOUS TREATMENT:  1) Watchful waiting after biopsy 2015  2) Repeat biopsy   3) 10/13/16 and again in January 2017: Lupron 22.5 mg  4) 03/01/17: [facility] and Fudicials    TREATMENT SITE:  Prostate and pelvic lymph nodes    DOSE and FRACTIONATION:  23/25 fractions, 4140 cGy of 5000 cGy planned, 0/19 boost, 0 cGy of 3420 cGy planned. INTERVAL HISTORY:  Dwana Seip is a 73 y.o. male being treated for prostate cancer. He is doing well without complaints and being seen prior to treatment. :  Reports Nocturia x 5 and fatigue week 2. Started flomax week 2 and doing better week 3, nocturia x 2. Again continued to be doing better week 4. GI:  No issues through week 4. Looser stools week 5. OBJECTIVE:  Appears tired. NAD   There were no vitals taken for this visit. Lab Results   Component Value Date/Time    Sodium 142 04/18/2017 09:10 AM    Potassium 3.4 04/18/2017 09:10 AM    Chloride 105 04/18/2017 09:10 AM    CO2 31 04/18/2017 09:10 AM    Anion gap 6 04/18/2017 09:10 AM    Glucose 105 04/18/2017 09:10 AM    BUN 17 04/18/2017 09:10 AM    Creatinine 1.00 04/18/2017 09:10 AM    GFR est AA >60 04/18/2017 09:10 AM    GFR est non-AA >60 04/18/2017 09:10 AM    Calcium 9.4 04/18/2017 09:10 AM    Albumin 3.7 04/18/2017 09:10 AM    Protein, total 6.9 04/18/2017 09:10 AM    Globulin 3.2 04/18/2017 09:10 AM    A-G Ratio 1.2 04/18/2017 09:10 AM    AST (SGOT) 45 04/18/2017 09:10 AM    ALT (SGPT) 84 04/18/2017 09:10 AM     Lab Results   Component Value Date/Time    WBC 4.1 04/18/2017 09:10 AM    HGB 13.9 04/18/2017 09:10 AM    HCT 41.9 04/18/2017 09:10 AM    PLATELET 939 47/05/6085 09:10 AM       ASSESSMENT and PLAN:  Dwana Seip is tolerating radiation as anticipated for the current dose and fraction.  Flomax prescribed week 2 and will continue. We will continue on as planned with another treatment visit anticipated next week.         Adam Haddad MD   May 1, 2017

## 2017-05-02 ENCOUNTER — HOSPITAL ENCOUNTER (OUTPATIENT)
Dept: RADIATION ONCOLOGY | Age: 72
Discharge: HOME | End: 2017-05-02
Payer: MEDICARE

## 2017-05-02 PROCEDURE — 77385 HC IMRT TRMT DLVR SMPL: CPT

## 2017-05-03 ENCOUNTER — HOSPITAL ENCOUNTER (OUTPATIENT)
Dept: RADIATION ONCOLOGY | Age: 72
Discharge: HOME | End: 2017-05-03
Payer: MEDICARE

## 2017-05-03 PROCEDURE — 77385 HC IMRT TRMT DLVR SMPL: CPT

## 2017-05-04 ENCOUNTER — HOSPITAL ENCOUNTER (OUTPATIENT)
Dept: RADIATION ONCOLOGY | Age: 72
Discharge: HOME | End: 2017-05-04
Payer: MEDICARE

## 2017-05-04 PROCEDURE — 77385 HC IMRT TRMT DLVR SMPL: CPT

## 2017-05-04 PROCEDURE — 77300 RADIATION THERAPY DOSE PLAN: CPT

## 2017-05-05 ENCOUNTER — HOSPITAL ENCOUNTER (OUTPATIENT)
Dept: RADIATION ONCOLOGY | Age: 72
Discharge: HOME | End: 2017-05-05
Payer: MEDICARE

## 2017-05-05 PROCEDURE — 77385 HC IMRT TRMT DLVR SMPL: CPT

## 2017-05-08 ENCOUNTER — HOSPITAL ENCOUNTER (OUTPATIENT)
Dept: RADIATION ONCOLOGY | Age: 72
Discharge: HOME | End: 2017-05-08
Payer: MEDICARE

## 2017-05-08 PROCEDURE — 77385 HC IMRT TRMT DLVR SMPL: CPT

## 2017-05-08 PROCEDURE — 77338 DESIGN MLC DEVICE FOR IMRT: CPT

## 2017-05-08 NOTE — PROGRESS NOTES
Patient: Dakota Santo MRN: [id number]  SSN: [id number]    YOB: 1945  Age: 73 y.o. Sex: male      DIAGNOSIS: High risk prostate cancer, GS 4+5=9. 5/12 cores. PSA 11.9     PREVIOUS TREATMENT:  1) Watchful waiting after biopsy 2015  2) Repeat biopsy   3) 10/13/16 and again in January 2017: Lupron 22.5 mg  4) 03/01/17: [facility] and Fudicials    TREATMENT SITE:  Prostate and pelvic lymph nodes    DOSE and FRACTIONATION:  25/25 fractions, 4500 cGy of 5000 cGy planned, 3/19 boost, 540 cGy of 3420 cGy planned. INTERVAL HISTORY:  Dakota Santo is a 73 y.o. male being treated for prostate cancer. He is doing well without complaints. :  Reports Nocturia x 5 and fatigue week 2. Started flomax week 2 and doing better week 3, nocturia x 2. Again continued to be doing better week 4. Week 6 with continued mild fatigue. GI:  No issues through week 4. Looser stools week 5. No major change week 6. OBJECTIVE:  Appears tired. NAD   There were no vitals taken for this visit.     Lab Results   Component Value Date/Time    Sodium 142 04/18/2017 09:10 AM    Potassium 3.4 04/18/2017 09:10 AM    Chloride 105 04/18/2017 09:10 AM    CO2 31 04/18/2017 09:10 AM    Anion gap 6 04/18/2017 09:10 AM    Glucose 105 04/18/2017 09:10 AM    BUN 17 04/18/2017 09:10 AM    Creatinine 1.00 04/18/2017 09:10 AM    GFR est AA >60 04/18/2017 09:10 AM    GFR est non-AA >60 04/18/2017 09:10 AM    Calcium 9.4 04/18/2017 09:10 AM    Albumin 3.7 04/18/2017 09:10 AM    Protein, total 6.9 04/18/2017 09:10 AM    Globulin 3.2 04/18/2017 09:10 AM    A-G Ratio 1.2 04/18/2017 09:10 AM    AST (SGOT) 45 04/18/2017 09:10 AM    ALT (SGPT) 84 04/18/2017 09:10 AM     Lab Results   Component Value Date/Time    WBC 4.1 04/18/2017 09:10 AM    HGB 13.9 04/18/2017 09:10 AM    HCT 41.9 04/18/2017 09:10 AM    PLATELET 587 05/04/2260 09:10 AM       ASSESSMENT and PLAN:  Dakota Santo is tolerating radiation as anticipated for the current dose and fraction. Flomax prescribed week 2 and will continue. We will continue on as planned with another treatment visit anticipated next week.         Christiana Adame MD   May 8, 2017

## 2017-05-09 ENCOUNTER — HOSPITAL ENCOUNTER (OUTPATIENT)
Dept: RADIATION ONCOLOGY | Age: 72
Discharge: HOME | End: 2017-05-09
Payer: MEDICARE

## 2017-05-09 PROCEDURE — 77385 HC IMRT TRMT DLVR SMPL: CPT

## 2017-05-10 ENCOUNTER — HOSPITAL ENCOUNTER (OUTPATIENT)
Dept: RADIATION ONCOLOGY | Age: 72
Discharge: HOME | End: 2017-05-10
Payer: MEDICARE

## 2017-05-10 PROCEDURE — 77385 HC IMRT TRMT DLVR SMPL: CPT

## 2017-05-10 PROCEDURE — 77336 RADIATION PHYSICS CONSULT: CPT

## 2017-05-11 ENCOUNTER — HOSPITAL ENCOUNTER (OUTPATIENT)
Dept: RADIATION ONCOLOGY | Age: 72
Discharge: HOME | End: 2017-05-11
Payer: MEDICARE

## 2017-05-11 PROCEDURE — 77385 HC IMRT TRMT DLVR SMPL: CPT

## 2017-05-12 ENCOUNTER — HOSPITAL ENCOUNTER (OUTPATIENT)
Dept: RADIATION ONCOLOGY | Age: 72
Discharge: HOME | End: 2017-05-12
Payer: MEDICARE

## 2017-05-12 PROCEDURE — 77385 HC IMRT TRMT DLVR SMPL: CPT

## 2017-05-15 ENCOUNTER — HOSPITAL ENCOUNTER (OUTPATIENT)
Dept: RADIATION ONCOLOGY | Age: 72
Discharge: HOME | End: 2017-05-15
Payer: MEDICARE

## 2017-05-15 VITALS
SYSTOLIC BLOOD PRESSURE: 136 MMHG | HEART RATE: 81 BPM | DIASTOLIC BLOOD PRESSURE: 100 MMHG | OXYGEN SATURATION: 100 % | TEMPERATURE: 97.8 F

## 2017-05-15 PROCEDURE — 77385 HC IMRT TRMT DLVR SMPL: CPT

## 2017-05-15 NOTE — PROGRESS NOTES
Patient: Reddy Yeager MRN: [id number]  SSN: [id number]    YOB: 1945  Age: 73 y.o. Sex: male      DIAGNOSIS: High risk prostate cancer, GS 4+5=9. 5/12 cores. PSA 11.9     PREVIOUS TREATMENT:  1) Watchful waiting after biopsy 2015  2) Repeat biopsy   3) 10/13/16 and again in January 2017: Lupron 22.5 mg  4) 03/01/17: [facility] and Fudicials    TREATMENT SITE:  Prostate and pelvic lymph nodes    DOSE and FRACTIONATION:  25/25 fractions, 4500 cGy of 5000 cGy planned, 8/19 boost, 1440 cGy of 3420 cGy planned. INTERVAL HISTORY:  Reddy Yeager is a 73 y.o. male being treated for prostate cancer. He is doing well but week 7 feeling run down. :  Reports Nocturia x 5 and fatigue week 2. Started flomax week 2 and doing better week 3, nocturia x 2. Again continued to be doing better week 4. Week 6-7 with continued mild fatigue. GI:  No issues through week 4. Looser stools week 5. No major change week 6-7. OBJECTIVE:  Appears tired.  NAD   Visit Vitals    BP (!) 136/100 (BP 1 Location: Left arm, BP Patient Position: Standing)    Pulse 81    Temp 97.8 °F (36.6 °C) (Oral)    SpO2 100%       Lab Results   Component Value Date/Time    Sodium 142 04/18/2017 09:10 AM    Potassium 3.4 04/18/2017 09:10 AM    Chloride 105 04/18/2017 09:10 AM    CO2 31 04/18/2017 09:10 AM    Anion gap 6 04/18/2017 09:10 AM    Glucose 105 04/18/2017 09:10 AM    BUN 17 04/18/2017 09:10 AM    Creatinine 1.00 04/18/2017 09:10 AM    GFR est AA >60 04/18/2017 09:10 AM    GFR est non-AA >60 04/18/2017 09:10 AM    Calcium 9.4 04/18/2017 09:10 AM    Albumin 3.7 04/18/2017 09:10 AM    Protein, total 6.9 04/18/2017 09:10 AM    Globulin 3.2 04/18/2017 09:10 AM    A-G Ratio 1.2 04/18/2017 09:10 AM    AST (SGOT) 45 04/18/2017 09:10 AM    ALT (SGPT) 84 04/18/2017 09:10 AM     Lab Results   Component Value Date/Time    WBC 4.1 04/18/2017 09:10 AM    HGB 13.9 04/18/2017 09:10 AM    HCT 41.9 04/18/2017 09:10 AM    PLATELET 320 04/18/2017 09:10 AM       ASSESSMENT and PLAN:  Lamont Clayton is tolerating radiation as anticipated for the current dose and fraction. Flomax prescribed week 2 and will continue. Encouraged about feeling run down week 7 but will make no changes. We will continue on as planned with another treatment visit anticipated next week.         Elis Mcdonnell MD   May 15, 2017

## 2017-05-16 ENCOUNTER — HOSPITAL ENCOUNTER (OUTPATIENT)
Dept: RADIATION ONCOLOGY | Age: 72
Discharge: HOME | End: 2017-05-16
Payer: MEDICARE

## 2017-05-16 PROCEDURE — 77385 HC IMRT TRMT DLVR SMPL: CPT

## 2017-05-17 ENCOUNTER — HOSPITAL ENCOUNTER (OUTPATIENT)
Dept: RADIATION ONCOLOGY | Age: 72
Discharge: HOME | End: 2017-05-17
Payer: MEDICARE

## 2017-05-17 PROCEDURE — 77385 HC IMRT TRMT DLVR SMPL: CPT

## 2017-05-17 PROCEDURE — 77336 RADIATION PHYSICS CONSULT: CPT

## 2017-05-18 ENCOUNTER — HOSPITAL ENCOUNTER (OUTPATIENT)
Dept: RADIATION ONCOLOGY | Age: 72
Discharge: HOME | End: 2017-05-18
Payer: MEDICARE

## 2017-05-18 PROCEDURE — 77385 HC IMRT TRMT DLVR SMPL: CPT

## 2017-05-19 ENCOUNTER — HOSPITAL ENCOUNTER (OUTPATIENT)
Dept: RADIATION ONCOLOGY | Age: 72
Discharge: HOME | End: 2017-05-19
Payer: MEDICARE

## 2017-05-19 PROCEDURE — 77385 HC IMRT TRMT DLVR SMPL: CPT

## 2017-05-22 ENCOUNTER — HOSPITAL ENCOUNTER (OUTPATIENT)
Dept: RADIATION ONCOLOGY | Age: 72
Discharge: HOME | End: 2017-05-22
Payer: MEDICARE

## 2017-05-22 PROCEDURE — 77385 HC IMRT TRMT DLVR SMPL: CPT

## 2017-05-22 NOTE — PROGRESS NOTES
Patient: Lamont Clayton MRN: [id number]  SSN: [id number]    YOB: 1945  Age: 73 y.o. Sex: male      DIAGNOSIS: High risk prostate cancer, GS 4+5=9. 5/12 cores. PSA 11.9     PREVIOUS TREATMENT:  1) Watchful waiting after biopsy 2015  2) Repeat biopsy   3) 10/13/16 and again in January 2017: Lupron 22.5 mg  4) 03/01/17: [facility] and Fudicials    TREATMENT SITE:  Prostate and pelvic lymph nodes    DOSE and FRACTIONATION:  25/25 fractions, 4500 cGy of 5000 cGy planned, 13/19 boost, 2360 cGy of 3420 cGy planned. INTERVAL HISTORY:  Lamont Clayton is a 73 y.o. male being treated for prostate cancer. He is doing well but week 7 feeling run down. Week 8 with continued mild fatigue. :  Reports Nocturia x 5 and fatigue week 2. Started flomax week 2 and doing better week 3, nocturia x 2. Again continued to be doing better week 4. GI:  No issues through week 4. Looser stools week 5. No major change week 6-7. OBJECTIVE:  Appears tired. NAD   There were no vitals taken for this visit.     Lab Results   Component Value Date/Time    Sodium 142 04/18/2017 09:10 AM    Potassium 3.4 04/18/2017 09:10 AM    Chloride 105 04/18/2017 09:10 AM    CO2 31 04/18/2017 09:10 AM    Anion gap 6 04/18/2017 09:10 AM    Glucose 105 04/18/2017 09:10 AM    BUN 17 04/18/2017 09:10 AM    Creatinine 1.00 04/18/2017 09:10 AM    GFR est AA >60 04/18/2017 09:10 AM    GFR est non-AA >60 04/18/2017 09:10 AM    Calcium 9.4 04/18/2017 09:10 AM    Albumin 3.7 04/18/2017 09:10 AM    Protein, total 6.9 04/18/2017 09:10 AM    Globulin 3.2 04/18/2017 09:10 AM    A-G Ratio 1.2 04/18/2017 09:10 AM    AST (SGOT) 45 04/18/2017 09:10 AM    ALT (SGPT) 84 04/18/2017 09:10 AM     Lab Results   Component Value Date/Time    WBC 4.1 04/18/2017 09:10 AM    HGB 13.9 04/18/2017 09:10 AM    HCT 41.9 04/18/2017 09:10 AM    PLATELET 366 16/44/9601 09:10 AM       ASSESSMENT and PLAN:  Lamont Clayton is tolerating radiation as anticipated for the current dose and fraction. Flomax prescribed week 2 and will continue. Encouraged about feeling run down week 7 but will make no changes. We will continue on as planned with another treatment visit anticipated next week.         Juli Miranda MD   May 22, 2017

## 2017-05-23 ENCOUNTER — HOSPITAL ENCOUNTER (OUTPATIENT)
Dept: RADIATION ONCOLOGY | Age: 72
Discharge: HOME | End: 2017-05-23
Payer: MEDICARE

## 2017-05-23 PROCEDURE — 77385 HC IMRT TRMT DLVR SMPL: CPT

## 2017-05-24 ENCOUNTER — HOSPITAL ENCOUNTER (OUTPATIENT)
Dept: RADIATION ONCOLOGY | Age: 72
Discharge: HOME | End: 2017-05-24
Payer: MEDICARE

## 2017-05-24 PROCEDURE — 77336 RADIATION PHYSICS CONSULT: CPT

## 2017-05-24 PROCEDURE — 77385 HC IMRT TRMT DLVR SMPL: CPT

## 2017-05-25 ENCOUNTER — HOSPITAL ENCOUNTER (OUTPATIENT)
Dept: RADIATION ONCOLOGY | Age: 72
Discharge: HOME | End: 2017-05-25
Payer: MEDICARE

## 2017-05-25 PROCEDURE — 77385 HC IMRT TRMT DLVR SMPL: CPT

## 2017-05-26 ENCOUNTER — HOSPITAL ENCOUNTER (OUTPATIENT)
Dept: RADIATION ONCOLOGY | Age: 72
Discharge: HOME | End: 2017-05-26
Payer: MEDICARE

## 2017-05-26 DIAGNOSIS — C61 PROSTATE CANCER (HCC): Primary | ICD-10-CM

## 2017-05-26 PROCEDURE — 77385 HC IMRT TRMT DLVR SMPL: CPT

## 2017-05-26 NOTE — PROGRESS NOTES
Patient: Gera Clarke MRN: [id number]  SSN: [id number]    YOB: 1945  Age: 73 y.o. Sex: male      DIAGNOSIS: High risk prostate cancer, GS 4+5=9. 5/12 cores. PSA 11.9     PREVIOUS TREATMENT:  1) Watchful waiting after biopsy 2015  2) Repeat biopsy   3) 10/13/16 and again in January 2017: Lupron 22.5 mg  4) 03/01/17: [facility] and Fudicials    TREATMENT SITE:  Prostate and pelvic lymph nodes    DOSE and FRACTIONATION:  25/25 fractions, 4500 cGy of 5000 cGy planned, 17/19 boost, 3060 cGy of 3420 cGy planned. INTERVAL HISTORY:  Gera Clarke is a 73 y.o. male being treated for prostate cancer. He is doing well but week 7 feeling run down. Week 8 with continued mild fatigue. :  Reports Nocturia x 5 and fatigue week 2. Started flomax week 2 and doing better week 3, nocturia x 2. Again continued to be doing better week 4. Mild worsening although not bothersome through week 8. GI:  No issues through week 4. Looser stools week 5. No major change week 6-7. OBJECTIVE:  Appears tired. NAD   There were no vitals taken for this visit.     Lab Results   Component Value Date/Time    Sodium 142 04/18/2017 09:10 AM    Potassium 3.4 04/18/2017 09:10 AM    Chloride 105 04/18/2017 09:10 AM    CO2 31 04/18/2017 09:10 AM    Anion gap 6 04/18/2017 09:10 AM    Glucose 105 04/18/2017 09:10 AM    BUN 17 04/18/2017 09:10 AM    Creatinine 1.00 04/18/2017 09:10 AM    GFR est AA >60 04/18/2017 09:10 AM    GFR est non-AA >60 04/18/2017 09:10 AM    Calcium 9.4 04/18/2017 09:10 AM    Albumin 3.7 04/18/2017 09:10 AM    Protein, total 6.9 04/18/2017 09:10 AM    Globulin 3.2 04/18/2017 09:10 AM    A-G Ratio 1.2 04/18/2017 09:10 AM    AST (SGOT) 45 04/18/2017 09:10 AM    ALT (SGPT) 84 04/18/2017 09:10 AM     Lab Results   Component Value Date/Time    WBC 4.1 04/18/2017 09:10 AM    HGB 13.9 04/18/2017 09:10 AM    HCT 41.9 04/18/2017 09:10 AM    PLATELET 581 29/35/7524 09:10 AM       ASSESSMENT and PLAN:  Mague Gutierrez Chasidy Melissa is tolerating radiation as anticipated for the current dose and fraction. Flomax prescribed week 2 and will continue. Encouraged about feeling run down week 7 but will make no changes. We will continue on as planned with with treatment to be completed next Wednesday. Will plan for 1 month follow up.        Davide Stanley MD   May 26, 2017

## 2017-05-30 ENCOUNTER — HOSPITAL ENCOUNTER (OUTPATIENT)
Dept: RADIATION ONCOLOGY | Age: 72
Discharge: HOME | End: 2017-05-30
Payer: MEDICARE

## 2017-05-30 PROCEDURE — 77385 HC IMRT TRMT DLVR SMPL: CPT

## 2017-05-31 ENCOUNTER — HOSPITAL ENCOUNTER (OUTPATIENT)
Dept: RADIATION ONCOLOGY | Age: 72
Discharge: HOME | End: 2017-05-31
Payer: MEDICARE

## 2017-05-31 PROCEDURE — 77336 RADIATION PHYSICS CONSULT: CPT

## 2017-05-31 PROCEDURE — 77385 HC IMRT TRMT DLVR SMPL: CPT

## 2017-06-16 ENCOUNTER — TELEPHONE (OUTPATIENT)
Dept: CASE MANAGEMENT | Age: 72
End: 2017-06-16

## 2017-06-16 DIAGNOSIS — E87.6 HYPOPOTASSEMIA: Primary | ICD-10-CM

## 2017-06-16 NOTE — TELEPHONE ENCOUNTER
Pt called. States he went to Urgent care at  with c/o feeling faint. States potassium was low. He was given liquid potassium in office and is taking pill bid x 10 days. Pt is requesting level be checked with labs here on 6-22-17. Discussed with Dr. Jv Bran. Ok to check potassium with labs. Order placed and patient notified.     Re Leigh RN

## 2017-06-22 ENCOUNTER — HOSPITAL ENCOUNTER (OUTPATIENT)
Dept: RADIATION ONCOLOGY | Age: 72
Discharge: HOME | End: 2017-06-22

## 2017-06-22 ENCOUNTER — HOSPITAL ENCOUNTER (OUTPATIENT)
Dept: LAB | Age: 72
Discharge: HOME | End: 2017-06-22
Payer: MEDICARE

## 2017-06-22 DIAGNOSIS — E87.6 HYPOPOTASSEMIA: ICD-10-CM

## 2017-06-22 DIAGNOSIS — C61 PROSTATE CANCER (HCC): ICD-10-CM

## 2017-06-22 LAB
ANION GAP BLD CALC-SCNC: 8 MMOL/L (ref 7–16)
BUN SERPL-MCNC: 21 MG/DL (ref 8–23)
CALCIUM SERPL-MCNC: 9.3 MG/DL (ref 8.3–10.4)
CHLORIDE SERPL-SCNC: 105 MMOL/L (ref 98–107)
CO2 SERPL-SCNC: 28 MMOL/L (ref 21–32)
CREAT SERPL-MCNC: 1.13 MG/DL (ref 0.8–1.5)
GLUCOSE SERPL-MCNC: 110 MG/DL (ref 65–100)
POTASSIUM SERPL-SCNC: 4.1 MMOL/L (ref 3.5–5.1)
PSA SERPL-MCNC: <0.1 NG/ML
SODIUM SERPL-SCNC: 141 MMOL/L (ref 136–145)

## 2017-06-22 PROCEDURE — 84153 ASSAY OF PSA TOTAL: CPT | Performed by: RADIOLOGY

## 2017-06-22 PROCEDURE — 36415 COLL VENOUS BLD VENIPUNCTURE: CPT | Performed by: RADIOLOGY

## 2017-06-22 PROCEDURE — 80048 BASIC METABOLIC PNL TOTAL CA: CPT | Performed by: RADIOLOGY

## 2017-06-22 PROCEDURE — 84403 ASSAY OF TOTAL TESTOSTERONE: CPT | Performed by: RADIOLOGY

## 2017-06-23 ENCOUNTER — TELEPHONE (OUTPATIENT)
Dept: CASE MANAGEMENT | Age: 72
End: 2017-06-23

## 2017-06-23 LAB
COMMENT, TESC2: ABNORMAL
TESTOST SERPL-MCNC: 13 NG/DL (ref 348–1197)

## 2017-07-05 ENCOUNTER — HOSPITAL ENCOUNTER (OUTPATIENT)
Dept: RADIATION ONCOLOGY | Age: 72
Discharge: HOME | End: 2017-07-05
Payer: MEDICARE

## 2017-07-05 VITALS
WEIGHT: 154.9 LBS | OXYGEN SATURATION: 98 % | TEMPERATURE: 97.9 F | SYSTOLIC BLOOD PRESSURE: 164 MMHG | HEART RATE: 68 BPM | DIASTOLIC BLOOD PRESSURE: 89 MMHG | BODY MASS INDEX: 22.87 KG/M2

## 2017-07-05 DIAGNOSIS — C61 PROSTATE CANCER (HCC): Primary | ICD-10-CM

## 2017-07-05 PROCEDURE — 99211 OFF/OP EST MAY X REQ PHY/QHP: CPT

## 2017-07-05 NOTE — PROGRESS NOTES
Pt here for one month f/u for prostate cancer. Aua/Epic symptom  completed. Aua scrore 10. C/o hot flashes, weakness, numbness and tingling to finger tips and lips. S/p Space OAR 3-1-17. Luron 10-3-16, 1/17, 4-19-17. F/u Dr. Eugenia Serrano 10-18-17 for Lupron. Labs 6-22-17. Potassium 4.1. Survivorship care plan given to pt. Taking Flomax.      Emily Bobby RN

## 2017-07-05 NOTE — PROGRESS NOTES
Patient: Jatinder Ortiz MRN: [id number]  SSN: [id number]    YOB: 1945  Age: 73 y.o. Sex: male      Other Providers:  Liset Nuñez MD, Chance Nichols MD    CHIEF COMPLAINT: Prostate cancer    DIAGNOSIS: High risk prostate cancer, GS 4+5=9. 5/12 cores. PSA 11.9    PREVIOUS TREATMENT:  1) Watchful waiting after biopsy 2015  2) Repeat biopsy   3) 10/13/16:  Lupron 22.5 mg  4) Radiation therapy of the Prostate and pelvic lymph nodes with 25 fractions of 5000 cGy planned, 19 boost of 3420     cGy planned. Treatment dates: 03/29 - 05/31/17    HISTORY OF PRESENT ILLNESS:  Jatinder Ortiz is a 73 y.o. male seen by Dr. Celio Syed 01/27/17 at the request of Dr. Zander Stallworth. He is a patient of Dr. Crystal Sifuentes, previously followed by Dr. Dario Edwards at [facility]. He originally had a diagnosis of prostate cancer in 2015 when a biopsy showed [place] 6 disease in 1 core, 45%, with a PSA of 4.7, volume 21 cc. He elected for watchful waiting at that time. PSA at initial biopsy 4.7  Feb 2016 6.4  May 2016 5.9  Sept 2016 11.9    In 2016, he transferred care to Dr. Crystal Sifuentes and PSA devin to 7.34, then to 11.9, prompting repeat biopsy of the prostate that then showed [place] 4+5 disease. Biopsy showed GS 4+5=9 in 3 cores between 35-45%, 1 core GS 4+4=8 in 35%, and 1 core 4+3=7 in 35% for a total of 5/12 cores. He was also having some right hip pain and pelvic discomfort which prompted systemic restaging. Bone scan revealed some small suspicious uptake in the sternum and in the left proximal femur, with corresponding findings on CT, along with an asymmetrically enlarged right pelvic sidewall internal iliac lymph node measuring 17 mm. These findings were felt to most likely represent metastatic disease. Dr. Crystal Sifuentes started him on Lupron with a 22.5 mg dose given on 10/3/16. He was referred to medical oncology for evaluation and met with Dr. Zander Stallworth.  He recommended short term observation with repeat CT and bone scan after 3 months to determine whether the lesions responded to ADT, which would suggest stage IV disease. CT showed resolution of pelvic adenopathy but bone lesions were stable. PSA was 0.6 consistent with excellent biochemical response but he continued to have pain in the left hip still. Dr. Arun Bernal was entertaining the possibilty that the bone lesions were not metastases. He chose to obtain MRI of the left hip, and if consistent with a nonneoplastic etiology which was seen 1/12/17, he recommend rad onc referral for consideration of local therapy which is the rationale for our visit today. He is having hot flashes and failed Effexor with changes in his blood pressure. He is able to play golf regularly and would prefer to avoid treatment if possible. INTERVAL HISTORY: Mr. Kamron Schuler returns today for follow up 1 month after completing radiation therapy for his high risk prostate cancer. He began long term ADT, 10/13/16 (3 month dosing) under the management of Dr. Michael Blevins. He received his most recent injection 04/19/17 under the management of Dr. Arun Bernal, Medical Oncologist. He tolerated his radiation therapy well. He has an AUA score of 10/35 and is \"mostly satisfied\" with his urinary function. He has occasional urinary frequency (denpending on his fluid intake) and urgency, otherwise denies incomplete emptying, intermittency or straining. He has a strong urinary flow. He has nocturia x2. He was started on Flomax towards the end of completing radiation therapy and believes his urinary function has improved. He is having normal bowel movements and denies melanic or bloody stools. His chief complaint is fatigue, hot flashes and generalized weakness. He reports some days are \"pretty bad\". GENITOURINARY ROS:  AUA 6. No major issues. He is able to get an erection.      01 month AUA     PAST MEDICAL HISTORY:    Past Medical History:   Diagnosis Date    Anxiety     Arthritis     Elevated prostate specific antigen (PSA) 1/15/2014    Former cigar smoker     GERD (gastroesophageal reflux disease)     Hypertension     Hypertrophy of prostate with urinary obstruction and other lower urinary tract symptoms (LUTS) 1/15/2014    Prostate cancer [facility])        The patient denies history of collagen vascular diseases, pacemaker insertion, prior radiation or prior chemotherapy. PAST SURGICAL HISTORY:   Past Surgical History:   Procedure Laterality Date    HX COLONOSCOPY      HX MALIGNANT SKIN LESION EXCISION      squamous cell - 2-3 times    HX TONSILLECTOMY  as child    GA PROSTATE BIOPSY, NEEDLE, SATURATION SAMPLING         MEDICATIONS:     Current Outpatient Prescriptions:     cloNIDine (CATAPRES) 0.1 mg/24 hr patch, 1 Patch by TransDERmal route., Disp: , Rfl:     tamsulosin (FLOMAX) 0.4 mg capsule, Take 1 Cap by mouth daily. , Disp: 90 Cap, Rfl: 1    LORazepam (ATIVAN) 1 mg tablet, Take 1 Tab by mouth every six (6) hours as needed for Anxiety. Max Daily Amount: 4 mg. (Patient taking differently: Take 1 mg by mouth every six (6) hours as needed for Anxiety. Indications: take on the dos if needed), Disp: 90 Tab, Rfl: 3    hydroCHLOROthiazide (HYDRODIURIL) 25 mg tablet, Take 12.5 mg by mouth every morning., Disp: , Rfl:     carvedilol (COREG) 6.25 mg tablet, Take 6.25 mg by mouth two (2) times daily (with meals). Indications: take on the dos, Disp: , Rfl:     aspirin delayed-release 81 mg tablet, Take  by mouth every morning. Indications: held for 5 days, Disp: , Rfl:     zolpidem (AMBIEN) 5 mg tablet, Take  by mouth nightly as needed for Sleep., Disp: , Rfl:     ranitidine (ZANTAC) 150 mg tablet, Take 150 mg by mouth nightly., Disp: , Rfl: 1    GLUC ALLEN/CHONDRO ALLEN A/VIT C/MN (GLUCOSAMINE 1500 COMPLEX PO), Take 1 Tab by mouth every morning. Indications: hold as of today, Disp: , Rfl:     MULTIVITAMIN WITH MINERALS (MULTIPLE VITAMIN-MINERALS PO), Take 0.5 Tabs by mouth every morning.  Indications: hold until after surgery, Disp: , Rfl: ALLERGIES:   No Known Allergies    SOCIAL HISTORY:   Social History     Social History    Marital status:      Spouse name: N/A    Number of children: N/A    Years of education: N/A     Occupational History    Not on file. Social History Main Topics    Smoking status: Former Smoker     Types: Cigars    Smokeless tobacco: Never Used    Alcohol use 3.6 - 4.8 oz/week     0 Standard drinks or equivalent, 6 - 8 Cans of beer per week    Drug use: No    Sexual activity: Not on file     Other Topics Concern    Not on file     Social History Narrative       FAMILY HISTORY:   Family History   Problem Relation Age of Onset    Heart Disease Father      pacemaker in his late [de-identified]       REVIEW OF SYSTEMS: Please see the completed review of systems sheet in the chart that I have reviewed today. PHYSICAL EXAMINATION:   ECOG Performance status 1  VITAL SIGNS:   Visit Vitals    /89 (BP 1 Location: Left arm, BP Patient Position: Sitting)    Pulse 68    Temp 97.9 °F (36.6 °C)    Wt 154 lb 14.4 oz (70.3 kg)    SpO2 98%    BMI 22.87 kg/m2        GENERAL: The patient is well-developed, ambulatory, alert and in no acute distress. HEENT: Head is normocephalic, atraumatic. PATHOLOGY:    9/19/16:      DIAGNOSIS   A: \"PROSTATE, LEFT LATERAL BASE, BIOPSY\": PROSTATIC TISSUE WITH CHRONIC INFLAMMATION. B: \"PROSTATE, LEFT LATERAL MID, BIOPSY\": PROSTATIC TISSUE WITH CHRONIC INFLAMMATION. C: \"PROSTATE, LEFT LATERAL APEX, BIOPSY\": PROSTATIC TISSUE WITH FOCAL ACUTE AND CHRONIC INFLAMMATION. D: \"PROSTATE, LEFT BASE, BIOPSY\": PROSTATIC TISSUE WITH CHRONIC INFLAMMATION. E: \"PROSTATE, LEFT MID, BIOPSY\": PROSTATITIC TISSUE WITH FOCAL ACUTE AND CHRONIC INFLAMMATION. F: \"PROSTATE, LEFT APEX, BIOPSY\": PROSTATIC TISSUE, NO CARCINOMA IDENTIFIED. G: \"PROSTATE, RIGHT BASE, BIOPSY\": PROSTATIC ADENOCARCINOMA, DEBBIE SCORE   4 + 5 = 9, INVOLVING 40% OF BIOPSY. PERINEURAL INVASION PRESENT.    H: \"PROSTATE, RIGHT MID, BIOPSY\": PROSTATIC ADENOCARCINOMA, DEBBIE SCORE   4 + 4 = 8, INVOLVING 35% OF BIOPSY. I: \"PROSTATE, RIGHT APEX, BIOPSY\": PROSTATIC TISSUE WITH A SMALL FOCUS OF ATYPICAL GLANDS SUSPICIOUS FOR CARCINOMA. J: \"PROSTATE, RIGHT LATERAL BASE, BIOPSY\": PROSTATIC ADENOCARCINOMA, DEBBIE SCORE 4 + 5 = 9, INVOLVING 45% OF BIOPSY. SUSPICIOUS FOR PERINEURAL INVASION. K: \"PROSTATE, RIGHT LATERAL MID, BIOPSY\": PROSTATIC ADENOCARCINOMA, DEBBIE SCORE 4 + 5 = 9, INVOLVING 45% OF BIOPSY. L: \"PROSTATE, RIGHT LATERAL APEX, BIOPSY\": PROSTATIC ADENOCARCINOMA, DEBBIE SCORE 4 + 3 = 7, INVOLVING 35% OF BIOPSY. LABORATORY:   06/22/17: PSA <0.1 with testosterone of 13      RADIOLOGY:    Nm Bone Scan Wh Body    Result Date: 12/27/2016  Whole-body bone scan INDICATION:  Restaging prostate cancer Whole-body images were obtained after intravenous injection of 26.4 mCi of technetium HDP. COMPARISON:  09/29/2016 FINDINGS:  There is stable focal uptake in the intertrochanteric left hip. Uptake in the midsternum is also stable. There are no developing lesions in the ribs or spine. IMPRESSION:  1.  Stable uptake in the left hip, concerning for metastatic disease. CT appearance is nonspecific, this could also represent a benign chondroid lesion. 2.  Stable uptake in the midsternum. This is also indeterminate, benign uptake can be seen in this region. Mri Hip Rafat Espinosa Wo Cont    Result Date: 1/12/2017  History: Severe left hip pain. History of prostate cancer diagnosed one year ago. EXAM: MRI left hip with and without contrast TECHNIQUE: Multiplanar multisequence imaging is performed both before and after the uneventful ministration of 7 cc MultiHance. COMPARISON: Bone scan dated 12/27/2016 FINDINGS: The abnormal focus of uptake on the recent bone scan corresponds to a lobular area of increased T2 signal within the posterior aspect of the intertrochanteric portion of the left femur.  This lesion most likely represents an enchondroma. No additional focal marrow lesions demonstrated. Degenerative disc signal noted in the low lumbar spine and pubic symphysis. There is normal signal at the common hamstring tendon origin and at the gluteal muscular insertion. There is a tear of the anterior superior acetabular labrum. No abnormal fluid collection or soft tissue mass. IMPRESSION: 1. The abnormality seen on the bone scan corresponds to an enchondroma within the intertrochanteric portion of the left femur posteriorly. 2. Tear of the anterior superior acetabular labrum on the left. 3. Degenerative changes of the low lumbar spine and pubic symphysis. 4. No osseous metastases seen within the left hip or pelvis. Ct Pelv W Cont    Addendum Date: 12/27/2016    Addendum: Addendum: The sclerotic, intertrochanteric left hip lesion is stable. CT appearance is nonspecific. While this could represent a bony metastatic lesion, could also represent in benign chondroid lesion. Result Date: 12/27/2016  CT of the pelvis INDICATION:  Prostate cancer Multiple axial images were obtained through the pelvis after intravenous injection of 100mL of Isovue 370. Radiation dose reduction techniques were used for this study: All CT scans performed at this facility use one or all of the following: Automated exposure control, adjustment of the mA and/or kVp according to patient's size, iterative reconstruction. Compared with 09/29/2016. FINDINGS: The right pelvic sidewall lymph node described on the prior exam has completely resorbed. No residual mass or adenopathy is seen in the pelvis. The prostate is stable in size and appearance. No discrete mass is seen. There are no inflammatory changes or fluid collections in the pelvis. There are no significant bony lesions. IMPRESSION: Interval resolution of right pelvic adenopathy     IMPRESSION:  Niharika Olivia is a 73 y.o. male with initially felt to be metastatic prostate cancer.  In September 2016, the bone scan showed 2 suspicious bone lesions and a single 1.7 cm iliac node, suspicious for metastasis. Repeat CT showed stable bone lesion and a responding PSA at 0.6 (PSA was 11.9 prior to Lupron injection given on 10/3/16). An MRI of the left hip was then obtained for his complaint of hip pain that did not show bone metastases. With a stable CT bone lesion and responding PSA, it is believed that the lesion noted on his previous bone scan representing metastases is low. He completed radiation therapy of the prostate bed and lymph nodes on 05/31/17. He is recovering as anticipated from radiation therapy. He continues on long term ADT under the management of Dr. Veronique Stewart, Medical Oncologist. His chief complaints appears to be related to the side effect of ADT. We discussed that if receiving ADT impairs his quality of life, that he has most likely benefited from the injections of Lupron and stopping ADT would only decrease our ability to control his cancer by 10-15%. He is not scheduled for his next injection until 10/18 and at that time he will let us know what he would like to do. Case discussed with Dr. Augustina Nelson:    1) Follow up 3 months with PSA/Testosterone prior to visit  2) Follow up with Dr. Veronique Stewart as scheduled  3) Continue Justo Chapin NP   July 5, 2017       Portions of this note were copied from prior encounters and reviewed for accuracy, currency, and represent documentation and tasks completed during this encounter. I verify and attest these portions to be unchanged from prior visits.

## 2017-09-15 ENCOUNTER — HOSPITAL ENCOUNTER (OUTPATIENT)
Dept: LAB | Age: 72
Discharge: HOME | End: 2017-09-15
Payer: MEDICARE

## 2017-09-15 DIAGNOSIS — C61 PROSTATE CANCER (HCC): ICD-10-CM

## 2017-09-15 LAB
ALBUMIN SERPL-MCNC: 3.7 G/DL (ref 3.2–4.6)
ALBUMIN/GLOB SERPL: 1.1 {RATIO} (ref 1.2–3.5)
ALP SERPL-CCNC: 67 U/L (ref 50–136)
ALT SERPL-CCNC: 32 U/L (ref 12–65)
ANION GAP SERPL CALC-SCNC: 4 MMOL/L (ref 7–16)
AST SERPL-CCNC: 21 U/L (ref 15–37)
BASOPHILS # BLD: 0 K/UL (ref 0–0.2)
BASOPHILS NFR BLD: 1 % (ref 0–2)
BILIRUB SERPL-MCNC: 1.2 MG/DL (ref 0.2–1.1)
BUN SERPL-MCNC: 19 MG/DL (ref 8–23)
CALCIUM SERPL-MCNC: 9.4 MG/DL (ref 8.3–10.4)
CHLORIDE SERPL-SCNC: 107 MMOL/L (ref 98–107)
CO2 SERPL-SCNC: 31 MMOL/L (ref 21–32)
CREAT SERPL-MCNC: 1.18 MG/DL (ref 0.8–1.5)
DIFFERENTIAL METHOD BLD: ABNORMAL
EOSINOPHIL # BLD: 0.2 K/UL (ref 0–0.8)
EOSINOPHIL NFR BLD: 5 % (ref 0.5–7.8)
ERYTHROCYTE [DISTWIDTH] IN BLOOD BY AUTOMATED COUNT: 12.7 % (ref 11.9–14.6)
GLOBULIN SER CALC-MCNC: 3.5 G/DL (ref 2.3–3.5)
GLUCOSE SERPL-MCNC: 119 MG/DL (ref 65–100)
HCT VFR BLD AUTO: 41.8 % (ref 41.1–50.3)
HGB BLD-MCNC: 14 G/DL (ref 13.6–17.2)
LYMPHOCYTES # BLD: 1.7 K/UL (ref 0.5–4.6)
LYMPHOCYTES NFR BLD: 32 % (ref 13–44)
MCH RBC QN AUTO: 30.4 PG (ref 26.1–32.9)
MCHC RBC AUTO-ENTMCNC: 33.5 G/DL (ref 31.4–35)
MCV RBC AUTO: 90.9 FL (ref 79.6–97.8)
MONOCYTES # BLD: 0.4 K/UL (ref 0.1–1.3)
MONOCYTES NFR BLD: 7 % (ref 4–12)
NEUTS SEG # BLD: 3 K/UL (ref 1.7–8.2)
NEUTS SEG NFR BLD: 56 % (ref 43–78)
NRBC # BLD: 0 K/UL (ref 0–0.2)
PLATELET # BLD AUTO: 250 K/UL (ref 150–450)
PMV BLD AUTO: 9.1 FL (ref 10.8–14.1)
POTASSIUM SERPL-SCNC: 3.8 MMOL/L (ref 3.5–5.1)
PROT SERPL-MCNC: 7.2 G/DL (ref 6.3–8.2)
PSA SERPL-MCNC: <0.1 NG/ML
RBC # BLD AUTO: 4.6 M/UL (ref 4.23–5.67)
SODIUM SERPL-SCNC: 142 MMOL/L (ref 136–145)
WBC # BLD AUTO: 5.3 K/UL (ref 4.3–11.1)

## 2017-09-15 PROCEDURE — 80053 COMPREHEN METABOLIC PANEL: CPT | Performed by: INTERNAL MEDICINE

## 2017-09-15 PROCEDURE — 85025 COMPLETE CBC W/AUTO DIFF WBC: CPT | Performed by: INTERNAL MEDICINE

## 2017-09-15 PROCEDURE — 36415 COLL VENOUS BLD VENIPUNCTURE: CPT | Performed by: INTERNAL MEDICINE

## 2017-09-15 PROCEDURE — 84153 ASSAY OF PSA TOTAL: CPT | Performed by: INTERNAL MEDICINE

## 2017-10-13 ENCOUNTER — HOSPITAL ENCOUNTER (OUTPATIENT)
Dept: INFUSION THERAPY | Age: 72
End: 2017-10-13

## 2017-10-26 ENCOUNTER — HOSPITAL ENCOUNTER (OUTPATIENT)
Dept: RADIATION ONCOLOGY | Age: 72
Discharge: HOME | End: 2017-10-26
Payer: MEDICARE

## 2017-10-26 ENCOUNTER — HOSPITAL ENCOUNTER (OUTPATIENT)
Dept: INFUSION THERAPY | Age: 72
Discharge: HOME | End: 2017-10-26
Payer: MEDICARE

## 2017-10-26 VITALS
HEART RATE: 68 BPM | SYSTOLIC BLOOD PRESSURE: 139 MMHG | TEMPERATURE: 97.4 F | RESPIRATION RATE: 18 BRPM | DIASTOLIC BLOOD PRESSURE: 95 MMHG | OXYGEN SATURATION: 99 %

## 2017-10-26 VITALS
TEMPERATURE: 97.9 F | DIASTOLIC BLOOD PRESSURE: 95 MMHG | HEART RATE: 59 BPM | BODY MASS INDEX: 23.26 KG/M2 | SYSTOLIC BLOOD PRESSURE: 158 MMHG | WEIGHT: 157.5 LBS | OXYGEN SATURATION: 98 %

## 2017-10-26 DIAGNOSIS — C61 PROSTATE CANCER (HCC): Primary | ICD-10-CM

## 2017-10-26 DIAGNOSIS — C61 PROSTATE CANCER (HCC): ICD-10-CM

## 2017-10-26 PROCEDURE — 74011250636 HC RX REV CODE- 250/636: Performed by: NURSE PRACTITIONER

## 2017-10-26 PROCEDURE — 99211 OFF/OP EST MAY X REQ PHY/QHP: CPT

## 2017-10-26 PROCEDURE — 96402 CHEMO HORMON ANTINEOPL SQ/IM: CPT

## 2017-10-26 RX ADMIN — LEUPROLIDE ACETATE 22.5 MG: KIT at 08:39

## 2017-10-26 NOTE — PROGRESS NOTES
Patient: Ever Pérez MRN: [id number]  SSN: [id number]    YOB: 1945  Age: 81 y.o. Sex: male      Other Providers:  Ann Scruggs MD, Jarvis Santiago MD    CHIEF COMPLAINT: Prostate cancer    DIAGNOSIS: High risk prostate cancer, GS 4+5=9. 5/12 cores. PSA 11.9    PREVIOUS TREATMENT:  1) Watchful waiting after biopsy 2015  2) Repeat biopsy   3) 10/13/16:  Lupron 22.5 mg  4) Radiation therapy of the Prostate and pelvic lymph nodes with 25 fractions of 5000 cGy planned, 19 boost of 3420 cGy planned. Treatment dates: 03/29 - 05/31/17    HISTORY OF PRESENT ILLNESS:  Ever Pérez is a 81 y.o. male initially seen by Dr. Karsten Hester 01/27/17 at the request of Dr. Aneta Hampton. He is a patient of Dr. Mary Bean, previously followed by Dr. Sukh Duque at [facility]. He originally had a diagnosis of prostate cancer in 2015 when a biopsy showed [place] disease in 1 core, 45%, with a PSA of 4.7, volume 21 cc. He elected for watchful waiting at that time. PSA at initial biopsy 4.7  Feb 2016 6.4  May 2016 5.9  Sept 2016 11.9    In 2016, he transferred care to Dr. Mary Bean and PSA devin to 7.34, then to 11.9, prompting repeat biopsy of the prostate that then showed [place] 4+5 disease. Biopsy showed GS 4+5=9 in 3 cores between 35-45%, 1 core GS 4+4=8 in 35%, and 1 core 4+3=7 in 35% for a total of 5/12 cores. He was also having some right hip pain and pelvic discomfort which prompted systemic restaging. Bone scan revealed some small suspicious uptake in the sternum and in the left proximal femur, with corresponding findings on CT, along with an asymmetrically enlarged right pelvic sidewall internal iliac lymph node measuring 17 mm. These findings were felt to most likely represent metastatic disease. Dr. Mary Bean started him on Lupron with a 22.5 mg dose given on 10/3/16. He was referred to medical oncology for evaluation and met with Dr. Aneta Hampton.  He recommended short term observation with repeat CT and bone scan after 3 months to determine whether the lesions responded to ADT, which would suggest stage IV disease. CT showed resolution of pelvic adenopathy but bone lesions were stable. PSA was 0.6 consistent with excellent biochemical response but he continued to have pain in the left hip still. Dr. Luis Knight was entertaining the possibilty that the bone lesions were not metastases. He chose to obtain MRI of the left hip, and if consistent with a nonneoplastic etiology which was seen 1/12/17, he recommend rad onc referral for consideration of local therapy which is the rationale for our visit today. He is having hot flashes and failed Effexor with changes in his blood pressure. He is able to play golf regularly and would prefer to avoid treatment if possible. INTERVAL HISTORY: Mr. Lorna Pickens returns today for follow up 5 months after completing radiation therapy for his high risk prostate cancer. He began long term ADT, 10/13/16 (3 month dosing) under the management of Dr. Edinson Drew. At his previous visit he expressed his concern on continuing ADT due to the side effects he was experiencing. Today he tells me that he decided to try a 3 month injection (recieved today) and if better tolerated he will continue, otherwise will stop ADT. He tolerated radiation therapy well. He has an AUA score of 17/35 and is \"mostly satisfied\" with his urinary function. He has occasional urinary frequency (depending on fluid intake) and urgency, otherwise denies incomplete emptying, intermittency or straining. His stream is weak more than half the time. He has nocturia x2-4. He was started on Flomax towards the end of completing radiation therapy. He is having normal bowel movements and denies melanic or bloody stools. His chief complaint is fatigue, hot flashes and generalized weakness, although doing \"pretty good\" today. He was started on Celexa and feels his hot flashes are not as intense of often. GENITOURINARY ROS:  AUA 6. No major issues.   He is able to get an erection. 01 month AUA 10  05 month AUA 17    PAST MEDICAL HISTORY:    Past Medical History:   Diagnosis Date    Anxiety     Arthritis     Elevated prostate specific antigen (PSA) 1/15/2014    Former cigar smoker     GERD (gastroesophageal reflux disease)     Hypertension     Hypertrophy of prostate with urinary obstruction and other lower urinary tract symptoms (LUTS) 1/15/2014    Prostate cancer [facility])        The patient denies history of collagen vascular diseases, pacemaker insertion, prior radiation or prior chemotherapy. PAST SURGICAL HISTORY:   Past Surgical History:   Procedure Laterality Date    HX COLONOSCOPY      HX MALIGNANT SKIN LESION EXCISION      squamous cell - 2-3 times    HX TONSILLECTOMY  as child    [place] PROSTATE BIOPSY, NEEDLE, SATURATION SAMPLING         MEDICATIONS:     Current Outpatient Prescriptions:     citalopram (CELEXA) 20 mg tablet, Take 1 Tab by mouth daily. , Disp: 90 Tab, Rfl: 1    tamsulosin (FLOMAX) 0.4 mg capsule, Take 1 Cap by mouth daily. , Disp: 90 Cap, Rfl: 1    cloNIDine (CATAPRES) 0.1 mg/24 hr patch, 1 Patch by TransDERmal route., Disp: , Rfl:     LORazepam (ATIVAN) 1 mg tablet, Take 1 Tab by mouth every six (6) hours as needed for Anxiety. Max Daily Amount: 4 mg. (Patient taking differently: Take 1 mg by mouth every six (6) hours as needed for Anxiety. Indications: take on the dos if needed), Disp: 90 Tab, Rfl: 3    aspirin delayed-release 81 mg tablet, Take  by mouth every morning. Indications: held for 5 days, Disp: , Rfl:     zolpidem (AMBIEN) 5 mg tablet, Take  by mouth nightly as needed for Sleep., Disp: , Rfl:     ranitidine (ZANTAC) 150 mg tablet, Take 150 mg by mouth nightly., Disp: , Rfl: 1    GLUC ALLEN/CHONDRO ALLEN A/VIT C/MN (GLUCOSAMINE 1500 COMPLEX PO), Take 1 Tab by mouth every morning. Indications: hold as of today, Disp: , Rfl:     MULTIVITAMIN WITH MINERALS (MULTIPLE VITAMIN-MINERALS PO), Take 0.5 Tabs by mouth every morning. Indications: hold until after surgery, Disp: , Rfl:   No current facility-administered medications for this encounter. ALLERGIES:   No Known Allergies    SOCIAL HISTORY:   Social History     Social History    Marital status:      Spouse name: N/A    Number of children: N/A    Years of education: N/A     Occupational History    Not on file. Social History Main Topics    Smoking status: Former Smoker     Types: Cigars    Smokeless tobacco: Never Used    Alcohol use 3.6 - 4.8 oz/week     0 Standard drinks or equivalent, 6 - 8 Cans of beer per week    Drug use: No    Sexual activity: Not on file     Other Topics Concern    Not on file     Social History Narrative       FAMILY HISTORY:   Family History   Problem Relation Age of Onset    Heart Disease Father      pacemaker in his late [de-identified]       REVIEW OF SYSTEMS: Please see the completed review of systems sheet in the chart that I have reviewed today. PHYSICAL EXAMINATION:   ECOG Performance status 1  VITAL SIGNS:   Visit Vitals    BP (!) 158/95 (BP Patient Position: Sitting)    Pulse (!) 59    Temp 97.9 °F (36.6 °C)    Wt 157 lb 8 oz (71.4 kg)    SpO2 98%    BMI 23.26 kg/m2        GENERAL: The patient is well-developed, ambulatory, alert and in no acute distress. PATHOLOGY:    9/19/16:      DIAGNOSIS   A: \"PROSTATE, LEFT LATERAL BASE, BIOPSY\": PROSTATIC TISSUE WITH CHRONIC INFLAMMATION. B: \"PROSTATE, LEFT LATERAL MID, BIOPSY\": PROSTATIC TISSUE WITH CHRONIC INFLAMMATION. C: \"PROSTATE, LEFT LATERAL APEX, BIOPSY\": PROSTATIC TISSUE WITH FOCAL ACUTE AND CHRONIC INFLAMMATION. D: \"PROSTATE, LEFT BASE, BIOPSY\": PROSTATIC TISSUE WITH CHRONIC INFLAMMATION. E: \"PROSTATE, LEFT MID, BIOPSY\": PROSTATITIC TISSUE WITH FOCAL ACUTE AND CHRONIC INFLAMMATION. F: \"PROSTATE, LEFT APEX, BIOPSY\": PROSTATIC TISSUE, NO CARCINOMA IDENTIFIED.    G: \"PROSTATE, RIGHT BASE, BIOPSY\": PROSTATIC ADENOCARCINOMA, DEBBIE SCORE   4 + 5 = 9, INVOLVING 40% OF BIOPSY. PERINEURAL INVASION PRESENT. H: \"PROSTATE, RIGHT MID, BIOPSY\": PROSTATIC ADENOCARCINOMA, DEBBIE SCORE   4 + 4 = 8, INVOLVING 35% OF BIOPSY. I: \"PROSTATE, RIGHT APEX, BIOPSY\": PROSTATIC TISSUE WITH A SMALL FOCUS OF ATYPICAL GLANDS SUSPICIOUS FOR CARCINOMA. J: \"PROSTATE, RIGHT LATERAL BASE, BIOPSY\": PROSTATIC ADENOCARCINOMA, DEBBIE SCORE 4 + 5 = 9, INVOLVING 45% OF BIOPSY. SUSPICIOUS FOR PERINEURAL INVASION. K: \"PROSTATE, RIGHT LATERAL MID, BIOPSY\": PROSTATIC ADENOCARCINOMA, DEBBIE SCORE 4 + 5 = 9, INVOLVING 45% OF BIOPSY. L: \"PROSTATE, RIGHT LATERAL APEX, BIOPSY\": PROSTATIC ADENOCARCINOMA, DEBBIE SCORE 4 + 3 = 7, INVOLVING 35% OF BIOPSY. LABORATORY:   09/15/17: PSA <0.1   06/22/17: PSA <0.1 with testosterone of 13    RADIOLOGY:    Nm Bone Scan Wh Body    Result Date: 12/27/2016  Whole-body bone scan INDICATION:  Restaging prostate cancer Whole-body images were obtained after intravenous injection of 26.4 mCi of technetium HDP. COMPARISON:  09/29/2016 FINDINGS:  There is stable focal uptake in the intertrochanteric left hip. Uptake in the midsternum is also stable. There are no developing lesions in the ribs or spine. IMPRESSION:  1.  Stable uptake in the left hip, concerning for metastatic disease. CT appearance is nonspecific, this could also represent a benign chondroid lesion. 2.  Stable uptake in the midsternum. This is also indeterminate, benign uptake can be seen in this region. Mri Hip Nancy First Wo Cont    Result Date: 1/12/2017  History: Severe left hip pain. History of prostate cancer diagnosed one year ago. EXAM: MRI left hip with and without contrast TECHNIQUE: Multiplanar multisequence imaging is performed both before and after the uneventful ministration of 7 cc MultiHance.  COMPARISON: Bone scan dated 12/27/2016 FINDINGS: The abnormal focus of uptake on the recent bone scan corresponds to a lobular area of increased T2 signal within the posterior aspect of the intertrochanteric portion of the left femur. This lesion most likely represents an enchondroma. No additional focal marrow lesions demonstrated. Degenerative disc signal noted in the low lumbar spine and pubic symphysis. There is normal signal at the common hamstring tendon origin and at the gluteal muscular insertion. There is a tear of the anterior superior acetabular labrum. No abnormal fluid collection or soft tissue mass. IMPRESSION: 1. The abnormality seen on the bone scan corresponds to an enchondroma within the intertrochanteric portion of the left femur posteriorly. 2. Tear of the anterior superior acetabular labrum on the left. 3. Degenerative changes of the low lumbar spine and pubic symphysis. 4. No osseous metastases seen within the left hip or pelvis. Ct Pelv W Cont    Addendum Date: 12/27/2016    Addendum: Addendum: The sclerotic, intertrochanteric left hip lesion is stable. CT appearance is nonspecific. While this could represent a bony metastatic lesion, could also represent in benign chondroid lesion. Result Date: 12/27/2016  CT of the pelvis INDICATION:  Prostate cancer Multiple axial images were obtained through the pelvis after intravenous injection of 100mL of Isovue 370. Radiation dose reduction techniques were used for this study: All CT scans performed at this facility use one or all of the following: Automated exposure control, adjustment of the mA and/or kVp according to patient's size, iterative reconstruction. Compared with 09/29/2016. FINDINGS: The right pelvic sidewall lymph node described on the prior exam has completely resorbed. No residual mass or adenopathy is seen in the pelvis. The prostate is stable in size and appearance. No discrete mass is seen. There are no inflammatory changes or fluid collections in the pelvis. There are no significant bony lesions.      IMPRESSION: Interval resolution of right pelvic adenopathy       IMPRESSION:  Harvinder Stallworth is a 81 y.o. male with initially felt to be metastatic prostate cancer. In September 2016, the bone scan showed 2 suspicious bone lesions and a single 1.7 cm iliac node, suspicious for metastasis. Repeat CT showed stable bone lesion and a responding PSA at 0.6 (PSA was 11.9 prior to Lupron injection given on 10/3/16). An MRI of the left hip was then obtained for his complaint of hip pain that did not show bone metastases. With a stable CT bone lesion and responding PSA, it is believed that the lesion noted on his previous bone scan representing metastases is low. He completed radiation therapy of the prostate bed and lymph nodes on 05/31/17. He is recovering as anticipated from radiation therapy. He continues on long term ADT under the management of Dr. Sandy Figueroa, Medical Oncologist. At his previous visit he expressed his concern on continuing ADT due to the side effects he was experiencing. He tells me that he decided to try a 3 month injection (recieved today) and if better tolerated he will continue, otherwise will stop ADT. PLAN:    1) Follow up 3 months with PSA/Testosterone prior to visit  2) Follow up with Dr. Sandy Figueroa as scheduled  3) Continue Flomax  4) ADT under the direction of Dr. Sandy Figueroa - scheduled 3 month injection in January      Carmelina Stark NP   October 26, 2017       Portions of this note were copied from prior encounters and reviewed for accuracy, currency, and represent documentation and tasks completed during this encounter. I verify and attest these portions to be unchanged from prior visits.

## 2017-10-26 NOTE — PROGRESS NOTES
3 month f/u prostate cancer. Aua/Epic symptom  completed. Aua score 17. Rt end 5-31-17. Labs 9-15-17. Taking Flomax. Next lupron injection today.     Damari Yoo RN

## 2017-10-26 NOTE — PROGRESS NOTES
Arrived to the [facility]. 3 month Yeniron completed. Patient tolerated well.    Any issues or concerns during appointment: no.  Discharged ambulatory

## 2018-01-19 ENCOUNTER — HOSPITAL ENCOUNTER (OUTPATIENT)
Dept: RADIATION ONCOLOGY | Age: 73
Discharge: HOME | End: 2018-01-19
Payer: MEDICARE

## 2018-01-19 ENCOUNTER — HOSPITAL ENCOUNTER (OUTPATIENT)
Dept: LAB | Age: 73
Discharge: HOME | End: 2018-01-19
Payer: MEDICARE

## 2018-01-19 DIAGNOSIS — C61 PROSTATE CANCER (HCC): ICD-10-CM

## 2018-01-19 LAB
ALBUMIN SERPL-MCNC: 3.8 G/DL (ref 3.2–4.6)
ALBUMIN/GLOB SERPL: 1.1 {RATIO} (ref 1.2–3.5)
ALP SERPL-CCNC: 73 U/L (ref 50–136)
ALT SERPL-CCNC: 22 U/L (ref 12–65)
ANION GAP SERPL CALC-SCNC: 11 MMOL/L (ref 7–16)
AST SERPL-CCNC: 13 U/L (ref 15–37)
BASOPHILS # BLD: 0 K/UL (ref 0–0.2)
BASOPHILS NFR BLD: 1 % (ref 0–2)
BILIRUB SERPL-MCNC: 0.8 MG/DL (ref 0.2–1.1)
BUN SERPL-MCNC: 19 MG/DL (ref 8–23)
CALCIUM SERPL-MCNC: 9.2 MG/DL (ref 8.3–10.4)
CHLORIDE SERPL-SCNC: 107 MMOL/L (ref 98–107)
CO2 SERPL-SCNC: 27 MMOL/L (ref 21–32)
CREAT SERPL-MCNC: 1.18 MG/DL (ref 0.8–1.5)
DIFFERENTIAL METHOD BLD: ABNORMAL
EOSINOPHIL # BLD: 0.2 K/UL (ref 0–0.8)
EOSINOPHIL NFR BLD: 4 % (ref 0.5–7.8)
ERYTHROCYTE [DISTWIDTH] IN BLOOD BY AUTOMATED COUNT: 12.7 % (ref 11.9–14.6)
GLOBULIN SER CALC-MCNC: 3.5 G/DL (ref 2.3–3.5)
GLUCOSE SERPL-MCNC: 100 MG/DL (ref 65–100)
HCT VFR BLD AUTO: 41.7 % (ref 41.1–50.3)
HGB BLD-MCNC: 14.1 G/DL (ref 13.6–17.2)
LYMPHOCYTES # BLD: 1.5 K/UL (ref 0.5–4.6)
LYMPHOCYTES NFR BLD: 30 % (ref 13–44)
MCH RBC QN AUTO: 31.1 PG (ref 26.1–32.9)
MCHC RBC AUTO-ENTMCNC: 33.8 G/DL (ref 31.4–35)
MCV RBC AUTO: 92.1 FL (ref 79.6–97.8)
MONOCYTES # BLD: 0.4 K/UL (ref 0.1–1.3)
MONOCYTES NFR BLD: 8 % (ref 4–12)
NEUTS SEG # BLD: 2.9 K/UL (ref 1.7–8.2)
NEUTS SEG NFR BLD: 57 % (ref 43–78)
NRBC # BLD: 0 K/UL (ref 0–0.2)
PLATELET # BLD AUTO: 287 K/UL (ref 150–450)
PMV BLD AUTO: 9.7 FL (ref 10.8–14.1)
POTASSIUM SERPL-SCNC: 3.6 MMOL/L (ref 3.5–5.1)
PROT SERPL-MCNC: 7.3 G/DL (ref 6.3–8.2)
PSA SERPL-MCNC: <0.1 NG/ML
RBC # BLD AUTO: 4.53 M/UL (ref 4.23–5.67)
SODIUM SERPL-SCNC: 145 MMOL/L (ref 136–145)
WBC # BLD AUTO: 5 K/UL (ref 4.3–11.1)

## 2018-01-19 PROCEDURE — 85025 COMPLETE CBC W/AUTO DIFF WBC: CPT | Performed by: INTERNAL MEDICINE

## 2018-01-19 PROCEDURE — 84403 ASSAY OF TOTAL TESTOSTERONE: CPT | Performed by: NURSE PRACTITIONER

## 2018-01-19 PROCEDURE — 36415 COLL VENOUS BLD VENIPUNCTURE: CPT | Performed by: NURSE PRACTITIONER

## 2018-01-19 PROCEDURE — 80053 COMPREHEN METABOLIC PANEL: CPT | Performed by: INTERNAL MEDICINE

## 2018-01-19 PROCEDURE — 84153 ASSAY OF PSA TOTAL: CPT | Performed by: NURSE PRACTITIONER

## 2018-01-20 LAB — TESTOST SERPL-MCNC: 4 NG/DL (ref 264–916)

## 2018-01-25 ENCOUNTER — HOSPITAL ENCOUNTER (OUTPATIENT)
Dept: RADIATION ONCOLOGY | Age: 73
Discharge: HOME | End: 2018-01-25
Payer: MEDICARE

## 2018-01-25 ENCOUNTER — HOSPITAL ENCOUNTER (OUTPATIENT)
Dept: INFUSION THERAPY | Age: 73
Discharge: HOME | End: 2018-01-25
Payer: MEDICARE

## 2018-01-25 VITALS
SYSTOLIC BLOOD PRESSURE: 149 MMHG | WEIGHT: 160 LBS | OXYGEN SATURATION: 95 % | RESPIRATION RATE: 18 BRPM | HEART RATE: 61 BPM | BODY MASS INDEX: 23.63 KG/M2 | TEMPERATURE: 97.6 F | DIASTOLIC BLOOD PRESSURE: 61 MMHG

## 2018-01-25 DIAGNOSIS — C61 PROSTATE CANCER (HCC): ICD-10-CM

## 2018-01-25 DIAGNOSIS — C61 PROSTATE CANCER (HCC): Primary | ICD-10-CM

## 2018-01-25 PROCEDURE — 74011250636 HC RX REV CODE- 250/636: Performed by: NURSE PRACTITIONER

## 2018-01-25 PROCEDURE — 96402 CHEMO HORMON ANTINEOPL SQ/IM: CPT

## 2018-01-25 PROCEDURE — 99211 OFF/OP EST MAY X REQ PHY/QHP: CPT

## 2018-01-25 RX ADMIN — LEUPROLIDE ACETATE 22.5 MG: KIT at 10:40

## 2018-01-25 NOTE — PROGRESS NOTES
Patient: Otoniel Agosto MRN: [id number]  SSN: [id number]    YOB: 1945  Age: 81 y.o. Sex: male      Other Providers:  Shawna Burden MD, Oscar Garcia MD    CHIEF COMPLAINT: Prostate cancer    DIAGNOSIS: High risk prostate cancer, GS 4+5=9. 5/12 cores. PSA 11.9    PREVIOUS TREATMENT:  1) Watchful waiting after biopsy 2015  2) Repeat biopsy   3) 10/13/16:  Lupron 22.5 mg  4) Radiation therapy of the Prostate and pelvic lymph nodes with 25 fractions of 5000 cGy planned, 19 boost of 3420 cGy planned. Treatment dates: 03/29 - 05/31/17    HISTORY OF PRESENT ILLNESS:  Otoniel Agosto is a 81 y.o. male initially seen by Dr. Bandar Mulligan 01/27/17 at the request of Dr. Mode Khan. He is a patient of Dr. Maldonado Willett, previously followed by Dr. Yamini Brooks at [facility]. He originally had a diagnosis of prostate cancer in 2015 when a biopsy showed Mcihelle 6 disease in 1 core, 45%, with a PSA of 4.7, volume 21 cc. He elected for watchful waiting at that time. PSA at initial biopsy 4.7  Feb 2016 6.4  May 2016 5.9  Sept 2016 11.9    In 2016, he transferred care to Dr. Maldonado Willett and PSA devin to 7.34, then to 11.9, prompting repeat biopsy of the prostate that then showed Pattison 4+5 disease. Biopsy showed GS 4+5=9 in 3 cores between 35-45%, 1 core GS 4+4=8 in 35%, and 1 core 4+3=7 in 35% for a total of 5/12 cores. He was also having some right hip pain and pelvic discomfort which prompted systemic restaging. Bone scan revealed some small suspicious uptake in the sternum and in the left proximal femur, with corresponding findings on CT, along with an asymmetrically enlarged right pelvic sidewall internal iliac lymph node measuring 17 mm. These findings were felt to most likely represent metastatic disease. Dr. Maldonado Willett started him on Lupron with a 22.5 mg dose given on 10/3/16. He was referred to medical oncology for evaluation and met with Dr. Lesta Pod.  He recommended short term observation with repeat CT and bone scan after 3 months to determine whether the lesions responded to ADT, which would suggest stage IV disease. CT showed resolution of pelvic adenopathy but bone lesions were stable. PSA was 0.6 consistent with excellent biochemical response but he continued to have pain in the left hip still. Dr. Ángel Caballero was entertaining the possibilty that the bone lesions were not metastases. He chose to obtain MRI of the left hip, and if consistent with a nonneoplastic etiology which was seen 1/12/17, he recommend rad onc referral for consideration of local therapy which is the rationale for our visit today. He is having hot flashes and failed Effexor with changes in his blood pressure. He is able to play golf regularly and would prefer to avoid treatment if possible. INTERVAL HISTORY: Mr. Jenny Wells returns today for follow up 8 months after completing radiation therapy for his high risk prostate cancer. He tolerated radiation therapy well. He has an AUA score of 7/35 and is \"mostly satisfied\" with his urinary function. He continues with occasional urinary frequency (depending on fluid intake) and urgency, otherwise denies incomplete emptying, intermittency or straining. His stream is weak greater  than half the time. He has nocturia x2. He was started on Flomax towards the end of completing radiation therapy. He has tried to wean off Flomax due to joint pain, but found himself not being able to urinate. He is now back on Flomax. He is having normal bowel movements and denies melanic or bloody stools. He began long term ADT, 10/13/16 (3 month dosing) under the management of Dr. Darshana Goodwin but has since switched to Dr. Ángel Caballero. At his previous visit he expressed his concern on continuing ADT due to the side effects he was experiencing. It was then decided to proceed with a 3 month injection to see if better tolerated. He reports today that the 3 month lupron injection was much better tolerated and will continue as planned with his lupron injections.  He was started on Celexa for hot flashes by medical oncology. He reports that the hot flashes are less intense and not as often. He tolerated radiation therapy well. He has an AUA score of 17/35 and is \"mostly satisfied\" with his urinary function. His chief complaint is fatigue, hot flashes and generalized weakness, although doing \"pretty good\" today. He was started on Celexa and feels his hot flashes are not as intense of often. GENITOURINARY ROS:  AUA 6. No major issues. He is able to get an erection. 01 month AUA 10  05 month AUA 17  08 month AUA 7    PAST MEDICAL HISTORY:    Past Medical History:   Diagnosis Date    Anxiety     Arthritis     Elevated prostate specific antigen (PSA) 1/15/2014    Former cigar smoker     GERD (gastroesophageal reflux disease)     Hypertension     Hypertrophy of prostate with urinary obstruction and other lower urinary tract symptoms (LUTS) 1/15/2014    Prostate cancer [facility])        The patient denies history of collagen vascular diseases, pacemaker insertion, prior radiation or prior chemotherapy. PAST SURGICAL HISTORY:   Past Surgical History:   Procedure Laterality Date    HX COLONOSCOPY      HX MALIGNANT SKIN LESION EXCISION      squamous cell - 2-3 times    HX TONSILLECTOMY  as child    KS PROSTATE BIOPSY, NEEDLE, SATURATION SAMPLING         MEDICATIONS:     Current Outpatient Prescriptions:     citalopram (CELEXA) 20 mg tablet, Take 1 Tab by mouth daily. , Disp: 90 Tab, Rfl: 1    tamsulosin (FLOMAX) 0.4 mg capsule, Take 1 Cap by mouth daily. , Disp: 90 Cap, Rfl: 1    cloNIDine (CATAPRES) 0.1 mg/24 hr patch, 1 Patch by TransDERmal route., Disp: , Rfl:     LORazepam (ATIVAN) 1 mg tablet, Take 1 Tab by mouth every six (6) hours as needed for Anxiety. Max Daily Amount: 4 mg. (Patient taking differently: Take 1 mg by mouth every six (6) hours as needed for Anxiety.  Indications: take on the dos if needed), Disp: 90 Tab, Rfl: 3    aspirin delayed-release 81 mg tablet, Take  by mouth every morning. Indications: held for 5 days, Disp: , Rfl:     zolpidem (AMBIEN) 5 mg tablet, Take  by mouth nightly as needed for Sleep., Disp: , Rfl:     ranitidine (ZANTAC) 150 mg tablet, Take 150 mg by mouth nightly., Disp: , Rfl: 1    GLUC ALLEN/CHONDRO ALLEN A/VIT C/MN (GLUCOSAMINE 1500 COMPLEX PO), Take 1 Tab by mouth every morning. Indications: hold as of today, Disp: , Rfl:     MULTIVITAMIN WITH MINERALS (MULTIPLE VITAMIN-MINERALS PO), Take 0.5 Tabs by mouth every morning. Indications: hold until after surgery, Disp: , Rfl:     ALLERGIES:   No Known Allergies    SOCIAL HISTORY:   Social History     Social History    Marital status:      Spouse name: N/A    Number of children: N/A    Years of education: N/A     Occupational History    Not on file. Social History Main Topics    Smoking status: Former Smoker     Types: Cigars    Smokeless tobacco: Never Used    Alcohol use 3.6 - 4.8 oz/week     0 Standard drinks or equivalent, 6 - 8 Cans of beer per week    Drug use: No    Sexual activity: Not on file     Other Topics Concern    Not on file     Social History Narrative       FAMILY HISTORY:   Family History   Problem Relation Age of Onset    Heart Disease Father      pacemaker in his late [de-identified]       REVIEW OF SYSTEMS: Please see the completed review of systems sheet in the chart that I have reviewed today. PHYSICAL EXAMINATION:   ECOG Performance status 1  VITAL SIGNS: Blood pressure 149/61  Pulse  61  Temperature 97.6  Weight  160     GENERAL: The patient is well-developed, ambulatory, alert and in no acute distress. PATHOLOGY:    9/19/16:      DIAGNOSIS   A: \"PROSTATE, LEFT LATERAL BASE, BIOPSY\": PROSTATIC TISSUE WITH CHRONIC INFLAMMATION. B: \"PROSTATE, LEFT LATERAL MID, BIOPSY\": PROSTATIC TISSUE WITH CHRONIC INFLAMMATION. C: \"PROSTATE, LEFT LATERAL APEX, BIOPSY\": PROSTATIC TISSUE WITH FOCAL ACUTE AND CHRONIC INFLAMMATION.    D: \"PROSTATE, LEFT BASE, BIOPSY\": PROSTATIC TISSUE WITH CHRONIC INFLAMMATION. E: \"PROSTATE, LEFT MID, BIOPSY\": PROSTATITIC TISSUE WITH FOCAL ACUTE AND CHRONIC INFLAMMATION. F: \"PROSTATE, LEFT APEX, BIOPSY\": PROSTATIC TISSUE, NO CARCINOMA IDENTIFIED. G: \"PROSTATE, RIGHT BASE, BIOPSY\": PROSTATIC ADENOCARCINOMA, DEBBIE SCORE   4 + 5 = 9, INVOLVING 40% OF BIOPSY. PERINEURAL INVASION PRESENT. H: \"PROSTATE, RIGHT MID, BIOPSY\": PROSTATIC ADENOCARCINOMA, DEBBIE SCORE   4 + 4 = 8, INVOLVING 35% OF BIOPSY. I: \"PROSTATE, RIGHT APEX, BIOPSY\": PROSTATIC TISSUE WITH A SMALL FOCUS OF ATYPICAL GLANDS SUSPICIOUS FOR CARCINOMA. J: \"PROSTATE, RIGHT LATERAL BASE, BIOPSY\": PROSTATIC ADENOCARCINOMA, DEBBIE SCORE 4 + 5 = 9, INVOLVING 45% OF BIOPSY. SUSPICIOUS FOR PERINEURAL INVASION. K: \"PROSTATE, RIGHT LATERAL MID, BIOPSY\": PROSTATIC ADENOCARCINOMA, DEBBIE SCORE 4 + 5 = 9, INVOLVING 45% OF BIOPSY. L: \"PROSTATE, RIGHT LATERAL APEX, BIOPSY\": PROSTATIC ADENOCARCINOMA, DEBBIE SCORE 4 + 3 = 7, INVOLVING 35% OF BIOPSY. LABORATORY:   01/19/18: PSA <0.1 with testosterone of 4  09/15/17: PSA <0.1   06/22/17: PSA <0.1 with testosterone of 13    RADIOLOGY:    Nm Bone Scan Wh Body Result Date: 12/27/2016  Whole-body bone scan INDICATION:  Restaging prostate cancer Whole-body images were obtained after intravenous injection of 26.4 mCi of technetium HDP. COMPARISON:  09/29/2016 FINDINGS:  There is stable focal uptake in the intertrochanteric left hip. Uptake in the midsternum is also stable. There are no developing lesions in the ribs or spine. IMPRESSION:  1.  Stable uptake in the left hip, concerning for metastatic disease. CT appearance is nonspecific, this could also represent a benign chondroid lesion. 2.  Stable uptake in the midsternum. This is also indeterminate, benign uptake can be seen in this region. Mri Hip Shellye Lone Wo Cont    Result Date: 1/12/2017  History: Severe left hip pain.  History of prostate cancer diagnosed one year ago. EXAM: MRI left hip with and without contrast TECHNIQUE: Multiplanar multisequence imaging is performed both before and after the uneventful ministration of 7 cc MultiHance. COMPARISON: Bone scan dated 12/27/2016 FINDINGS: The abnormal focus of uptake on the recent bone scan corresponds to a lobular area of increased T2 signal within the posterior aspect of the intertrochanteric portion of the left femur. This lesion most likely represents an enchondroma. No additional focal marrow lesions demonstrated. Degenerative disc signal noted in the low lumbar spine and pubic symphysis. There is normal signal at the common hamstring tendon origin and at the gluteal muscular insertion. There is a tear of the anterior superior acetabular labrum. No abnormal fluid collection or soft tissue mass. IMPRESSION: 1. The abnormality seen on the bone scan corresponds to an enchondroma within the intertrochanteric portion of the left femur posteriorly. 2. Tear of the anterior superior acetabular labrum on the left. 3. Degenerative changes of the low lumbar spine and pubic symphysis. 4. No osseous metastases seen within the left hip or pelvis. Ct Pelv W Cont  Addendum Date: 12/27/2016    Addendum: Addendum: The sclerotic, intertrochanteric left hip lesion is stable. CT appearance is nonspecific. While this could represent a bony metastatic lesion, could also represent in benign chondroid lesion. Result Date: 12/27/2016  CT of the pelvis INDICATION:  Prostate cancer Multiple axial images were obtained through the pelvis after intravenous injection of 100mL of Isovue 370. Radiation dose reduction techniques were used for this study: All CT scans performed at this facility use one or all of the following: Automated exposure control, adjustment of the mA and/or kVp according to patient's size, iterative reconstruction. Compared with 09/29/2016.  FINDINGS: The right pelvic sidewall lymph node described on the prior exam has completely resorbed. No residual mass or adenopathy is seen in the pelvis. The prostate is stable in size and appearance. No discrete mass is seen. There are no inflammatory changes or fluid collections in the pelvis. There are no significant bony lesions. IMPRESSION: Interval resolution of right pelvic adenopathy       IMPRESSION:  Sailaja German is a 81 y.o. male with initially felt to be metastatic prostate cancer. In September 2016, the bone scan showed 2 suspicious bone lesions and a single 1.7 cm iliac node, suspicious for metastasis. Repeat CT showed stable bone lesion and a responding PSA at 0.6 (PSA was 11.9 prior to Lupron injection given on 10/3/16). An MRI of the left hip was then obtained for his complaint of hip pain that did not show bone metastases. With a stable CT bone lesion and responding PSA, it is believed that the lesion noted on his previous bone scan representing metastases is low. He completed radiation therapy of the prostate bed and lymph nodes on 05/31/17. He is recovering as anticipated from radiation therapy. He has tried to wean off Flomax due to joint pain, but found himself not being able to urinate. He is now back on Flomax. He continues on long term ADT under the management of Dr. Georges Pérez, Medical Oncologist. At his previous visit he expressed his concern on continuing ADT due to the side effects he was experiencing. It was then decided to proceed with a 3 month injection to see if better tolerated. He reports today that the 3 month lupron injection is much better tolerated and has decided to continue as planned with his lupron injections on an every 3 month schedule. He was started on Celexa for hot flashes by medical oncology. He reports that the hot flashes are less intense and not as often.     PLAN:    1) Follow up 3 months with PSA/Testosterone prior to visit  2) Scheduled for lupron today with Dr. Grace Locus office  3) Continue Flomax - discussed trying every other day   4) ADT under the direction of Dr. Parvin Nair - scheduled 3 month injection in January    Nita Trammell NP   January 25, 2018       Portions of this note were copied from prior encounters and reviewed for accuracy, currency, and represent documentation and tasks completed during this encounter. I verify and attest these portions to be unchanged from prior visits.

## 2018-01-25 NOTE — PROGRESS NOTES
Pt here today for FUP post RT to the prostate which ended 5/31/17. Pt stated he tried to go off Flomax because he thinks it is causing his generalized aches, but he \"only lasted a few days\" before he \"dried up\" his urinary flow. Pt is now taking Flomax as ordered. He also has starting receiving Lupron every 3 months instead of every 6 months due to side effects. Last Lupron was 10/26/17. Pt has an AUA score of 7/35 with c/o urinary frequency, hesitancy, weak stream, and nocturia. The 1/19/18 PSA was <0.1, and testosterone 4. The Lupron is ordered by Dr. Colette Santo in [facility].

## 2018-01-25 NOTE — PROGRESS NOTES
Pt ambulatory to area without complaints. Received treatment per order, tolerated well. Aware of next appt on [email or web address]. Advised to call dr with any issues/concerns. Discharged home without complaints.

## 2018-04-16 ENCOUNTER — HOSPITAL ENCOUNTER (OUTPATIENT)
Dept: RADIATION ONCOLOGY | Age: 73
Discharge: HOME | End: 2018-04-16
Payer: MEDICARE

## 2018-04-16 ENCOUNTER — HOSPITAL ENCOUNTER (OUTPATIENT)
Dept: LAB | Age: 73
Discharge: HOME | End: 2018-04-16
Payer: MEDICARE

## 2018-04-16 DIAGNOSIS — C61 PROSTATE CANCER (HCC): ICD-10-CM

## 2018-04-16 LAB
ALBUMIN SERPL-MCNC: 3.6 G/DL (ref 3.2–4.6)
ALBUMIN/GLOB SERPL: 1.1 {RATIO} (ref 1.2–3.5)
ALP SERPL-CCNC: 68 U/L (ref 50–136)
ALT SERPL-CCNC: 21 U/L (ref 12–65)
ANION GAP SERPL CALC-SCNC: 7 MMOL/L (ref 7–16)
AST SERPL-CCNC: 16 U/L (ref 15–37)
BASOPHILS # BLD: 0 K/UL (ref 0–0.2)
BASOPHILS NFR BLD: 1 % (ref 0–2)
BILIRUB SERPL-MCNC: 1 MG/DL (ref 0.2–1.1)
BUN SERPL-MCNC: 19 MG/DL (ref 8–23)
CALCIUM SERPL-MCNC: 8.9 MG/DL (ref 8.3–10.4)
CHLORIDE SERPL-SCNC: 106 MMOL/L (ref 98–107)
CO2 SERPL-SCNC: 29 MMOL/L (ref 21–32)
CREAT SERPL-MCNC: 1.07 MG/DL (ref 0.8–1.5)
DIFFERENTIAL METHOD BLD: ABNORMAL
EOSINOPHIL # BLD: 0.1 K/UL (ref 0–0.8)
EOSINOPHIL NFR BLD: 3 % (ref 0.5–7.8)
ERYTHROCYTE [DISTWIDTH] IN BLOOD BY AUTOMATED COUNT: 13.7 % (ref 11.9–14.6)
GLOBULIN SER CALC-MCNC: 3.2 G/DL (ref 2.3–3.5)
GLUCOSE SERPL-MCNC: 97 MG/DL (ref 65–100)
HCT VFR BLD AUTO: 40.7 % (ref 41.1–50.3)
HGB BLD-MCNC: 13.5 G/DL (ref 13.6–17.2)
LYMPHOCYTES # BLD: 1.1 K/UL (ref 0.5–4.6)
LYMPHOCYTES NFR BLD: 27 % (ref 13–44)
MCH RBC QN AUTO: 30.8 PG (ref 26.1–32.9)
MCHC RBC AUTO-ENTMCNC: 33.2 G/DL (ref 31.4–35)
MCV RBC AUTO: 92.7 FL (ref 79.6–97.8)
MONOCYTES # BLD: 0.4 K/UL (ref 0.1–1.3)
MONOCYTES NFR BLD: 9 % (ref 4–12)
NEUTS SEG # BLD: 2.6 K/UL (ref 1.7–8.2)
NEUTS SEG NFR BLD: 61 % (ref 43–78)
NRBC # BLD: 0 K/UL (ref 0–0.2)
PLATELET # BLD AUTO: 262 K/UL (ref 150–450)
PMV BLD AUTO: 9.3 FL (ref 10.8–14.1)
POTASSIUM SERPL-SCNC: 4 MMOL/L (ref 3.5–5.1)
PROT SERPL-MCNC: 6.8 G/DL (ref 6.3–8.2)
PSA SERPL-MCNC: <0.1 NG/ML
PSA SERPL-MCNC: <0.1 NG/ML
RBC # BLD AUTO: 4.39 M/UL (ref 4.23–5.67)
SODIUM SERPL-SCNC: 142 MMOL/L (ref 136–145)
WBC # BLD AUTO: 4.3 K/UL (ref 4.3–11.1)

## 2018-04-16 PROCEDURE — 80053 COMPREHEN METABOLIC PANEL: CPT | Performed by: INTERNAL MEDICINE

## 2018-04-16 PROCEDURE — 84153 ASSAY OF PSA TOTAL: CPT | Performed by: NURSE PRACTITIONER

## 2018-04-16 PROCEDURE — 36415 COLL VENOUS BLD VENIPUNCTURE: CPT | Performed by: INTERNAL MEDICINE

## 2018-04-16 PROCEDURE — 84153 ASSAY OF PSA TOTAL: CPT | Performed by: INTERNAL MEDICINE

## 2018-04-16 PROCEDURE — 85025 COMPLETE CBC W/AUTO DIFF WBC: CPT | Performed by: INTERNAL MEDICINE

## 2018-04-16 PROCEDURE — 84403 ASSAY OF TOTAL TESTOSTERONE: CPT | Performed by: NURSE PRACTITIONER

## 2018-04-17 LAB — TESTOST SERPL-MCNC: <3 NG/DL (ref 264–916)

## 2018-04-23 ENCOUNTER — HOSPITAL ENCOUNTER (OUTPATIENT)
Dept: INFUSION THERAPY | Age: 73
Discharge: HOME | End: 2018-04-23
Payer: MEDICARE

## 2018-04-23 ENCOUNTER — HOSPITAL ENCOUNTER (OUTPATIENT)
Dept: RADIATION ONCOLOGY | Age: 73
Discharge: HOME | End: 2018-04-23
Payer: MEDICARE

## 2018-04-23 VITALS
RESPIRATION RATE: 16 BRPM | OXYGEN SATURATION: 97 % | BODY MASS INDEX: 23.99 KG/M2 | TEMPERATURE: 98.2 F | DIASTOLIC BLOOD PRESSURE: 93 MMHG | HEIGHT: 69 IN | WEIGHT: 162 LBS | SYSTOLIC BLOOD PRESSURE: 151 MMHG | HEART RATE: 62 BPM

## 2018-04-23 DIAGNOSIS — C61 PROSTATE CANCER (HCC): Primary | ICD-10-CM

## 2018-04-23 DIAGNOSIS — C61 PROSTATE CANCER (HCC): ICD-10-CM

## 2018-04-23 PROCEDURE — 96402 CHEMO HORMON ANTINEOPL SQ/IM: CPT

## 2018-04-23 PROCEDURE — 99211 OFF/OP EST MAY X REQ PHY/QHP: CPT

## 2018-04-23 PROCEDURE — 74011250636 HC RX REV CODE- 250/636: Performed by: INTERNAL MEDICINE

## 2018-04-23 RX ORDER — TAMSULOSIN HYDROCHLORIDE 0.4 MG/1
0.4 CAPSULE ORAL DAILY
Qty: 30 CAP | Refills: 6 | Status: SHIPPED | OUTPATIENT
Start: 2018-04-23 | End: 2018-05-31

## 2018-04-23 RX ADMIN — LEUPROLIDE ACETATE 22.5 MG: KIT at 15:17

## 2018-04-23 NOTE — PROGRESS NOTES
Pt here today for FUP post RT to the prostate which ended 5/31/17. His 4/16/18 PSA is <0.1, Testosterone <3. Pt has a Lupron shot on 1/25/18. He is taking Flomax and has an AUA score of 10/35 with c/o weak stream, and nocturia. He will return in 6 months for FUP with labs prior. Pt will have his next Lupron shot again today here at [facility], and then again on 7/23/18.

## 2018-04-23 NOTE — PROGRESS NOTES
Arrived to the [facility]. Lupron given IM in left arm. Patient tolerated well. Any issues or concerns during appointment: none. Patient aware of next infusion appointment on 7-23-18 (date) at 11:15 (time). Discharged via ambulatory.

## 2018-04-23 NOTE — PROGRESS NOTES
Patient: Betzaida Green MRN: [id number]  SSN: [id number]    YOB: 1945  Age: 81 y.o. Sex: male      Other Providers:  Georgie Gavin MD, Emory Jackson MD    CHIEF COMPLAINT: Prostate cancer    DIAGNOSIS: High risk prostate cancer, GS 4+5=9. 5/12 cores. PSA 11.9    PREVIOUS TREATMENT:  1) Watchful waiting after biopsy 2015  2) Repeat biopsy   3) 10/13/16:  Lupron 22.5 mg  4) Radiation therapy of the Prostate and pelvic lymph nodes with 25 fractions of 5000 cGy planned, 19 boost of 3420 cGy planned. Treatment dates: 03/29 - 05/31/17    HISTORY OF PRESENT ILLNESS:  Betzaida Green is a 81 y.o. male initially seen in our office 01/27/17 at the request of Dr. Juan Jose Dutta. He is a patient of Dr. Sonia Crandall, previously followed by Dr. Bea Lazaro at [facility]. He originally had a diagnosis of prostate cancer in 2015 when a biopsy showed Michelle 6 disease in 1 core, 45%, with a PSA of 4.7, volume 21 cc. He elected for watchful waiting at that time. PSA at initial biopsy 4.7  Feb 2016 6.4  May 2016 5.9  Sept 2016 11.9    In 2016, he transferred care to Dr. Sonia Crandall and PSA devin to 7.34, then to 11.9, prompting repeat biopsy of the prostate that then showed [place] 4+5 disease. Biopsy showed GS 4+5=9 in 3 cores between 35-45%, 1 core GS 4+4=8 in 35%, and 1 core 4+3=7 in 35% for a total of 5/12 cores. He was also having some right hip pain and pelvic discomfort which prompted systemic restaging. Bone scan revealed some small suspicious uptake in the sternum and in the left proximal femur, with corresponding findings on CT, along with an asymmetrically enlarged right pelvic sidewall internal iliac lymph node measuring 17 mm. These findings were felt to most likely represent metastatic disease. Dr. Sonia Crandall started him on Lupron with a 22.5 mg dose given on 10/3/16. He was referred to medical oncology for evaluation and met with Dr. Juan Jose Dutta.  He recommended short term observation with repeat CT and bone scan after 3 months to determine whether the lesions responded to ADT, which would suggest stage IV disease. CT showed resolution of pelvic adenopathy but bone lesions were stable. PSA was 0.6 consistent with excellent biochemical response but he continued to have pain in the left hip still. Dr. Gely Vasquez was entertaining the possibilty that the bone lesions were not metastases. He chose to obtain MRI of the left hip, and if consistent with a nonneoplastic etiology which was seen 1/12/17, he recommend rad onc referral for consideration of local therapy which was completed 5/31/17. INTERVAL HISTORY: Mr. Jenni Coleman returns today for follow up 12 months after completing radiation therapy for his high risk prostate cancer. He tolerated radiation therapy well. He continues with occasional urinary frequency (depending on fluid intake) and urgency, otherwise denies incomplete emptying, intermittency or straining. His stream is weak greater  than half the time. He has nocturia x2. He was started on Flomax towards the end of completing radiation therapy. He has tried to wean off Flomax due to joint pain, but found himself not being able to urinate. He is having normal bowel movements and denies melanic or bloody stools. He began long term ADT, 10/13/16 (3 month dosing) under the management of Dr. Karen Trujillo but has since switched to Dr. Gely Vasquez. At his previous visit he expressed his concern on continuing ADT due to the side effects he was experiencing. It was then decided to proceed with a 3 month injection to see if better tolerated which it was and is therefore what he is continuing. He was started on Celexa for hot flashes by medical oncology. He reports that the hot flashes are less intense and not as often. GENITOURINARY ROS:  Pretreatment AUA 6. No major issues. He is able to get an erection.      01 month AUA 10  05 month AUA 17  08 month AUA 7  12 month AUA 10    PAST MEDICAL HISTORY:    Past Medical History:   Diagnosis Date    Anxiety  Arthritis     Elevated prostate specific antigen (PSA) 1/15/2014    Former cigar smoker     GERD (gastroesophageal reflux disease)     Hypertension     Hypertrophy of prostate with urinary obstruction and other lower urinary tract symptoms (LUTS) 1/15/2014    [facility]          PAST SURGICAL HISTORY:   Past Surgical History:   Procedure Laterality Date    HX COLONOSCOPY      HX MALIGNANT SKIN LESION EXCISION      squamous cell - 2-3 times    HX TONSILLECTOMY  as child    CA PROSTATE BIOPSY, NEEDLE, SATURATION SAMPLING         MEDICATIONS:     Current Outpatient Prescriptions:     citalopram (CELEXA) 20 mg tablet, Take 1 Tab by mouth daily. , Disp: 90 Tab, Rfl: 1    tamsulosin (FLOMAX) 0.4 mg capsule, Take 1 Cap by mouth daily. , Disp: 90 Cap, Rfl: 1    LORazepam (ATIVAN) 1 mg tablet, Take 1 Tab by mouth every six (6) hours as needed for Anxiety. Max Daily Amount: 4 mg. (Patient taking differently: Take 1 mg by mouth every six (6) hours as needed for Anxiety. Indications: take on the dos if needed), Disp: 90 Tab, Rfl: 3    aspirin delayed-release 81 mg tablet, Take  by mouth every morning. Indications: held for 5 days, Disp: , Rfl:     zolpidem (AMBIEN) 5 mg tablet, Take  by mouth nightly as needed for Sleep., Disp: , Rfl:     ranitidine (ZANTAC) 150 mg tablet, Take 150 mg by mouth nightly., Disp: , Rfl: 1    GLUC ALLEN/CHONDRO ALLEN A/VIT C/MN (GLUCOSAMINE 1500 COMPLEX PO), Take 1 Tab by mouth every morning. Indications: hold as of today, Disp: , Rfl:     MULTIVITAMIN WITH MINERALS (MULTIPLE VITAMIN-MINERALS PO), Take 0.5 Tabs by mouth every morning. Indications: hold until after surgery, Disp: , Rfl:     NIFEdipine (PROCARDIA) 10 mg capsule, Take 10 mg by mouth daily. , Disp: , Rfl:     ALLERGIES:   No Known Allergies    SOCIAL HISTORY:   Social History     Social History    Marital status:      Spouse name: N/A    Number of children: N/A    Years of education: N/A Occupational History    Not on file. Social History Main Topics    Smoking status: Former Smoker     Types: Cigars    Smokeless tobacco: Never Used    Alcohol use 3.6 - 4.8 oz/week     0 Standard drinks or equivalent, 6 - 8 Cans of beer per week    Drug use: No    Sexual activity: Not on file     Other Topics Concern    Not on file     Social History Narrative       FAMILY HISTORY:   Family History   Problem Relation Age of Onset    Heart Disease Father      pacemaker in his late [de-identified]         PHYSICAL EXAMINATION:   ECOG Performance status 1  VITAL SIGNS:   Visit Vitals    BP (!) 151/93 (BP 1 Location: Left arm, BP Patient Position: Sitting)    Pulse 62    Temp 98.2 °F (36.8 °C)    Resp 16    Ht 5' 9\" (1.753 m)    Wt 73.5 kg (162 lb)    SpO2 97%    BMI 23.92 kg/m2      GENERAL: The patient is well-developed, ambulatory, alert and in no acute distress. CARDIOVASCULAR: Heart is regular rate and rhythm. There are no murmurs rubs or gallups. Radial pulses are 2+ RESPIRATORY: Lungs are clear to auscultation and percussion. There is normal respiratory effort. GASTROINTESTINAL: The abdomen is soft, non-tender, nondistended with no hepatospelnomagaly. Digital rectal examination: deferred LYMPHATIC: There is no cervical, supraclavicular or axillary lymphadenopathy bilaterally. MUSCULOSKELETAL: Extremities reveal no cyanosis, clubbing or edema.  is 5+/5. PATHOLOGY:    9/19/16:      DIAGNOSIS   A: \"PROSTATE, LEFT LATERAL BASE, BIOPSY\": PROSTATIC TISSUE WITH CHRONIC INFLAMMATION. B: \"PROSTATE, LEFT LATERAL MID, BIOPSY\": PROSTATIC TISSUE WITH CHRONIC INFLAMMATION. C: \"PROSTATE, LEFT LATERAL APEX, BIOPSY\": PROSTATIC TISSUE WITH FOCAL ACUTE AND CHRONIC INFLAMMATION. D: \"PROSTATE, LEFT BASE, BIOPSY\": PROSTATIC TISSUE WITH CHRONIC INFLAMMATION. E: \"PROSTATE, LEFT MID, BIOPSY\": PROSTATITIC TISSUE WITH FOCAL ACUTE AND CHRONIC INFLAMMATION.    F: \"PROSTATE, LEFT APEX, BIOPSY\": PROSTATIC TISSUE, NO CARCINOMA IDENTIFIED. G: \"PROSTATE, RIGHT BASE, BIOPSY\": PROSTATIC ADENOCARCINOMA, DEBBIE SCORE   4 + 5 = 9, INVOLVING 40% OF BIOPSY. PERINEURAL INVASION PRESENT. H: \"PROSTATE, RIGHT MID, BIOPSY\": PROSTATIC ADENOCARCINOMA, DEBBIE SCORE   4 + 4 = 8, INVOLVING 35% OF BIOPSY. I: \"PROSTATE, RIGHT APEX, BIOPSY\": PROSTATIC TISSUE WITH A SMALL FOCUS OF ATYPICAL GLANDS SUSPICIOUS FOR CARCINOMA. J: \"PROSTATE, RIGHT LATERAL BASE, BIOPSY\": PROSTATIC ADENOCARCINOMA, DEBBIE SCORE 4 + 5 = 9, INVOLVING 45% OF BIOPSY. SUSPICIOUS FOR PERINEURAL INVASION. K: \"PROSTATE, RIGHT LATERAL MID, BIOPSY\": PROSTATIC ADENOCARCINOMA, DEBBIE SCORE 4 + 5 = 9, INVOLVING 45% OF BIOPSY. L: \"PROSTATE, RIGHT LATERAL APEX, BIOPSY\": PROSTATIC ADENOCARCINOMA, DEBBIE SCORE 4 + 3 = 7, INVOLVING 35% OF BIOPSY. LABORATORY:   01/19/18: PSA <0.1 with testosterone of 4  09/15/17: PSA <0.1   06/22/17: PSA <0.1 with testosterone of 13  4/16/18:  PSA <0.1 with testosterone <3    RADIOLOGY:    Nm Bone Scan Wh Body Result Date: 12/27/2016  Whole-body bone scan INDICATION:  Restaging prostate cancer Whole-body images were obtained after intravenous injection of 26.4 mCi of technetium HDP. COMPARISON:  09/29/2016 FINDINGS:  There is stable focal uptake in the intertrochanteric left hip. Uptake in the midsternum is also stable. There are no developing lesions in the ribs or spine. IMPRESSION:  1.  Stable uptake in the left hip, concerning for metastatic disease. CT appearance is nonspecific, this could also represent a benign chondroid lesion. 2.  Stable uptake in the midsternum. This is also indeterminate, benign uptake can be seen in this region. Mri Hip Uzma Roper Wo Cont    Result Date: 1/12/2017  History: Severe left hip pain. History of prostate cancer diagnosed one year ago.  EXAM: MRI left hip with and without contrast TECHNIQUE: Multiplanar multisequence imaging is performed both before and after the uneventful ministration of 7 cc MultiHance. COMPARISON: Bone scan dated 12/27/2016 FINDINGS: The abnormal focus of uptake on the recent bone scan corresponds to a lobular area of increased T2 signal within the posterior aspect of the intertrochanteric portion of the left femur. This lesion most likely represents an enchondroma. No additional focal marrow lesions demonstrated. Degenerative disc signal noted in the low lumbar spine and pubic symphysis. There is normal signal at the common hamstring tendon origin and at the gluteal muscular insertion. There is a tear of the anterior superior acetabular labrum. No abnormal fluid collection or soft tissue mass. IMPRESSION: 1. The abnormality seen on the bone scan corresponds to an enchondroma within the intertrochanteric portion of the left femur posteriorly. 2. Tear of the anterior superior acetabular labrum on the left. 3. Degenerative changes of the low lumbar spine and pubic symphysis. 4. No osseous metastases seen within the left hip or pelvis. Ct Pelv W Cont  Addendum Date: 12/27/2016    Addendum: Addendum: The sclerotic, intertrochanteric left hip lesion is stable. CT appearance is nonspecific. While this could represent a bony metastatic lesion, could also represent in benign chondroid lesion. Result Date: 12/27/2016  CT of the pelvis INDICATION:  Prostate cancer Multiple axial images were obtained through the pelvis after intravenous injection of 100mL of Isovue 370. Radiation dose reduction techniques were used for this study: All CT scans performed at this facility use one or all of the following: Automated exposure control, adjustment of the mA and/or kVp according to patient's size, iterative reconstruction. Compared with 09/29/2016. FINDINGS: The right pelvic sidewall lymph node described on the prior exam has completely resorbed. No residual mass or adenopathy is seen in the pelvis. The prostate is stable in size and appearance. No discrete mass is seen.   There are no inflammatory changes or fluid collections in the pelvis. There are no significant bony lesions. IMPRESSION: Interval resolution of right pelvic adenopathy       IMPRESSION:  Betzaida Green is a 81 y.o. male with initially felt to be metastatic prostate cancer. In September 2016, the bone scan showed 2 suspicious bone lesions and a single 1.7 cm iliac node, suspicious for metastasis. Repeat CT showed stable bone lesion and a responding PSA at 0.6 (PSA was 11.9 prior to Lupron injection given on 10/3/16). An MRI of the left hip was then obtained for his complaint of hip pain that did not show bone metastases. With a stable CT bone lesion and responding PSA, it was believed that the lesion noted on his previous bone scan representing metastases is low. He completed radiation therapy of the prostate bed and lymph nodes on 05/31/17. He has some ill effects in keeping with typical influences of radiation. He has tried to wean off Flomax due to joint pain, but found himself not being able to urinate and thus has continued. He continues on long term ADT under the management of Dr. Juan Jose Dutta, Medical Oncologist.     PLAN:    1) Follow up 6 months with PSA/Testosterone prior to visit  2) Scheduled for lupron with Dr. Sheril Eisenmenger office  3) Continue Flomax daily. Danna Gaming MD   April 23, 2018       Portions of this note were copied from prior encounters and reviewed for accuracy, currency, and represent documentation and tasks completed during this encounter. I verify and attest these portions to be unchanged from prior visits.

## 2018-05-31 DIAGNOSIS — C61 PROSTATE CANCER (HCC): Primary | ICD-10-CM

## 2018-05-31 RX ORDER — TAMSULOSIN HYDROCHLORIDE 0.4 MG/1
0.4 CAPSULE ORAL DAILY
Qty: 90 CAP | Refills: 1 | Status: SHIPPED
Start: 2018-05-31 | End: 2019-05-01 | Stop reason: SDUPTHER

## 2018-07-23 ENCOUNTER — HOSPITAL ENCOUNTER (OUTPATIENT)
Dept: INFUSION THERAPY | Age: 73
Discharge: HOME | End: 2018-07-23
Payer: MEDICARE

## 2018-07-23 ENCOUNTER — HOSPITAL ENCOUNTER (OUTPATIENT)
Dept: LAB | Age: 73
Discharge: HOME | End: 2018-07-23
Payer: MEDICARE

## 2018-07-23 DIAGNOSIS — C61 PROSTATE CANCER (HCC): ICD-10-CM

## 2018-07-23 LAB
ALBUMIN SERPL-MCNC: 4 G/DL (ref 3.2–4.6)
ALBUMIN/GLOB SERPL: 1.2 {RATIO} (ref 1.2–3.5)
ALP SERPL-CCNC: 74 U/L (ref 50–136)
ALT SERPL-CCNC: 24 U/L (ref 12–65)
ANION GAP SERPL CALC-SCNC: 8 MMOL/L (ref 7–16)
AST SERPL-CCNC: 18 U/L (ref 15–37)
BASOPHILS # BLD: 0 K/UL (ref 0–0.2)
BASOPHILS NFR BLD: 0 % (ref 0–2)
BILIRUB SERPL-MCNC: 1 MG/DL (ref 0.2–1.1)
BUN SERPL-MCNC: 20 MG/DL (ref 8–23)
CALCIUM SERPL-MCNC: 9.4 MG/DL (ref 8.3–10.4)
CHLORIDE SERPL-SCNC: 100 MMOL/L (ref 98–107)
CO2 SERPL-SCNC: 30 MMOL/L (ref 21–32)
CREAT SERPL-MCNC: 1.22 MG/DL (ref 0.8–1.5)
DIFFERENTIAL METHOD BLD: ABNORMAL
EOSINOPHIL # BLD: 0.1 K/UL (ref 0–0.8)
EOSINOPHIL NFR BLD: 2 % (ref 0.5–7.8)
ERYTHROCYTE [DISTWIDTH] IN BLOOD BY AUTOMATED COUNT: 13.3 % (ref 11.9–14.6)
GLOBULIN SER CALC-MCNC: 3.4 G/DL (ref 2.3–3.5)
GLUCOSE SERPL-MCNC: 128 MG/DL (ref 65–100)
HCT VFR BLD AUTO: 43 % (ref 41.1–50.3)
HGB BLD-MCNC: 14.6 G/DL (ref 13.6–17.2)
LYMPHOCYTES # BLD: 1.6 K/UL (ref 0.5–4.6)
LYMPHOCYTES NFR BLD: 24 % (ref 13–44)
MCH RBC QN AUTO: 31.2 PG (ref 26.1–32.9)
MCHC RBC AUTO-ENTMCNC: 34 G/DL (ref 31.4–35)
MCV RBC AUTO: 91.9 FL (ref 79.6–97.8)
MONOCYTES # BLD: 0.5 K/UL (ref 0.1–1.3)
MONOCYTES NFR BLD: 8 % (ref 4–12)
NEUTS SEG # BLD: 4.4 K/UL (ref 1.7–8.2)
NEUTS SEG NFR BLD: 66 % (ref 43–78)
NRBC # BLD: 0 K/UL (ref 0–0.2)
PLATELET # BLD AUTO: 271 K/UL (ref 150–450)
PMV BLD AUTO: 9 FL (ref 10.8–14.1)
POTASSIUM SERPL-SCNC: 3 MMOL/L (ref 3.5–5.1)
PROT SERPL-MCNC: 7.4 G/DL (ref 6.3–8.2)
PSA SERPL-MCNC: <0.1 NG/ML
RBC # BLD AUTO: 4.68 M/UL (ref 4.23–5.67)
SODIUM SERPL-SCNC: 138 MMOL/L (ref 136–145)
WBC # BLD AUTO: 6.7 K/UL (ref 4.3–11.1)

## 2018-07-23 PROCEDURE — 36415 COLL VENOUS BLD VENIPUNCTURE: CPT | Performed by: INTERNAL MEDICINE

## 2018-07-23 PROCEDURE — 80053 COMPREHEN METABOLIC PANEL: CPT | Performed by: INTERNAL MEDICINE

## 2018-07-23 PROCEDURE — 96402 CHEMO HORMON ANTINEOPL SQ/IM: CPT

## 2018-07-23 PROCEDURE — 85025 COMPLETE CBC W/AUTO DIFF WBC: CPT | Performed by: INTERNAL MEDICINE

## 2018-07-23 PROCEDURE — 74011250636 HC RX REV CODE- 250/636: Performed by: NURSE PRACTITIONER

## 2018-07-23 PROCEDURE — 84153 ASSAY OF PSA TOTAL: CPT | Performed by: INTERNAL MEDICINE

## 2018-07-23 RX ADMIN — LEUPROLIDE ACETATE 22.5 MG: KIT at 12:57

## 2018-07-23 NOTE — PROGRESS NOTES
Arrived to the [facility]. [facility] IM completed. Patient tolerated well. Any issues or concerns during appointment: none   Patient aware of next infusion appointment on  10/22/18 at 11:15am  Discharged ambulatory.

## 2018-10-22 ENCOUNTER — HOSPITAL ENCOUNTER (OUTPATIENT)
Dept: INFUSION THERAPY | Age: 73
Discharge: HOME | End: 2018-10-22
Payer: MEDICARE

## 2018-10-22 ENCOUNTER — HOSPITAL ENCOUNTER (OUTPATIENT)
Dept: LAB | Age: 73
Discharge: HOME | End: 2018-10-22
Payer: MEDICARE

## 2018-10-22 DIAGNOSIS — C61 PROSTATE CANCER (HCC): Primary | ICD-10-CM

## 2018-10-22 DIAGNOSIS — C61 PROSTATE CANCER (HCC): ICD-10-CM

## 2018-10-22 LAB
ALBUMIN SERPL-MCNC: 3.7 G/DL (ref 3.2–4.6)
ALBUMIN/GLOB SERPL: 1 {RATIO} (ref 1.2–3.5)
ALP SERPL-CCNC: 66 U/L (ref 50–136)
ALT SERPL-CCNC: 25 U/L (ref 12–65)
ANION GAP SERPL CALC-SCNC: 4 MMOL/L (ref 7–16)
AST SERPL-CCNC: 20 U/L (ref 15–37)
BASOPHILS # BLD: 0 K/UL (ref 0–0.2)
BASOPHILS NFR BLD: 1 % (ref 0–2)
BILIRUB SERPL-MCNC: 1.3 MG/DL (ref 0.2–1.1)
BUN SERPL-MCNC: 26 MG/DL (ref 8–23)
CALCIUM SERPL-MCNC: 9.3 MG/DL (ref 8.3–10.4)
CHLORIDE SERPL-SCNC: 102 MMOL/L (ref 98–107)
CO2 SERPL-SCNC: 33 MMOL/L (ref 21–32)
CREAT SERPL-MCNC: 1.17 MG/DL (ref 0.8–1.5)
DIFFERENTIAL METHOD BLD: ABNORMAL
EOSINOPHIL # BLD: 0.1 K/UL (ref 0–0.8)
EOSINOPHIL NFR BLD: 2 % (ref 0.5–7.8)
ERYTHROCYTE [DISTWIDTH] IN BLOOD BY AUTOMATED COUNT: 12.6 % (ref 11.9–14.6)
GLOBULIN SER CALC-MCNC: 3.6 G/DL (ref 2.3–3.5)
GLUCOSE SERPL-MCNC: 121 MG/DL (ref 65–100)
HCT VFR BLD AUTO: 44.8 % (ref 41.1–50.3)
HGB BLD-MCNC: 15 G/DL (ref 13.6–17.2)
IMM GRANULOCYTES # BLD: 0 K/UL (ref 0–0.5)
IMM GRANULOCYTES NFR BLD AUTO: 1 % (ref 0–5)
LYMPHOCYTES # BLD: 1.7 K/UL (ref 0.5–4.6)
LYMPHOCYTES NFR BLD: 31 % (ref 13–44)
MCH RBC QN AUTO: 30.9 PG (ref 26.1–32.9)
MCHC RBC AUTO-ENTMCNC: 33.5 G/DL (ref 31.4–35)
MCV RBC AUTO: 92.2 FL (ref 79.6–97.8)
MONOCYTES # BLD: 0.4 K/UL (ref 0.1–1.3)
MONOCYTES NFR BLD: 8 % (ref 4–12)
NEUTS SEG # BLD: 3.2 K/UL (ref 1.7–8.2)
NEUTS SEG NFR BLD: 58 % (ref 43–78)
NRBC # BLD: 0 K/UL (ref 0–0.2)
PLATELET # BLD AUTO: 254 K/UL (ref 150–450)
PMV BLD AUTO: 9 FL (ref 9.4–12.3)
POTASSIUM SERPL-SCNC: 3.1 MMOL/L (ref 3.5–5.1)
PROT SERPL-MCNC: 7.3 G/DL (ref 6.3–8.2)
PSA SERPL-MCNC: <0.1 NG/ML
RBC # BLD AUTO: 4.86 M/UL (ref 4.23–5.67)
SODIUM SERPL-SCNC: 139 MMOL/L (ref 136–145)
WBC # BLD AUTO: 5.4 K/UL (ref 4.3–11.1)

## 2018-10-22 PROCEDURE — 74011250636 HC RX REV CODE- 250/636: Performed by: INTERNAL MEDICINE

## 2018-10-22 PROCEDURE — 84153 ASSAY OF PSA TOTAL: CPT

## 2018-10-22 PROCEDURE — 85025 COMPLETE CBC W/AUTO DIFF WBC: CPT

## 2018-10-22 PROCEDURE — 80053 COMPREHEN METABOLIC PANEL: CPT

## 2018-10-22 PROCEDURE — 36415 COLL VENOUS BLD VENIPUNCTURE: CPT

## 2018-10-22 PROCEDURE — 96402 CHEMO HORMON ANTINEOPL SQ/IM: CPT

## 2018-10-22 RX ADMIN — LEUPROLIDE ACETATE 45 MG: KIT SUBCUTANEOUS at 12:45

## 2018-10-22 NOTE — PROGRESS NOTES
Arrived to the [facility]. Lupron injection completed. Patient tolerated well. Any issues or concerns during appointment: None. Patient aware of next infusion appointment not scheduled at this time. Discharged ambulatory to home.

## 2018-10-25 ENCOUNTER — APPOINTMENT (OUTPATIENT)
Dept: RADIATION ONCOLOGY | Age: 73
End: 2018-10-25

## 2019-05-01 ENCOUNTER — HOSPITAL ENCOUNTER (OUTPATIENT)
Dept: LAB | Age: 74
Discharge: HOME | End: 2019-05-01
Payer: MEDICARE

## 2019-05-01 DIAGNOSIS — C61 PROSTATE CANCER (HCC): ICD-10-CM

## 2019-05-01 LAB
ALBUMIN SERPL-MCNC: 3.8 G/DL (ref 3.2–4.6)
ALBUMIN/GLOB SERPL: 1.1 {RATIO}
ALP SERPL-CCNC: 66 U/L (ref 50–136)
ALT SERPL-CCNC: 25 U/L (ref 12–65)
ANION GAP SERPL CALC-SCNC: 7 MMOL/L (ref 7–16)
AST SERPL-CCNC: 23 U/L (ref 15–37)
BASOPHILS # BLD: 0 K/UL (ref 0–0.2)
BASOPHILS NFR BLD: 0 % (ref 0–2)
BILIRUB SERPL-MCNC: 1.1 MG/DL (ref 0.2–1.1)
BUN SERPL-MCNC: 25 MG/DL (ref 8–23)
CALCIUM SERPL-MCNC: 9.6 MG/DL (ref 8.3–10.4)
CHLORIDE SERPL-SCNC: 101 MMOL/L (ref 98–107)
CO2 SERPL-SCNC: 31 MMOL/L (ref 21–32)
CREAT SERPL-MCNC: 1.2 MG/DL (ref 0.8–1.5)
DIFFERENTIAL METHOD BLD: ABNORMAL
EOSINOPHIL # BLD: 0.1 K/UL (ref 0–0.8)
EOSINOPHIL NFR BLD: 2 % (ref 0.5–7.8)
ERYTHROCYTE [DISTWIDTH] IN BLOOD BY AUTOMATED COUNT: 12.6 % (ref 11.9–14.6)
GLOBULIN SER CALC-MCNC: 3.4 G/DL
GLUCOSE SERPL-MCNC: 113 MG/DL (ref 65–100)
HCT VFR BLD AUTO: 41.9 % (ref 41.1–50.3)
HGB BLD-MCNC: 14 G/DL (ref 13.6–17.2)
IMM GRANULOCYTES # BLD AUTO: 0 K/UL (ref 0–0.5)
IMM GRANULOCYTES NFR BLD AUTO: 0 % (ref 0–5)
LYMPHOCYTES # BLD: 1.5 K/UL (ref 0.5–4.6)
LYMPHOCYTES NFR BLD: 21 % (ref 13–44)
MCH RBC QN AUTO: 31.5 PG (ref 26.1–32.9)
MCHC RBC AUTO-ENTMCNC: 33.4 G/DL (ref 31.4–35)
MCV RBC AUTO: 94.2 FL (ref 79.6–97.8)
MONOCYTES # BLD: 0.5 K/UL (ref 0.1–1.3)
MONOCYTES NFR BLD: 8 % (ref 4–12)
NEUTS SEG # BLD: 4.7 K/UL (ref 1.7–8.2)
NEUTS SEG NFR BLD: 69 % (ref 43–78)
NRBC # BLD: 0 K/UL (ref 0–0.2)
PLATELET # BLD AUTO: 242 K/UL (ref 150–450)
PMV BLD AUTO: 9 FL (ref 9.4–12.3)
POTASSIUM SERPL-SCNC: 3 MMOL/L (ref 3.5–5.1)
PROT SERPL-MCNC: 7.2 G/DL (ref 6.3–8.2)
PSA SERPL-MCNC: <0.1 NG/ML
RBC # BLD AUTO: 4.45 M/UL (ref 4.23–5.67)
SODIUM SERPL-SCNC: 139 MMOL/L (ref 136–145)
WBC # BLD AUTO: 6.8 K/UL (ref 4.3–11.1)

## 2019-05-01 PROCEDURE — 36415 COLL VENOUS BLD VENIPUNCTURE: CPT

## 2019-05-01 PROCEDURE — 84153 ASSAY OF PSA TOTAL: CPT

## 2019-05-01 PROCEDURE — 85025 COMPLETE CBC W/AUTO DIFF WBC: CPT

## 2019-05-01 PROCEDURE — 80053 COMPREHEN METABOLIC PANEL: CPT

## 2019-11-04 ENCOUNTER — HOSPITAL ENCOUNTER (OUTPATIENT)
Dept: LAB | Age: 74
Discharge: HOME | End: 2019-11-04
Payer: MEDICARE

## 2019-11-04 DIAGNOSIS — C61 PROSTATE CANCER (HCC): ICD-10-CM

## 2019-11-04 LAB
ALBUMIN SERPL-MCNC: 3.8 G/DL (ref 3.2–4.6)
ALBUMIN/GLOB SERPL: 1.2 {RATIO} (ref 1.2–3.5)
ALP SERPL-CCNC: 67 U/L (ref 50–136)
ALT SERPL-CCNC: 23 U/L (ref 12–65)
ANION GAP SERPL CALC-SCNC: 5 MMOL/L (ref 7–16)
AST SERPL-CCNC: 16 U/L (ref 15–37)
BASOPHILS # BLD: 0 K/UL (ref 0–0.2)
BASOPHILS NFR BLD: 0 % (ref 0–2)
BILIRUB SERPL-MCNC: 1.1 MG/DL (ref 0.2–1.1)
BUN SERPL-MCNC: 25 MG/DL (ref 8–23)
CALCIUM SERPL-MCNC: 9.2 MG/DL (ref 8.3–10.4)
CHLORIDE SERPL-SCNC: 102 MMOL/L (ref 98–107)
CO2 SERPL-SCNC: 32 MMOL/L (ref 21–32)
CREAT SERPL-MCNC: 1.31 MG/DL (ref 0.8–1.5)
DIFFERENTIAL METHOD BLD: ABNORMAL
EOSINOPHIL # BLD: 0.1 K/UL (ref 0–0.8)
EOSINOPHIL NFR BLD: 1 % (ref 0.5–7.8)
ERYTHROCYTE [DISTWIDTH] IN BLOOD BY AUTOMATED COUNT: 12.6 % (ref 11.9–14.6)
GLOBULIN SER CALC-MCNC: 3.3 G/DL (ref 2.3–3.5)
GLUCOSE SERPL-MCNC: 125 MG/DL (ref 65–100)
HCT VFR BLD AUTO: 42.5 % (ref 41.1–50.3)
HGB BLD-MCNC: 14 G/DL (ref 13.6–17.2)
IMM GRANULOCYTES # BLD AUTO: 0.1 K/UL (ref 0–0.5)
IMM GRANULOCYTES NFR BLD AUTO: 1 % (ref 0–5)
LYMPHOCYTES # BLD: 1.4 K/UL (ref 0.5–4.6)
LYMPHOCYTES NFR BLD: 15 % (ref 13–44)
MCH RBC QN AUTO: 31 PG (ref 26.1–32.9)
MCHC RBC AUTO-ENTMCNC: 32.9 G/DL (ref 31.4–35)
MCV RBC AUTO: 94.2 FL (ref 79.6–97.8)
MONOCYTES # BLD: 0.6 K/UL (ref 0.1–1.3)
MONOCYTES NFR BLD: 6 % (ref 4–12)
NEUTS SEG # BLD: 6.9 K/UL (ref 1.7–8.2)
NEUTS SEG NFR BLD: 76 % (ref 43–78)
NRBC # BLD: 0 K/UL (ref 0–0.2)
PLATELET # BLD AUTO: 258 K/UL (ref 150–450)
PMV BLD AUTO: 9.3 FL (ref 9.4–12.3)
POTASSIUM SERPL-SCNC: 3.5 MMOL/L (ref 3.5–5.1)
PROT SERPL-MCNC: 7.1 G/DL (ref 6.3–8.2)
PSA SERPL-MCNC: <0.1 NG/ML
RBC # BLD AUTO: 4.51 M/UL (ref 4.23–5.67)
SODIUM SERPL-SCNC: 139 MMOL/L (ref 136–145)
WBC # BLD AUTO: 9.1 K/UL (ref 4.3–11.1)

## 2019-11-04 PROCEDURE — 84153 ASSAY OF PSA TOTAL: CPT

## 2019-11-04 PROCEDURE — 36415 COLL VENOUS BLD VENIPUNCTURE: CPT

## 2019-11-04 PROCEDURE — 85025 COMPLETE CBC W/AUTO DIFF WBC: CPT

## 2019-11-04 PROCEDURE — 80053 COMPREHEN METABOLIC PANEL: CPT

## 2020-11-09 ENCOUNTER — HOSPITAL ENCOUNTER (OUTPATIENT)
Dept: LAB | Age: 75
Discharge: HOME | End: 2020-11-09
Payer: MEDICARE

## 2020-11-09 DIAGNOSIS — C61 PROSTATE CANCER (HCC): ICD-10-CM

## 2020-11-09 LAB
ALBUMIN SERPL-MCNC: 3.7 G/DL (ref 3.2–4.6)
ALBUMIN/GLOB SERPL: 1.2 {RATIO} (ref 1.2–3.5)
ALP SERPL-CCNC: 57 U/L (ref 50–136)
ALT SERPL-CCNC: 28 U/L (ref 12–65)
ANION GAP SERPL CALC-SCNC: 6 MMOL/L (ref 7–16)
AST SERPL-CCNC: 19 U/L (ref 15–37)
BASOPHILS # BLD: 0 K/UL (ref 0–0.2)
BASOPHILS NFR BLD: 1 % (ref 0–2)
BILIRUB SERPL-MCNC: 0.9 MG/DL (ref 0.2–1.1)
BUN SERPL-MCNC: 26 MG/DL (ref 8–23)
CALCIUM SERPL-MCNC: 9.5 MG/DL (ref 8.3–10.4)
CHLORIDE SERPL-SCNC: 104 MMOL/L (ref 98–107)
CO2 SERPL-SCNC: 31 MMOL/L (ref 21–32)
CREAT SERPL-MCNC: 1.1 MG/DL (ref 0.8–1.5)
DIFFERENTIAL METHOD BLD: NORMAL
EOSINOPHIL # BLD: 0.2 K/UL (ref 0–0.8)
EOSINOPHIL NFR BLD: 4 % (ref 0.5–7.8)
ERYTHROCYTE [DISTWIDTH] IN BLOOD BY AUTOMATED COUNT: 12.1 % (ref 11.9–14.6)
GLOBULIN SER CALC-MCNC: 3.2 G/DL (ref 2.3–3.5)
GLUCOSE SERPL-MCNC: 108 MG/DL (ref 65–100)
HCT VFR BLD AUTO: 43.9 % (ref 41.1–50.3)
HGB BLD-MCNC: 14.7 G/DL (ref 13.6–17.2)
IMM GRANULOCYTES # BLD AUTO: 0 K/UL (ref 0–0.5)
IMM GRANULOCYTES NFR BLD AUTO: 0 % (ref 0–5)
LYMPHOCYTES # BLD: 2.3 K/UL (ref 0.5–4.6)
LYMPHOCYTES NFR BLD: 36 % (ref 13–44)
MCH RBC QN AUTO: 31.1 PG (ref 26.1–32.9)
MCHC RBC AUTO-ENTMCNC: 33.5 G/DL (ref 31.4–35)
MCV RBC AUTO: 92.8 FL (ref 79.6–97.8)
MONOCYTES # BLD: 0.5 K/UL (ref 0.1–1.3)
MONOCYTES NFR BLD: 8 % (ref 4–12)
NEUTS SEG # BLD: 3.3 K/UL (ref 1.7–8.2)
NEUTS SEG NFR BLD: 51 % (ref 43–78)
NRBC # BLD: 0 K/UL (ref 0–0.2)
PLATELET # BLD AUTO: 253 K/UL (ref 150–450)
PMV BLD AUTO: 9.7 FL (ref 9.4–12.3)
POTASSIUM SERPL-SCNC: 3.5 MMOL/L (ref 3.5–5.1)
PROT SERPL-MCNC: 6.9 G/DL (ref 6.3–8.2)
PSA SERPL-MCNC: 0.2 NG/ML
RBC # BLD AUTO: 4.73 M/UL (ref 4.23–5.67)
SODIUM SERPL-SCNC: 141 MMOL/L (ref 136–145)
WBC # BLD AUTO: 6.4 K/UL (ref 4.3–11.1)

## 2020-11-09 PROCEDURE — 84153 ASSAY OF PSA TOTAL: CPT

## 2020-11-09 PROCEDURE — 85025 COMPLETE CBC W/AUTO DIFF WBC: CPT

## 2020-11-09 PROCEDURE — 80053 COMPREHEN METABOLIC PANEL: CPT

## 2020-11-09 PROCEDURE — 36415 COLL VENOUS BLD VENIPUNCTURE: CPT

## 2021-11-15 ENCOUNTER — HOSPITAL ENCOUNTER (OUTPATIENT)
Dept: LAB | Age: 76
Discharge: HOME | End: 2021-11-15
Payer: MEDICARE

## 2021-11-15 DIAGNOSIS — C61 PROSTATE CANCER (HCC): ICD-10-CM

## 2021-11-15 LAB
ALBUMIN SERPL-MCNC: 3.8 G/DL (ref 3.2–4.6)
ALBUMIN/GLOB SERPL: 1.1 {RATIO} (ref 1.2–3.5)
ALP SERPL-CCNC: 66 U/L (ref 50–136)
ALT SERPL-CCNC: 28 U/L (ref 12–65)
ANION GAP SERPL CALC-SCNC: 6 MMOL/L (ref 7–16)
AST SERPL-CCNC: 21 U/L (ref 15–37)
BASOPHILS # BLD: 0.1 K/UL (ref 0–0.2)
BASOPHILS NFR BLD: 1 % (ref 0–2)
BILIRUB SERPL-MCNC: 1.4 MG/DL (ref 0.2–1.1)
BUN SERPL-MCNC: 25 MG/DL (ref 8–23)
CALCIUM SERPL-MCNC: 9.3 MG/DL (ref 8.3–10.4)
CHLORIDE SERPL-SCNC: 105 MMOL/L (ref 98–107)
CO2 SERPL-SCNC: 30 MMOL/L (ref 21–32)
CREAT SERPL-MCNC: 1.2 MG/DL (ref 0.8–1.5)
DIFFERENTIAL METHOD BLD: NORMAL
EOSINOPHIL # BLD: 0.1 K/UL (ref 0–0.8)
EOSINOPHIL NFR BLD: 1 % (ref 0.5–7.8)
ERYTHROCYTE [DISTWIDTH] IN BLOOD BY AUTOMATED COUNT: 12.9 % (ref 11.9–14.6)
GLOBULIN SER CALC-MCNC: 3.4 G/DL (ref 2.3–3.5)
GLUCOSE SERPL-MCNC: 109 MG/DL (ref 65–100)
HCT VFR BLD AUTO: 45.3 %
HGB BLD-MCNC: 15 G/DL (ref 13.6–17.2)
IMM GRANULOCYTES # BLD AUTO: 0 K/UL (ref 0–0.5)
IMM GRANULOCYTES NFR BLD AUTO: 0 % (ref 0–5)
LYMPHOCYTES # BLD: 2 K/UL (ref 0.5–4.6)
LYMPHOCYTES NFR BLD: 29 % (ref 13–44)
MCH RBC QN AUTO: 30.5 PG (ref 26.1–32.9)
MCHC RBC AUTO-ENTMCNC: 33.1 G/DL (ref 31.4–35)
MCV RBC AUTO: 92.1 FL (ref 79.6–97.8)
MONOCYTES # BLD: 0.5 K/UL (ref 0.1–1.3)
MONOCYTES NFR BLD: 8 % (ref 4–12)
NEUTS SEG # BLD: 4.2 K/UL (ref 1.7–8.2)
NEUTS SEG NFR BLD: 61 % (ref 43–78)
NRBC # BLD: 0 K/UL (ref 0–0.2)
PLATELET # BLD AUTO: 283 K/UL (ref 150–450)
PMV BLD AUTO: 9.5 FL (ref 9.4–12.3)
POTASSIUM SERPL-SCNC: 3.5 MMOL/L (ref 3.5–5.1)
PROT SERPL-MCNC: 7.2 G/DL (ref 6.3–8.2)
PSA SERPL-MCNC: 0.6 NG/ML
RBC # BLD AUTO: 4.92 M/UL (ref 4.23–5.6)
SODIUM SERPL-SCNC: 141 MMOL/L (ref 136–145)
WBC # BLD AUTO: 6.9 K/UL (ref 4.3–11.1)

## 2021-11-15 PROCEDURE — 85025 COMPLETE CBC W/AUTO DIFF WBC: CPT

## 2021-11-15 PROCEDURE — 84153 ASSAY OF PSA TOTAL: CPT

## 2021-11-15 PROCEDURE — 80053 COMPREHEN METABOLIC PANEL: CPT

## 2021-11-15 PROCEDURE — 36415 COLL VENOUS BLD VENIPUNCTURE: CPT

## 2022-03-19 PROBLEM — C61 PROSTATE CANCER (HCC): Status: ACTIVE | Noted: 2017-01-17

## 2022-05-26 ENCOUNTER — TELEPHONE (OUTPATIENT)
Dept: ONCOLOGY | Age: 77
End: 2022-05-26

## 2022-05-26 RX ORDER — CITALOPRAM 20 MG/1
20 TABLET ORAL DAILY
Qty: 90 TABLET | Refills: 1 | Status: SHIPPED | OUTPATIENT
Start: 2022-05-26 | End: 2022-10-10

## 2022-05-26 NOTE — TELEPHONE ENCOUNTER
Received message from Moises FELIX Se. Stating patient will be doing a PET scan at an outside facility ([facility]) and may need a PA. Called Medicare to find out if a PA was needed for this PET scan and spoke to Dev ([id number]). She said a PA was not needed. She said once facility submits claim, they will be able to determine if it was medically necessary and go from there. Tried calling patient to let him know and he didn't have VM set up at this time. I will attempt to call later or send him a [employer] message. Nurse notified.

## 2022-05-31 DIAGNOSIS — M17.11 ARTHRITIS OF RIGHT KNEE: Primary | ICD-10-CM

## 2022-05-31 RX ORDER — CELECOXIB 200 MG/1
200 CAPSULE ORAL 2 TIMES DAILY
Qty: 180 CAPSULE | Refills: 0 | Status: SHIPPED | OUTPATIENT
Start: 2022-05-31 | End: 2022-10-05 | Stop reason: SDUPTHER

## 2022-06-09 ENCOUNTER — TELEPHONE (OUTPATIENT)
Dept: ONCOLOGY | Age: 77
End: 2022-06-09

## 2022-06-09 NOTE — TELEPHONE ENCOUNTER
[phone] to find out if patient had scheduled his PET scan and they said he hasn't. They tried to get a hold of him to schedule but haven't been able to. Called patient to have him call [facility] @[phone] to schedule his PET scan, but there was no answer and no VM was set up. I will continue to follow up.

## 2022-06-13 ENCOUNTER — TELEPHONE (OUTPATIENT)
Dept: ONCOLOGY | Age: 77
End: 2022-06-13

## 2022-06-13 NOTE — TELEPHONE ENCOUNTER
Called patient to find out if he had schedule his radiology appt and there was no answer and VM not set up in either number  [phone] (T)   [phone]     I will continue to try.

## 2022-06-13 NOTE — TELEPHONE ENCOUNTER
Received call from patient stating that he received [employer] message stating that his appt with Dr. Judi Castrejon tomorrow needed to be cancelled and rescheduled after he completed his PET scan. I told him that [facility]) tried to reach him to schedule an appt and couldn't reach him. I told him I also tried to call him and couldn't reach him or leave a message. He was very apologetic and proceeded to tell me that I can go ahead and schedule the appt for him and that he is available anytime any day. I asked him to confirm his insurance and he said he has medicare as primary and [employer] as secondary. I told him that I had called medicare to find out if he needed pre-certification for scan and they said no. I asked him to give me the secondary insurance information so I can call and verify if he needs pre-cert. I obtained information and told him I will give him a call back with appointment information once I obtained pre-cert etc. He voiced understanding and was very appreciative of my help.

## 2022-07-07 ENCOUNTER — CLINICAL DOCUMENTATION (OUTPATIENT)
Dept: ONCOLOGY | Age: 77
End: 2022-07-07

## 2022-07-07 DIAGNOSIS — C61 PROSTATE CANCER (HCC): Primary | ICD-10-CM

## 2022-07-07 NOTE — PROGRESS NOTES
Attempted to call pt, no answer, lvm for pt. Asked him to cb to let us know if he was able to have his scan done prior to appointment on Monday with Dr Danni Rivas. Instructed pt to call back either way, if he had the scan or not, if no scan was done we will have to r/s ov with Alberto COFFMAN

## 2022-07-11 ENCOUNTER — HOSPITAL ENCOUNTER (OUTPATIENT)
Dept: LAB | Age: 77
Discharge: HOME | End: 2022-07-14
Payer: MEDICARE

## 2022-07-11 ENCOUNTER — OFFICE VISIT (OUTPATIENT)
Dept: ONCOLOGY | Age: 77
End: 2022-07-11
Payer: MEDICARE

## 2022-07-11 VITALS
RESPIRATION RATE: 16 BRPM | BODY MASS INDEX: 22.74 KG/M2 | TEMPERATURE: 98.1 F | SYSTOLIC BLOOD PRESSURE: 115 MMHG | OXYGEN SATURATION: 98 % | HEART RATE: 66 BPM | WEIGHT: 153.5 LBS | HEIGHT: 69 IN | DIASTOLIC BLOOD PRESSURE: 84 MMHG

## 2022-07-11 DIAGNOSIS — C61 PROSTATE CANCER (HCC): Primary | ICD-10-CM

## 2022-07-11 DIAGNOSIS — C79.51 METASTASIS TO BONE (HCC): ICD-10-CM

## 2022-07-11 DIAGNOSIS — C61 PROSTATE CANCER (HCC): ICD-10-CM

## 2022-07-11 LAB
ALBUMIN SERPL-MCNC: 3.7 G/DL (ref 3.2–4.6)
ALBUMIN/GLOB SERPL: 1.2 {RATIO} (ref 1.2–3.5)
ALP SERPL-CCNC: 58 U/L (ref 50–136)
ALT SERPL-CCNC: 21 U/L (ref 12–65)
ANION GAP SERPL CALC-SCNC: 7 MMOL/L (ref 7–16)
AST SERPL-CCNC: 13 U/L (ref 15–37)
BASOPHILS # BLD: 0 K/UL (ref 0–0.2)
BASOPHILS NFR BLD: 1 % (ref 0–2)
BILIRUB SERPL-MCNC: 1.5 MG/DL (ref 0.2–1.1)
BUN SERPL-MCNC: 26 MG/DL (ref 8–23)
CALCIUM SERPL-MCNC: 9.4 MG/DL (ref 8.3–10.4)
CHLORIDE SERPL-SCNC: 104 MMOL/L (ref 98–107)
CO2 SERPL-SCNC: 30 MMOL/L (ref 21–32)
CREAT SERPL-MCNC: 1.2 MG/DL (ref 0.8–1.5)
DIFFERENTIAL METHOD BLD: NORMAL
EOSINOPHIL # BLD: 0.1 K/UL (ref 0–0.8)
EOSINOPHIL NFR BLD: 2 % (ref 0.5–7.8)
ERYTHROCYTE [DISTWIDTH] IN BLOOD BY AUTOMATED COUNT: 13.2 % (ref 11.9–14.6)
GLOBULIN SER CALC-MCNC: 3.1 G/DL (ref 2.3–3.5)
GLUCOSE SERPL-MCNC: 104 MG/DL (ref 65–100)
HCT VFR BLD AUTO: 44.8 %
HGB BLD-MCNC: 14.8 G/DL (ref 13.6–17.2)
IMM GRANULOCYTES # BLD AUTO: 0 K/UL (ref 0–0.5)
IMM GRANULOCYTES NFR BLD AUTO: 1 % (ref 0–5)
LYMPHOCYTES # BLD: 2.6 K/UL (ref 0.5–4.6)
LYMPHOCYTES NFR BLD: 41 % (ref 13–44)
MCH RBC QN AUTO: 31.5 PG (ref 26.1–32.9)
MCHC RBC AUTO-ENTMCNC: 33 G/DL (ref 31.4–35)
MCV RBC AUTO: 95.3 FL (ref 79.6–97.8)
MONOCYTES # BLD: 0.5 K/UL (ref 0.1–1.3)
MONOCYTES NFR BLD: 8 % (ref 4–12)
NEUTS SEG # BLD: 3 K/UL (ref 1.7–8.2)
NEUTS SEG NFR BLD: 47 % (ref 43–78)
NRBC # BLD: 0 K/UL (ref 0–0.2)
PLATELET # BLD AUTO: 253 K/UL (ref 150–450)
PMV BLD AUTO: 9.4 FL (ref 9.4–12.3)
POTASSIUM SERPL-SCNC: 3.5 MMOL/L (ref 3.5–5.1)
PROT SERPL-MCNC: 6.8 G/DL (ref 6.3–8.2)
PSA SERPL-MCNC: 2.5 NG/ML
RBC # BLD AUTO: 4.7 M/UL (ref 4.23–5.6)
SODIUM SERPL-SCNC: 141 MMOL/L (ref 136–145)
WBC # BLD AUTO: 6.3 K/UL (ref 4.3–11.1)

## 2022-07-11 PROCEDURE — 84153 ASSAY OF PSA TOTAL: CPT

## 2022-07-11 PROCEDURE — 1123F ACP DISCUSS/DSCN MKR DOCD: CPT | Performed by: INTERNAL MEDICINE

## 2022-07-11 PROCEDURE — G8428 CUR MEDS NOT DOCUMENT: HCPCS | Performed by: INTERNAL MEDICINE

## 2022-07-11 PROCEDURE — 80053 COMPREHEN METABOLIC PANEL: CPT

## 2022-07-11 PROCEDURE — 36415 COLL VENOUS BLD VENIPUNCTURE: CPT

## 2022-07-11 PROCEDURE — 85025 COMPLETE CBC W/AUTO DIFF WBC: CPT

## 2022-07-11 PROCEDURE — 1036F TOBACCO NON-USER: CPT | Performed by: INTERNAL MEDICINE

## 2022-07-11 PROCEDURE — G8420 CALC BMI NORM PARAMETERS: HCPCS | Performed by: INTERNAL MEDICINE

## 2022-07-11 PROCEDURE — 3017F COLORECTAL CA SCREEN DOC REV: CPT | Performed by: INTERNAL MEDICINE

## 2022-07-11 PROCEDURE — 99214 OFFICE O/P EST MOD 30 MIN: CPT | Performed by: INTERNAL MEDICINE

## 2022-07-11 RX ORDER — POTASSIUM CHLORIDE 750 MG/1
10 TABLET, EXTENDED RELEASE ORAL 2 TIMES DAILY
COMMUNITY
Start: 2022-06-01 | End: 2023-06-01

## 2022-07-11 RX ORDER — EPINEPHRINE 1 MG/ML
0.3 INJECTION, SOLUTION, CONCENTRATE INTRAVENOUS PRN
Status: CANCELLED | OUTPATIENT
Start: 2022-07-18

## 2022-07-11 RX ORDER — ACETAMINOPHEN 325 MG/1
650 TABLET ORAL
Status: CANCELLED | OUTPATIENT
Start: 2022-07-18

## 2022-07-11 RX ORDER — SODIUM CHLORIDE 9 MG/ML
INJECTION, SOLUTION INTRAVENOUS CONTINUOUS
Status: CANCELLED | OUTPATIENT
Start: 2022-07-18

## 2022-07-11 RX ORDER — FAMOTIDINE 10 MG/ML
20 INJECTION, SOLUTION INTRAVENOUS
Status: CANCELLED | OUTPATIENT
Start: 2022-07-18

## 2022-07-11 RX ORDER — ALBUTEROL SULFATE 90 UG/1
4 AEROSOL, METERED RESPIRATORY (INHALATION) PRN
Status: CANCELLED | OUTPATIENT
Start: 2022-07-18

## 2022-07-11 RX ORDER — ONDANSETRON 2 MG/ML
8 INJECTION INTRAMUSCULAR; INTRAVENOUS
Status: CANCELLED | OUTPATIENT
Start: 2022-07-18

## 2022-07-11 RX ORDER — FLUTICASONE PROPIONATE 50 MCG
2 SPRAY, SUSPENSION (ML) NASAL DAILY
COMMUNITY
Start: 2022-06-01 | End: 2023-06-01

## 2022-07-11 RX ORDER — DIPHENHYDRAMINE HYDROCHLORIDE 50 MG/ML
50 INJECTION INTRAMUSCULAR; INTRAVENOUS
Status: CANCELLED | OUTPATIENT
Start: 2022-07-18

## 2022-07-11 RX ORDER — FAMOTIDINE 40 MG/1
40 TABLET, FILM COATED ORAL DAILY
COMMUNITY
Start: 2021-11-30 | End: 2022-11-30

## 2022-07-11 ASSESSMENT — PATIENT HEALTH QUESTIONNAIRE - PHQ9
1. LITTLE INTEREST OR PLEASURE IN DOING THINGS: 0
2. FEELING DOWN, DEPRESSED OR HOPELESS: 0
SUM OF ALL RESPONSES TO PHQ QUESTIONS 1-9: 0
SUM OF ALL RESPONSES TO PHQ9 QUESTIONS 1 & 2: 0
SUM OF ALL RESPONSES TO PHQ QUESTIONS 1-9: 0

## 2022-07-11 NOTE — PATIENT INSTRUCTIONS
Patient Instructions from Today's Visit    Reason for Visit:  Follow up - Prostate    Diagnosis Information:  [email or web address]      Plan:  - We will get you set up for lupron injections. Delmas Kussmaul        Generic name: Denosumab    Trade names:  [facility]    Denosumab is the generic name for the trade name drugs Prolia® or Julieanne Saber. In some cases, health care professionals may use the trade names Prolia® or Julieanne Saber when referring to the generic drug name denosumab. Prolia® and Xgeva® are the same generic drug (denosumab). They were given distinct trade names in order to differentiate between their unique dosing schedules and indications for use. How they are used is different, but strictly speaking, the two are the same drug. Drug type:     Delmas Kussmaul is a monoclonal antibody that works as a RANK ligand (RANKL) inhibitor. This medication is classified as a \"bone-modifying agent\". (For more detail see \"How denosumab works\" section below). What Xgeva Is Used For:  Prevention of skeletal-related events (need for radiation, fracture due to cancer in the bone, surgery to the bone, or compression of the spinal cord) in patients with multiple myeloma and bone metastases from solid tumors. Treatment of giant cell tumor of the bone. Note: If a drug has been approved for one use, physicians sometimes elect to use this same drug for other problems if they believe it might be helpful. How Delmas Kussmaul Is Given:  As a subcutaneous injection in the upper arm, upper thigh, or abdomen. A subcutaneous injection is a shot into the layer of skin directly below the outer skin layer. There is no pill form of Xgeva. The amount of Delmas Kussmaul you will receive depends on many factors, including your general health or other health problems, and the type of cancer or condition you have. Your doctor will determine your dose and schedule.    Side Effects:  Important things to remember about the side effects of Xgeva:    Most people will not experience all of the Xgeva side effects listed. Xgeva side effects are often predictable in terms of their onset, duration and severity. Xgeva side effects will likely improve after therapy is complete. Xgeva side effects may be quite manageable. There are many options to minimize of prevent the side effects of Xgeva. The following are common (occurring in greater than 30%) side effects for patients taking Xgeva: Fatigue  Muscle weakness  Decreased levels of phosphorus in your blood (hypophosphatemia)  Nausea  These are less common side effects (occurring in 10-29%) for patients receiving Xgeva:    Diarrhea  Shortness of breath  Low levels of calcium in your blood (hypocalcemia). Cough  Joint pain  Headache  Limb pain  Back pain  Eczema (a skin condition that may include any of the following: redness, skin swelling, itching and dryness, crusting, blistering or bleeding). Rash  Osteonecrosis of the jaw has been reported rarely in patients with cancer receiving treatment regimens that include bone modifying agents. Many of the reported cases were associated with dental procedures, such as removal of a tooth. A dental examination with appropriate preventative dentistry should be considered prior to treatment with denosumab, particularly in patients with additional risk factors (ie cancer, chemotherapy, corticoseroids, poor oral hygiene). Invasive dental procedures should be avoided during treatment. Not all side effects are listed above, some that are rare (occurring in less than 10% of patients) are not listed here. However, you should always inform your health care provider if you experience any unusual symptoms.     When to contact your doctor or health care provider:  Contact your health care provider immediately, day or night, if you should experience the following:    Difficulty breathing  Chest reactions in nursing infants from [facility], a decision should be made whether to discontinue nursing or discontinue the drug taking into account the importance of the drug to the mother. Self-Care Tips:  Take a calcium and vitamin D supplement as necessary to treat and/or prevent low blood calcium levels. Go for blood tests as ordered by your provider. Perform proper, thorough oral hygiene and routine dental care. Inform your dentist that you are being treated with denosumab. Avoid invasive dental procedures. Inform your physician or dentist if you experience persistent pain and/or slow healing of the mouth or jaw following invasive dental procedures. Drink at least 2 to 3 quarts of fluid every 24 hours, unless you are instructed otherwise, as it is important to avoid becoming dehydrated. Acetaminophen may help relieve discomfort from fever, headache and generalized aches and pains, however talk with your provider prior to taking it. To reduce nausea, take anti-nausea medications as prescribed by your doctor, and eat small, frequent meals. Eat foods that may help reduce diarrhea (see managing side effects - diarrhea) and if necessary, follow the regimen of anti-diarrhea medication as prescribed by your health care professional.  Use mild, unscented soaps, laundry detergents, and lotions to avoid irritating your skin. Use lotion liberally to keep skin moisturized and prevent cracking. (see managing side effects - dry skin)  Get plenty of rest.  Maintain good nutrition. If you experience symptoms or side effects, be sure to discuss them with your health care team. They can prescribe medications and/or offer other suggestions that are effective in managing such problems. Monitoring and Testing: You will be checked regularly by your doctor while you are taking Xgeva, to monitor side effects and check your response to therapy.   Periodic blood work will be obtained to monitor the function of your organs (such as your kidneys and liver), as deemed necessary by your doctor. How Ok Reginaldo Works:  Cancer cells that spread to the bone can secrete substances that can cause cells in the bone called osteoclasts to dissolve or \"eat away\" a portion of the bone. The process, during which osteoclasts break down bone and then release those minerals such as calcium from bone fluid into the blood, is called \"bone resorption\". These tumors or lesions in the bone weaken the bone and can lead to complications, referred to as \"skeletal related events. \" Some of the complications (skeletal related events) that can result from this bone breakdown are bone pain, fractures, and the need for additional procedures such as radiation therapy to reduce pain or surgery to fix or stabilize an affected bone. Bone resorption also releases growth factors that may cause growth of tumors. Receptor activator of nuclear factor kappa-B ligand (RANKL) is a type of protein that is important in bone metabolism. This natural and necessary protein is found on oteoblasts (cells that are responsible for bone formation) and serves to activate osteoclasts (cells involved in bone resorption - as described above). By inhibiting RANKL we may be able to decrease bone resorption and therefore decrease bone loss and hypercalcemia. Xgeva binds to RANKL, a protein that is essential for the formation, function and survival of osteoclasts, the cells responsible for bone resorption. Xgeva inhibits osteoclast formation, function and survival thereby, decreasing bone resorption and increasing bone mass and strength of the bone. Note: We strongly encourage you to talk with your health care professional about your specific medical condition and treatments. The information contained in this website is meant to be helpful and educational, but is not a substitute for medical advice.         Darolutamide        (JEZ-uz-AJF-ta-mide)    Trade Name(s): Cy Grebe    Darolutamide is the generic name for the trade name drug Cy Grebe. In some cases, health care professionals may use the trade name Cy Grebe when referring to the generic drug name Darolutamide. Drug Type: Darolutamide is the anti-androgen medication. This medication is classified as an \"androgen receptor inhibitor\" (for more detail, see \"How Darolutamide Works\" below). What Darolutamide Is Used For  Darolutamide is used to treat non-metastatic, castration-resistant prostate cancer. This type of cancer is one that has not spread beyond the prostate and is resistant to medical or surgical treatments that lower testosterone. Note: If a drug has been approved for one use, physicians may elect to use this same drug for other problems if they believe it may be helpful. How Darolutamide Is Given  Darolutamide is a tablet that is taken by mouth twice daily with food. You should take Darolutamide at the same times each day. Swallow tablets whole. If you miss a dose, take it as soon as you remember prior to the next scheduled dose. Do not take 2 doses at once to make up a missed dose. If you are not sure what to do, contact your health care provider. You should not stop taking Darolutamide without speaking with your doctor first.  Darolutamide is commonly given after a surgical procedure where both testicles are removed or is given in combination with another class of medications known as gonadotropin releasing hormone analogs. Side Effects  Important things to remember about the side effects of Darolutamide:    Most people will not experience all of the side effects listed. Side effects are often predictable in terms of their onset, duration, and severity. Side effects will improve after therapy is complete. Many of the side effects may be manageable. There are many options to minimize or prevent the side effects of Darolutamide.   The following side effects are common (occurring in greater than 10%) for patients taking Darolutamide:    Fatigue  Low white blood cell counts (neutropenia)  Elevated blood liver enzymes  Loss of fertility in males. Meaning, your ability to conceive or father a child may be affected by Darolutamide. Discuss this issue with your health care provider. These are less common side effects (occurring in 5-10%) for patients receiving Darolutamide:    Joint pain (usually back, arm, or leg pain)  High blood pressure  Constipation  Diarrhea  Nausea  Hot flashes  Not all side effects are listed above. Side effects that are very rare are not listed here. But you should always inform your health care provider if you experience any unusual symptoms. When to Contact Your Doctor or [facility] Provider  Contact your health care provider immediately, day or night, if you should experience any of the following symptoms:    Fever of 100.4º F (38º C) or higher, chills (possible signs of infection)  The following symptoms require medical attention, but are not an emergency. Contact your health care provider within 24 hours of noticing any of the following:    Nausea (interferes with ability to eat and unrelieved with prescribed medication)  Vomiting (vomiting more than 4-5 times in a 24-hour period)  Diarrhea (4-6 episodes in a 24-hour period)  Black or tarry stools, or blood in your stools  Blood in the urine  Pain or burning with urination  Extreme fatigue (unable to carry on self-care activities)  Always inform your health care provider if you experience any unusual symptoms. Precautions  Before starting Darolutamide treatment, make sure you tell your doctor about any other medications you are taking (including prescription, over-the-counter, vitamins, herbal remedies, etc.). Inform your health care professional if you and your partner are pregnant or may be pregnant prior to starting this treatment.   Use contraceptives, and do not conceive a child (get pregnant) while taking Darolutamide. Barrier methods of contraception, such as condoms, are recommended during treatment and for up to 1 week after last dose of Darolutamide. Androgen receptor inhibitors, like Darolutamide, are usually only given to men. However, if Darolutamide is given to a woman, she should not conceive a child or breastfeed while taking this medication. Darolutamide has not been studied in women. Based on the way Darolutamide works, it is believed that this medication can cause fetal harm and loss of pregnancy when given to a pregnant female. Self-Care Tips  Drink at least two to three quarts of fluid every 24 hours, unless you are instructed otherwise. You may be at risk of infection so try to avoid crowds or people with colds, and report fever or any other signs of infection immediately to your health care provider. Wash your hands often. To reduce nausea, take anti-nausea medications as prescribed by your doctor, and eat small, frequent meals. Follow regimen of anti-diarrhea medication as prescribed by your health care professional.  Eat foods that may help reduce diarrhea (see managing side effects - diarrhea). Get plenty of rest.  Maintain good nutrition. Remain active as you are able. Gentle exercise is encouraged such as a daily walk. If you experience symptoms or side effects, be sure to discuss them with your health care team. They can prescribe medications and/or offer other suggestions that are effective in managing such problems. Monitoring and Testing While Taking Darolutamide  You will be checked regularly by your doctor while you are taking Darolutamide to monitor side effects and check your response to therapy. Periodic blood work will be obtained to monitor your complete blood count (CBC) as well as the function of other organs (such as your kidneys and liver) will also be ordered by your doctor.     How Darolutamide Works  Androgen receptors are normally found in prostate cells and are responsible for finding the various hormones (androgens, specifically testosterone) and producing various effects on the body. Darolutamide is an androgen receptor inhibitor. Androgen receptor inhibitors work by targeting the hormones from activating chemical pathways within the body that could lead to rapid division of cancer cells. Essentially, this \"starves\" the cancer cells of the hormones they need to grow and divide. Androgen receptor inhibitors are often used in combination with gonadotropin releasing hormone analogs to prevent cancer cells from growing and dividing rapidly. Note: We strongly encourage you to talk with your health care professionals about your specific medical condition and treatments. The information contained in this website is meant to be helpful and educational, but is not a substitute for medical advice.       Follow Up:  - next week for lurpon  - next month for follow up    Recent Lab Results:  Hospital Outpatient Visit on 07/11/2022   Component Date Value Ref Range Status    WBC 07/11/2022 6.3  4.3 - 11.1 K/uL Final    RBC 07/11/2022 4.70  4.23 - 5.6 M/uL Final    Hemoglobin 07/11/2022 14.8  13.6 - 17.2 g/dL Final    Hematocrit 07/11/2022 44.8  % Final    MCV 07/11/2022 95.3  79.6 - 97.8 FL Final    MCH 07/11/2022 31.5  26.1 - 32.9 PG Final    MCHC 07/11/2022 33.0  31.4 - 35.0 g/dL Final    RDW 07/11/2022 13.2  11.9 - 14.6 % Final    Platelets 48/24/0013 253  150 - 450 K/uL Final    MPV 07/11/2022 9.4  9.4 - 12.3 FL Final    nRBC 07/11/2022 0.00  0.0 - 0.2 K/uL Final    **Note: Absolute NRBC parameter is now reported with Hemogram**    Seg Neutrophils 07/11/2022 47  43 - 78 % Final    Lymphocytes 07/11/2022 41  13 - 44 % Final    Monocytes 07/11/2022 8  4.0 - 12.0 % Final    Eosinophils % 07/11/2022 2  0.5 - 7.8 % Final    Basophils 07/11/2022 1  0.0 - 2.0 % Final    Immature Granulocytes 07/11/2022 1  0.0 - 5.0 % Final    Segs Absolute 07/11/2022 3.0  1.7 - 8.2

## 2022-07-11 NOTE — PROGRESS NOTES
rad onc  referral for consideration of local therapy. This was completed in May 2017 and subsequently PSA dropped to 0, Lupron was continued until October 2018. In May 2022 we agreed to check PSMA PET because his PSA had been slowly increasing. Here for follow-up of PSMA PET results. No complaints, he remains very active with ECOG PS 0. No bone pain or urinary symptoms. Review of Systems:  Constitutional: Negative. HENT: Negative. Eyes: Negative. Respiratory: Negative. Cardiovascular: Negative. Gastrointestinal: Negative. Genitourinary: Negative. Musculoskeletal: Negative. Skin: Negative. Neurological: Negative. Endo/Heme/Allergies: Negative. Psychiatric/Behavioral: Negative. All other systems reviewed and are negative. Allergies   Allergen Reactions    Ciprofloxacin Other (See Comments)     Past Medical History:   Diagnosis Date    Anxiety     Arthritis     Elevated prostate specific antigen (PSA) 1/15/2014    Former cigar smoker     GERD (gastroesophageal reflux disease)     Hypertension     Hypertrophy of prostate with urinary obstruction and other lower urinary tract symptoms (LUTS) 1/15/2014    Prostate cancer [facility])      Past Surgical History:   Procedure Laterality Date    COLONOSCOPY      MALIGNANT SKIN LESION EXCISION      squamous cell - 2-3 times    PROSTATE BIOPSY, NEEDLE, SATURATION SAMPLING      TONSILLECTOMY  as child     Family History   Problem Relation Age of Onset    Heart Disease Father         pacemaker in his late [de-identified]     Social History     Socioeconomic History    Marital status:      Spouse name: Not on file    Number of children: Not on file    Years of education: Not on file    Highest education level: Not on file   Occupational History    Not on file   Tobacco Use    Smoking status: Former Smoker    Smokeless tobacco: Never Used   Substance and Sexual Activity    Alcohol use:  Yes     Alcohol/week: 6.0 - 8.0 standard drinks    Drug use: No    Sexual activity: Not on file   Other Topics Concern    Not on file   Social History Narrative    Not on file     Social Determinants of Health     Financial Resource Strain:     Difficulty of Paying Living Expenses: Not on file   Food Insecurity:     Worried About Running Out of Food in the Last Year: Not on file    Guilherme of Food in the Last Year: Not on file   Transportation Needs:     Lack of Transportation (Medical): Not on file    Lack of Transportation (Non-Medical):  Not on file   Physical Activity:     Days of Exercise per Week: Not on file    Minutes of Exercise per Session: Not on file   Stress:     Feeling of Stress : Not on file   Social Connections:     Frequency of Communication with Friends and Family: Not on file    Frequency of Social Gatherings with Friends and Family: Not on file    Attends Mandaen Services: Not on file    Active Member of [employer] Group or Organizations: Not on file    Attends Club or Organization Meetings: Not on file    Marital Status: Not on file   Intimate Partner Violence:     Fear of Current or Ex-Partner: Not on file    Emotionally Abused: Not on file    Physically Abused: Not on file    Sexually Abused: Not on file   Housing Stability:     Unable to Pay for Housing in the Last Year: Not on file    Number of [place] in the Last Year: Not on file    Unstable Housing in the Last Year: Not on file     Current Outpatient Medications   Medication Sig Dispense Refill    fluticasone (FLONASE) 50 MCG/ACT nasal spray 2 sprays by Nasal route daily      potassium chloride (KLOR-CON M) 10 MEQ extended release tablet Take 10 mEq by mouth 2 times daily      famotidine (PEPCID) 40 MG tablet Take 40 mg by mouth daily      celecoxib (CELEBREX) 200 MG capsule Take 1 capsule by mouth 2 times daily 180 capsule 0    citalopram (CELEXA) 20 mg tablet Take 1 tablet by mouth daily 90 tablet 1    chlorthalidone (HYGROTON) 25 MG tablet Take 25 mg by mouth daily      LORazepam (ATIVAN) 1 MG tablet Take 0.5 mg by mouth 2 times daily as needed.  zolpidem (AMBIEN) 10 MG tablet Take 10 mg by mouth nightly.  aspirin 81 MG EC tablet Take by mouth      celecoxib (CELEBREX) 200 MG capsule Take 200 mg by mouth 2 times daily (Patient not taking: Reported on 7/11/2022)      zolpidem (AMBIEN) 5 MG tablet Take by mouth. (Patient not taking: Reported on 7/11/2022)       No current facility-administered medications for this visit. OBJECTIVE:  /84 (Site: Right Upper Arm, Position: Sitting, Cuff Size: Medium Adult)   Pulse 66   Temp 98.1 °F (36.7 °C) (Oral)   Resp 16   Ht 5' 9\" (1.753 m)   Wt 153 lb 8 oz (69.6 kg)   SpO2 98%   BMI 22.67 kg/m²     Physical Exam:  Constitutional: Well developed, well nourished male in no acute distress, sitting comfortably on the examination table. HEENT: Normocephalic and atraumatic. Sclerae anicteric. Skin Warm and dry. No bruising and no rash noted. No erythema. No pallor. Cardiopulmonary Deferred. Neuro Grossly nonfocal with no obvious sensory or motor deficits. MSK Normal range of motion in general.  No edema and no tenderness. Psych Appropriate mood and affect.          Labs:  Recent Results (from the past 96 hour(s))   CBC with Auto Differential    Collection Time: 07/11/22  8:50 AM   Result Value Ref Range    WBC 6.3 4.3 - 11.1 K/uL    RBC 4.70 4.23 - 5.6 M/uL    Hemoglobin 14.8 13.6 - 17.2 g/dL    Hematocrit 44.8 %    MCV 95.3 79.6 - 97.8 FL    MCH 31.5 26.1 - 32.9 PG    MCHC 33.0 31.4 - 35.0 g/dL    RDW 13.2 11.9 - 14.6 %    Platelets 143 633 - 364 K/uL    MPV 9.4 9.4 - 12.3 FL    nRBC 0.00 0.0 - 0.2 K/uL    Seg Neutrophils 47 43 - 78 %    Lymphocytes 41 13 - 44 %    Monocytes 8 4.0 - 12.0 %    Eosinophils % 2 0.5 - 7.8 %    Basophils 1 0.0 - 2.0 %    Immature Granulocytes 1 0.0 - 5.0 %    Segs Absolute 3.0 1.7 - 8.2 K/UL    Absolute Lymph # 2.6 0.5 - 4.6 K/UL    Absolute Mono # 0.5 0.1 - 1.3 K/UL    Absolute Eos # 0.1 0.0 - 0.8 K/UL    Basophils Absolute 0.0 0.0 - 0.2 K/UL    Absolute Immature Granulocyte 0.0 0.0 - 0.5 K/UL    Differential Type AUTOMATED         Imaging:  No results found. ASSESSMENT:   Diagnosis Orders   1. Prostate cancer ([address]  CBC with Auto Differential    Comprehensive Metabolic Panel    PSA, Diagnostic         PLAN:  Lab studies were personally reviewed. Prostate cancer:  Previously on watchful waiting with suspected indolent disease, but then repeat biopsy after PSA rise showed Ramah 4+5 disease September 2016, now with 2 suspicious bone lesions  and a single 1.7 cm iliac node, suspicious for metastasis. PSA was 11.9 prior to Lupron injection given on 10/3/16. I agree that the imaging findings are quite concerning for distant disease and that treatment is now warranted. Because of the ambivalent  nature of the imaging findings without a biopsy of distant disease, we favored restaging imaging after approximately 3 months. CT showed resolution of pelvic adenopathy but bone lesions are stable. PSA dropped to 0.6 consistent with excellent biochemical  response. He was having pain in the left hip still, so we obtained MRI of the left hip, which did not show bone metastases. Therefore, I believe the likelihood of these bone scan findings not representing metastases is quite high. He was reluctant to  consider any surgical options, so I would recommend rad onc referral for consideration of local therapy. Completed IMRT May 2017 and on ADT from October 2016-2018, with PSA dropping to 0 after therapy. In May 2022 we agreed to check PSMA PET because his PSA had been slowly increasing. Here for follow-up of PSMA PET results. No complaints, he remains very active with ECOG PS 0. No bone pain or urinary symptoms. Labs reviewed, PSA is up to 2.5 consistent with progressive disease.   PSMA from [facility] reviewed, this showed two bony metastases in T7 and the left occipital bone, consistent with M1 disease. He has been hesitant to restart ADT given the known AEs, if his PET had been negative I would have felt more comfortable with this approach. However, with the two osseous lesions I would give stronger consideration to resuming ADT and even considering addition of androgen signaling inhibitor. I would also recommend adding antiresorptive therapy for his bone mets. He is agreeable to Lupron but would like to think about addition of the other two classes, this is reasonable since most of the clinical trials started several weeks or months after castrate sensitivity was confirmed. He would like to try a 1 month Lupron shot for the time being. All questions were asked and answered to the best of my ability. F/u next week for Lupron, in 5 weeks for OV and further discussion of Xgeva and/or Nubeqa.            Dang Gomez MD, MD  [facility] Hematology and Oncology  [address] [place] [address]  Office : [phone]  Fax : [phone]

## 2022-07-18 ENCOUNTER — HOSPITAL ENCOUNTER (OUTPATIENT)
Dept: INFUSION THERAPY | Age: 77
Discharge: HOME | End: 2022-07-18
Payer: MEDICARE

## 2022-07-18 DIAGNOSIS — C61 PROSTATE CANCER (HCC): Primary | ICD-10-CM

## 2022-07-18 PROCEDURE — 96402 CHEMO HORMON ANTINEOPL SQ/IM: CPT

## 2022-07-18 PROCEDURE — 6360000002 HC RX W HCPCS: Performed by: INTERNAL MEDICINE

## 2022-07-18 RX ORDER — EPINEPHRINE 1 MG/ML
0.3 INJECTION, SOLUTION, CONCENTRATE INTRAVENOUS PRN
Status: CANCELLED | OUTPATIENT
Start: 2022-08-15

## 2022-07-18 RX ORDER — ALBUTEROL SULFATE 90 UG/1
4 AEROSOL, METERED RESPIRATORY (INHALATION) PRN
Status: CANCELLED | OUTPATIENT
Start: 2022-08-15

## 2022-07-18 RX ORDER — ACETAMINOPHEN 325 MG/1
650 TABLET ORAL
Status: CANCELLED | OUTPATIENT
Start: 2022-08-15

## 2022-07-18 RX ORDER — SODIUM CHLORIDE 9 MG/ML
INJECTION, SOLUTION INTRAVENOUS CONTINUOUS
Status: CANCELLED | OUTPATIENT
Start: 2022-08-15

## 2022-07-18 RX ORDER — DIPHENHYDRAMINE HYDROCHLORIDE 50 MG/ML
50 INJECTION INTRAMUSCULAR; INTRAVENOUS
Status: CANCELLED | OUTPATIENT
Start: 2022-08-15

## 2022-07-18 RX ORDER — ONDANSETRON 2 MG/ML
8 INJECTION INTRAMUSCULAR; INTRAVENOUS
Status: CANCELLED | OUTPATIENT
Start: 2022-08-15

## 2022-07-18 RX ADMIN — LEUPROLIDE ACETATE 7.5 MG: KIT at 09:18

## 2022-07-18 NOTE — PROGRESS NOTES
Arrived to the [facility]. Lupron completed. Patient tolerated well. Any issues or concerns during appointment: none. Patient aware of next infusion appointment on 8/15/22 at 0930. Patient instructed to call provider with temperature of 100.4 or greater or nausea/vomiting/ diarrhea or pain not controlled by medications. Discharged ambulatory.

## 2022-08-15 ENCOUNTER — HOSPITAL ENCOUNTER (OUTPATIENT)
Dept: INFUSION THERAPY | Age: 77
Discharge: HOME | End: 2022-08-15
Payer: MEDICARE

## 2022-08-15 ENCOUNTER — OFFICE VISIT (OUTPATIENT)
Dept: ONCOLOGY | Age: 77
End: 2022-08-15
Payer: MEDICARE

## 2022-08-15 ENCOUNTER — HOSPITAL ENCOUNTER (OUTPATIENT)
Dept: LAB | Age: 77
Discharge: HOME | End: 2022-08-18
Payer: MEDICARE

## 2022-08-15 VITALS
HEART RATE: 58 BPM | HEIGHT: 69 IN | OXYGEN SATURATION: 98 % | BODY MASS INDEX: 22.99 KG/M2 | WEIGHT: 155.2 LBS | DIASTOLIC BLOOD PRESSURE: 81 MMHG | SYSTOLIC BLOOD PRESSURE: 141 MMHG | RESPIRATION RATE: 17 BRPM | TEMPERATURE: 97.8 F

## 2022-08-15 DIAGNOSIS — C61 PROSTATE CANCER (HCC): Primary | ICD-10-CM

## 2022-08-15 DIAGNOSIS — C79.51 METASTASIS TO BONE (HCC): ICD-10-CM

## 2022-08-15 DIAGNOSIS — C61 PROSTATE CANCER (HCC): ICD-10-CM

## 2022-08-15 LAB
ALBUMIN SERPL-MCNC: 3.7 G/DL (ref 3.2–4.6)
ALBUMIN/GLOB SERPL: 1.2 {RATIO} (ref 1.2–3.5)
ALP SERPL-CCNC: 63 U/L (ref 50–136)
ALT SERPL-CCNC: 23 U/L (ref 12–65)
ANION GAP SERPL CALC-SCNC: 5 MMOL/L (ref 7–16)
AST SERPL-CCNC: 17 U/L (ref 15–37)
BASOPHILS # BLD: 0.1 K/UL (ref 0–0.2)
BASOPHILS NFR BLD: 1 % (ref 0–2)
BILIRUB SERPL-MCNC: 1 MG/DL (ref 0.2–1.1)
BUN SERPL-MCNC: 25 MG/DL (ref 8–23)
CALCIUM SERPL-MCNC: 9.4 MG/DL (ref 8.3–10.4)
CHLORIDE SERPL-SCNC: 107 MMOL/L (ref 98–107)
CO2 SERPL-SCNC: 30 MMOL/L (ref 21–32)
CREAT SERPL-MCNC: 1.4 MG/DL (ref 0.8–1.5)
DIFFERENTIAL METHOD BLD: NORMAL
EOSINOPHIL # BLD: 0.3 K/UL (ref 0–0.8)
EOSINOPHIL NFR BLD: 4 % (ref 0.5–7.8)
ERYTHROCYTE [DISTWIDTH] IN BLOOD BY AUTOMATED COUNT: 12.8 % (ref 11.9–14.6)
GLOBULIN SER CALC-MCNC: 3.1 G/DL (ref 2.3–3.5)
GLUCOSE SERPL-MCNC: 124 MG/DL (ref 65–100)
HCT VFR BLD AUTO: 46.2 %
HGB BLD-MCNC: 15 G/DL (ref 13.6–17.2)
IMM GRANULOCYTES # BLD AUTO: 0 K/UL (ref 0–0.5)
IMM GRANULOCYTES NFR BLD AUTO: 0 % (ref 0–5)
LYMPHOCYTES # BLD: 2.8 K/UL (ref 0.5–4.6)
LYMPHOCYTES NFR BLD: 38 % (ref 13–44)
MCH RBC QN AUTO: 30.8 PG (ref 26.1–32.9)
MCHC RBC AUTO-ENTMCNC: 32.5 G/DL (ref 31.4–35)
MCV RBC AUTO: 94.9 FL (ref 79.6–97.8)
MONOCYTES # BLD: 0.5 K/UL (ref 0.1–1.3)
MONOCYTES NFR BLD: 6 % (ref 4–12)
NEUTS SEG # BLD: 3.8 K/UL (ref 1.7–8.2)
NEUTS SEG NFR BLD: 51 % (ref 43–78)
NRBC # BLD: 0 K/UL (ref 0–0.2)
PLATELET # BLD AUTO: 256 K/UL (ref 150–450)
PMV BLD AUTO: 9.7 FL (ref 9.4–12.3)
POTASSIUM SERPL-SCNC: 3.8 MMOL/L (ref 3.5–5.1)
PROT SERPL-MCNC: 6.8 G/DL (ref 6.3–8.2)
PSA SERPL-MCNC: 1 NG/ML
RBC # BLD AUTO: 4.87 M/UL (ref 4.23–5.6)
SODIUM SERPL-SCNC: 142 MMOL/L (ref 136–145)
WBC # BLD AUTO: 7.5 K/UL (ref 4.3–11.1)

## 2022-08-15 PROCEDURE — 85025 COMPLETE CBC W/AUTO DIFF WBC: CPT

## 2022-08-15 PROCEDURE — 84153 ASSAY OF PSA TOTAL: CPT

## 2022-08-15 PROCEDURE — 96402 CHEMO HORMON ANTINEOPL SQ/IM: CPT

## 2022-08-15 PROCEDURE — 3017F COLORECTAL CA SCREEN DOC REV: CPT | Performed by: INTERNAL MEDICINE

## 2022-08-15 PROCEDURE — 99214 OFFICE O/P EST MOD 30 MIN: CPT | Performed by: INTERNAL MEDICINE

## 2022-08-15 PROCEDURE — 1036F TOBACCO NON-USER: CPT | Performed by: INTERNAL MEDICINE

## 2022-08-15 PROCEDURE — 80053 COMPREHEN METABOLIC PANEL: CPT

## 2022-08-15 PROCEDURE — 6360000002 HC RX W HCPCS: Performed by: INTERNAL MEDICINE

## 2022-08-15 PROCEDURE — 36415 COLL VENOUS BLD VENIPUNCTURE: CPT

## 2022-08-15 PROCEDURE — G8428 CUR MEDS NOT DOCUMENT: HCPCS | Performed by: INTERNAL MEDICINE

## 2022-08-15 PROCEDURE — G8420 CALC BMI NORM PARAMETERS: HCPCS | Performed by: INTERNAL MEDICINE

## 2022-08-15 PROCEDURE — 1123F ACP DISCUSS/DSCN MKR DOCD: CPT | Performed by: INTERNAL MEDICINE

## 2022-08-15 RX ORDER — ONDANSETRON 2 MG/ML
8 INJECTION INTRAMUSCULAR; INTRAVENOUS
Status: CANCELLED | OUTPATIENT
Start: 2022-09-12

## 2022-08-15 RX ORDER — ALBUTEROL SULFATE 90 UG/1
4 AEROSOL, METERED RESPIRATORY (INHALATION) PRN
Status: CANCELLED | OUTPATIENT
Start: 2022-09-12

## 2022-08-15 RX ORDER — DIPHENHYDRAMINE HYDROCHLORIDE 50 MG/ML
50 INJECTION INTRAMUSCULAR; INTRAVENOUS
Status: CANCELLED | OUTPATIENT
Start: 2022-09-12

## 2022-08-15 RX ORDER — EPINEPHRINE 1 MG/ML
0.3 INJECTION, SOLUTION, CONCENTRATE INTRAVENOUS PRN
Status: CANCELLED | OUTPATIENT
Start: 2022-09-12

## 2022-08-15 RX ORDER — ACETAMINOPHEN 325 MG/1
650 TABLET ORAL
Status: CANCELLED | OUTPATIENT
Start: 2022-09-12

## 2022-08-15 RX ORDER — SODIUM CHLORIDE 9 MG/ML
INJECTION, SOLUTION INTRAVENOUS CONTINUOUS
Status: CANCELLED | OUTPATIENT
Start: 2022-09-12

## 2022-08-15 RX ADMIN — LEUPROLIDE ACETATE 7.5 MG: KIT at 09:50

## 2022-08-15 ASSESSMENT — PATIENT HEALTH QUESTIONNAIRE - PHQ9
1. LITTLE INTEREST OR PLEASURE IN DOING THINGS: 0
SUM OF ALL RESPONSES TO PHQ9 QUESTIONS 1 & 2: 0
2. FEELING DOWN, DEPRESSED OR HOPELESS: 0
SUM OF ALL RESPONSES TO PHQ QUESTIONS 1-9: 0

## 2022-08-15 NOTE — PROGRESS NOTES
Arrived to the [facility]. Lupron injection completed. Provided education on Lupron. Patient has had this medication before. Opportunity for questions provided. Patient instructed to report any side affects to ordering provider. Patient tolerated well. Any issues or concerns during appointment: no.  Patient aware of next infusion appointment on 9/12/2022 (date) at 8 am (time). Discharged ambulatory with self.

## 2022-08-15 NOTE — PROGRESS NOTES
Per , Mayte, the Nubeqa prescription should be sent to: [facility]   [facility] [address] [address]. **It may come up as Specialty pharmacy or [facility]. Let me know if they still have difficulty.      Contact : [address]                                        [facility]    [address] [facility] [address]                                       [phone]  f [phone]     Contact: Bill Mares

## 2022-08-15 NOTE — PROGRESS NOTES
[facility] Hematology and Oncology: Office Visit Established Patient    Chief Complaint:    Chief Complaint   Patient presents with    Follow-up         History of Present Illness:  Mr. Neelima Candelario is a 78 y.o. male who returns today for management of prostate cancer. He is a patient  of Dr. LIN TEJADA AT [facility], previously followed by Dr. Phuong Taylor at [facility]. He originally had a diagnosis of prostate cancer in 2015 when a biopsy showed Floyd 6 disease with a PSA of 4. He elected for watchful waiting at that time. In 2016, he transferred care to  Dr. LIN TEJADA AT [facility] and PSA janelle to 7, then to 11.9, prompting repeat biopsy of the prostate that then showed Giulia 4+5 disease. He was also having some right hip pain and pelvic discomfort which prompted  systemic restaging, bone scan revealed some small suspicious uptake in the sternum and in the left proximal femur, with corresponding findings on CT, along with an asymmetrically enlarged right pelvic sidewall internal iliac lymph node measuring 17 mm. All told, these findings were felt to most likely represent metastatic disease. Dr. LIN TEJADA AT [facility] started him on Lupron with a 22.5 mg dose given on 10/3/16. He was referred to medical oncology for evaluation. We recommended short term observation with repeat  CT and bone scan after 3 months to determine whether these lesions responded to ADT, which would suggest stage IV disease. CT showed resolution of pelvic adenopathy but bone lesions are stable. PSA  is 0.6 consistent with excellent biochemical response. He is having pain in the left hip still. It is certainly possible that the bone lesions are not metastases, as one would have expected them to improve with ADT as the PSA and lymph node has done. We checked MRI of the left hip, which did not show bone metastases. Therefore, I believe the likelihood of these bone scan findings not representing metastases is quite high.   He is reluctant to consider any surgical options, so we recommended rad onc  referral for consideration of local therapy. This was completed in May 2017 and subsequently PSA dropped to 0, Lupron was continued until October 2018. 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 He has been hesitant to restart ADT given the known AEs, if his PET had been negative I would have felt more comfortable with this approach. However, with the two osseous lesions I would give stronger consideration to resuming ADT and even considering addition of androgen signaling inhibitor. I would also recommend adding antiresorptive therapy for his bone mets. He is agreeable to Lupron but would like to think about addition of the other two classes, this is reasonable since most of the clinical trials started several weeks or months after castrate sensitivity was confirmed. He would like to try a 1 month Lupron shot for the time being. Here for follow-up of Lupron. He tolerated this reasonably well. He did have some hot flashes, fatigue, loss of motivation, and general malaise, but upon further discussion this was fairly self-limited. No pain, he remains active with riding his bike and exercising. ECOG PS 0. Review of Systems:  Constitutional: Positive for fatigue and hot flashes. HENT: Negative. Eyes: Negative. Respiratory: Negative. Cardiovascular: Negative. Gastrointestinal: Negative. Genitourinary: Negative. Musculoskeletal: Negative. Skin: Negative. Neurological: Negative. Endo/Heme/Allergies: Negative. Psychiatric/Behavioral: Negative. All other systems reviewed and are negative.        Allergies   Allergen Reactions    Ciprofloxacin Other (See Comments)     Past Medical History:   Diagnosis Date    Anxiety     Arthritis     Elevated prostate specific antigen (PSA) 1/15/2014    Former cigar smoker     GERD (gastroesophageal reflux disease)     Hypertension     Hypertrophy of prostate with urinary obstruction and other lower urinary tract symptoms (LUTS) 1/15/2014    Prostate cancer [facility])      Past Surgical History:   Procedure Laterality Date    COLONOSCOPY      MALIGNANT SKIN LESION EXCISION      squamous cell - 2-3 times    PROSTATE BIOPSY, NEEDLE, SATURATION SAMPLING      TONSILLECTOMY  as child     Family History   Problem Relation Age of Onset    Heart Disease Father         pacemaker in his late [de-identified]     Social History     Socioeconomic History    Marital status:      Spouse name: Not on file    Number of children: Not on file    Years of education: Not on file    Highest education level: Not on file   Occupational History    Not on file   Tobacco Use    Smoking status: Former    Smokeless tobacco: Never   Substance and Sexual Activity    Alcohol use:  Yes     Alcohol/week: 6.0 - 8.0 standard drinks    Drug use: No    Sexual activity: Not on file   Other Topics Concern    Not on file   Social History Narrative    Not on file     Social Determinants of Health     Financial Resource Strain: Not on file   Food Insecurity: Not on file   Transportation Needs: Not on file   Physical Activity: Not on file   Stress: Not on file   Social Connections: Not on file   Intimate Partner Violence: Not on file   Housing Stability: Not on file     Current Outpatient Medications   Medication Sig Dispense Refill    fluticasone (FLONASE) 50 MCG/ACT nasal spray 2 sprays by Nasal route daily      potassium chloride (KLOR-CON M) 10 MEQ extended release tablet Take 10 mEq by mouth 2 times daily      famotidine (PEPCID) 40 MG tablet Take 40 mg by mouth daily      celecoxib (CELEBREX) 200 MG capsule Take 1 capsule by mouth 2 times daily 180 capsule 0    citalopram (CELEXA) 20 mg tablet Take 1 tablet by mouth daily 90 tablet 1    chlorthalidone (HYGROTON) 25 MG tablet Take 25 mg by mouth daily      LORazepam (ATIVAN) 1 MG tablet Take 0.5 mg by mouth 2 times daily as needed. zolpidem (AMBIEN) 10 MG tablet Take 10 mg by mouth nightly. No current facility-administered medications for this visit. OBJECTIVE:  BP (!) 141/81 (Site: Right Upper Arm, Position: Sitting, Cuff Size: Medium Adult)   Pulse 58   Temp 97.8 °F (36.6 °C) (Oral)   Resp 17   Ht 5' 9\" (1.753 m)   Wt 155 lb 3.2 oz (70.4 kg)   SpO2 98%   BMI 22.92 kg/m²     Physical Exam:  Constitutional: Well developed, well nourished male in no acute distress, sitting comfortably in the exam room chair. HEENT: Normocephalic and atraumatic. Sclerae anicteric. Neck supple without JVD. No thyromegaly present. Lymph node   No palpable submandibular, cervical, supraclavicular, axillary lymph nodes. Skin Warm and dry. No bruising and no rash noted. No erythema. No pallor. Respiratory Lungs are clear to auscultation bilaterally without wheezes, rales or rhonchi, normal air exchange without accessory muscle use. CVS Normal rate, regular rhythm and normal S1 and S2. No murmurs, gallops, or rubs. Neuro Grossly nonfocal with no obvious sensory or motor deficits. MSK Normal range of motion in general.  No edema and no tenderness. Psych Appropriate mood and affect.           Labs:  Recent Results (from the past 96 hour(s))   CBC with Auto Differential    Collection Time: 08/15/22  8:18 AM   Result Value Ref Range    WBC 7.5 4.3 - 11.1 K/uL    RBC 4.87 4.23 - 5.6 M/uL    Hemoglobin 15.0 13.6 - 17.2 g/dL    Hematocrit 46.2 %    MCV 94.9 79.6 - 97.8 FL    MCH 30.8 26.1 - 32.9 PG    MCHC 32.5 31.4 - 35.0 g/dL    RDW 12.8 11.9 - 14.6 %    Platelets 962 476 - 132 K/uL    MPV 9.7 9.4 - 12.3 FL    nRBC 0.00 0.0 - 0.2 K/uL    Seg Neutrophils 51 43 - 78 %    Lymphocytes 38 13 - 44 %    Monocytes 6 4.0 - 12.0 %    Eosinophils % 4 0.5 - 7.8 %    Basophils 1 0.0 - 2.0 %    Immature Granulocytes 0 0.0 - 5.0 %    Segs Absolute 3.8 1.7 - 8.2 K/UL    Absolute Lymph # 2.8 0.5 - 4.6 K/UL    Absolute Mono # 0.5 0.1 - 1.3 K/UL    Absolute Eos # 0.3 0.0 - 0.8 K/UL    Basophils Absolute 0.1 0.0 - 0.2 K/UL    Absolute Immature Granulocyte 0.0 0.0 - 0.5 K/UL    Differential Type AUTOMATED     Comprehensive Metabolic Panel    Collection Time: 08/15/22  8:18 AM   Result Value Ref Range    Sodium 142 136 - 145 mmol/L    Potassium 3.8 3.5 - 5.1 mmol/L    Chloride 107 98 - 107 mmol/L    CO2 30 21 - 32 mmol/L    Anion Gap 5 (L) 7 - 16 mmol/L    Glucose 124 (H) 65 - 100 mg/dL    BUN 25 (H) 8 - 23 MG/DL    Creatinine 1.40 0.8 - 1.5 MG/DL    GFR African American >60 >60 ml/min/1.73m2    GFR Non- 53 (L) >60 ml/min/1.73m2    Calcium 9.4 8.3 - 10.4 MG/DL    Total Bilirubin 1.0 0.2 - 1.1 MG/DL    ALT 23 12 - 65 U/L    AST 17 15 - 37 U/L    Alk Phosphatase 63 50 - 136 U/L    Total Protein 6.8 6.3 - 8.2 g/dL    Albumin 3.7 3.2 - 4.6 g/dL    Globulin 3.1 2.3 - 3.5 g/dL    Albumin/Globulin Ratio 1.2 1.2 - 3.5     PSA, Diagnostic    Collection Time: 08/15/22  8:18 AM   Result Value Ref Range    PSA 1.0 <4.0 ng/mL       Imaging:  No results found. ASSESSMENT:   Diagnosis Orders   1. Prostate cancer ([facility] [address]        2. Metastasis to bone [facility])                PLAN:  Lab studies were personally reviewed. Prostate cancer:  Previously on watchful waiting with suspected indolent disease, but then repeat biopsy after PSA rise showed [place] 4+5 disease September 2016, now with 2 suspicious bone lesions  and a single 1.7 cm iliac node, suspicious for metastasis. PSA was 11.9 prior to Lupron injection given on 10/3/16. I agree that the imaging findings are quite concerning for distant disease and that treatment is now warranted. Because of the ambivalent  nature of the imaging findings without a biopsy of distant disease, we favored restaging imaging after approximately 3 months. CT showed resolution of pelvic adenopathy but bone lesions are stable. PSA dropped to 0.6 consistent with excellent biochemical  response.   He was having pain in the left hip still, so we obtained MRI of the left hip, which did not show bone metastases. Therefore, I believe the likelihood of these bone scan findings not representing metastases is quite high. He was reluctant to  consider any surgical options, so I would recommend rad onc referral for consideration of local therapy. Completed IMRT May 2017 and on ADT from October 2016-2018, with PSA dropping to 0 after therapy. 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 He has been hesitant to restart ADT given the known AEs, if his PET had been negative I would have felt more comfortable with this approach. However, with the two osseous lesions I would give stronger consideration to resuming ADT and even considering addition of androgen signaling inhibitor. I would also recommend adding antiresorptive therapy for his bone mets. He is agreeable to Lupron but would like to think about addition of the other two classes, this is reasonable since most of the clinical trials started several weeks or months after castrate sensitivity was confirmed. He would like to try a 1 month Lupron shot for the time being. Here for follow-up of Lupron. He tolerated this reasonably well. He did have some hot flashes, fatigue, loss of motivation, and general malaise, but upon further discussion this was fairly self-limited. No pain, he remains active with riding his bike and exercising. ECOG PS 0. Labs reviewed, PSA has responded nicely, to 1.0 (from 2.5 last month), indicative castrate sensitivity. We discussed options, he agrees to continue with Lupron but wishes to continue monthly injections. He will agree to a trial of darolutamide, but refuses Xgeva due to the small volume osseous disease and the risk of side effects.   All questions were asked and answered to the best of my ability. F/u 4 weeks for Lupron, in 8 weeks for OV and further discussion of Nubeqa.            Deepika Thompson MD, MD  [facility] Hematology and Oncology  [address] [place] [address]  Office : [phone]  Fax : [phone]

## 2022-08-15 NOTE — PATIENT INSTRUCTIONS
Patient Instructions from Today's Visit    Reason for Visit:  Follow up - Prostate    Diagnosis Information:  [email or web address]-[email or web address]-education-[email or web address]-[email or web address]-sheets      Plan:  - Plan to start Nubeqa  Darolutamide        ([id number])    Trade Name(s): Saratha Harpin    Darolutamide is the generic name for the trade name drug Saratha Harpin. In some cases, health care professionals may use the trade name Saratha Harpin when referring to the generic drug name Darolutamide. Drug Type: Darolutamide is the anti-androgen medication. This medication is classified as an \"androgen receptor inhibitor\" (for more detail, see \"How Darolutamide Works\" below). What Darolutamide Is Used For  Darolutamide is used to treat non-metastatic, castration-resistant prostate cancer. This type of cancer is one that has not spread beyond the prostate and is resistant to medical or surgical treatments that lower testosterone. Note: If a drug has been approved for one use, physicians may elect to use this same drug for other problems if they believe it may be helpful. How Darolutamide Is Given  Darolutamide is a tablet that is taken by mouth twice daily with food. You should take Darolutamide at the same times each day. Swallow tablets whole. If you miss a dose, take it as soon as you remember prior to the next scheduled dose. Do not take 2 doses at once to make up a missed dose. If you are not sure what to do, contact your health care provider. You should not stop taking Darolutamide without speaking with your doctor first.  Darolutamide is commonly given after a surgical procedure where both testicles are removed or is given in combination with another class of medications known as gonadotropin releasing hormone analogs. Side Effects  Important things to remember about the side effects of Darolutamide:    Most people will not experience all of the side effects listed.   Side effects are often predictable in terms of their onset, duration, and severity. Side effects will improve after therapy is complete. Many of the side effects may be manageable. There are many options to minimize or prevent the side effects of Darolutamide. The following side effects are common (occurring in greater than 10%) for patients taking Darolutamide:    Fatigue  Low white blood cell counts (neutropenia)  Elevated blood liver enzymes  Loss of fertility in males. Meaning, your ability to conceive or father a child may be affected by Darolutamide. Discuss this issue with your health care provider. These are less common side effects (occurring in 5-10%) for patients receiving Darolutamide:    Joint pain (usually back, arm, or leg pain)  High blood pressure  Constipation  Diarrhea  Nausea  Hot flashes  Not all side effects are listed above. Side effects that are very rare are not listed here. But you should always inform your health care provider if you experience any unusual symptoms. When to Contact Your Doctor or [facility] Provider  Contact your health care provider immediately, day or night, if you should experience any of the following symptoms:    Fever of 100.4º F (38º C) or higher, chills (possible signs of infection)  The following symptoms require medical attention, but are not an emergency. Contact your health care provider within 24 hours of noticing any of the following:    Nausea (interferes with ability to eat and unrelieved with prescribed medication)  Vomiting (vomiting more than 4-5 times in a 24-hour period)  Diarrhea (4-6 episodes in a 24-hour period)  Black or tarry stools, or blood in your stools  Blood in the urine  Pain or burning with urination  Extreme fatigue (unable to carry on self-care activities)  Always inform your health care provider if you experience any unusual symptoms.     Precautions  Before starting Darolutamide treatment, make sure you tell your doctor about any other medications you are taking (including prescription, over-the-counter, vitamins, herbal remedies, etc.). Inform your health care professional if you and your partner are pregnant or may be pregnant prior to starting this treatment. Use contraceptives, and do not conceive a child (get pregnant) while taking Darolutamide. Barrier methods of contraception, such as condoms, are recommended during treatment and for up to 1 week after last dose of Darolutamide. Androgen receptor inhibitors, like Darolutamide, are usually only given to men. However, if Darolutamide is given to a woman, she should not conceive a child or breastfeed while taking this medication. Darolutamide has not been studied in women. Based on the way Darolutamide works, it is believed that this medication can cause fetal harm and loss of pregnancy when given to a pregnant female. Self-Care Tips  Drink at least two to three quarts of fluid every 24 hours, unless you are instructed otherwise. You may be at risk of infection so try to avoid crowds or people with colds, and report fever or any other signs of infection immediately to your health care provider. Wash your hands often. To reduce nausea, take anti-nausea medications as prescribed by your doctor, and eat small, frequent meals. Follow regimen of anti-diarrhea medication as prescribed by your health care professional.  Eat foods that may help reduce diarrhea (see managing side effects - diarrhea). Get plenty of rest.  Maintain good nutrition. Remain active as you are able. Gentle exercise is encouraged such as a daily walk. If you experience symptoms or side effects, be sure to discuss them with your health care team. They can prescribe medications and/or offer other suggestions that are effective in managing such problems.   Monitoring and Testing While Taking Darolutamide  You will be checked regularly by your doctor while you are taking Darolutamide to monitor side effects and check your response to therapy. Periodic blood work will be obtained to monitor your complete blood count (CBC) as well as the function of other organs (such as your kidneys and liver) will also be ordered by your doctor. How Darolutamide Works  Androgen receptors are normally found in prostate cells and are responsible for finding the various hormones (androgens, specifically testosterone) and producing various effects on the body. Darolutamide is an androgen receptor inhibitor. Androgen receptor inhibitors work by targeting the hormones from activating chemical pathways within the body that could lead to rapid division of cancer cells. Essentially, this \"starves\" the cancer cells of the hormones they need to grow and divide. Androgen receptor inhibitors are often used in combination with gonadotropin releasing hormone analogs to prevent cancer cells from growing and dividing rapidly. Note: We strongly encourage you to talk with your health care professionals about your specific medical condition and treatments. The information contained in this website is meant to be helpful and educational, but is not a substitute for medical advice.     Follow Up:  - 2 months    Recent Lab Results:  Hospital Outpatient Visit on 08/15/2022   Component Date Value Ref Range Status    WBC 08/15/2022 7.5  4.3 - 11.1 K/uL Final    RBC 08/15/2022 4.87  4.23 - 5.6 M/uL Final    Hemoglobin 08/15/2022 15.0  13.6 - 17.2 g/dL Final    Hematocrit 08/15/2022 46.2  % Final    MCV 08/15/2022 94.9  79.6 - 97.8 FL Final    MCH 08/15/2022 30.8  26.1 - 32.9 PG Final    MCHC 08/15/2022 32.5  31.4 - 35.0 g/dL Final    RDW 08/15/2022 12.8  11.9 - 14.6 % Final    Platelets 33/81/6130 256  150 - 450 K/uL Final    MPV 08/15/2022 9.7  9.4 - 12.3 FL Final    nRBC 08/15/2022 0.00  0.0 - 0.2 K/uL Final    **Note: Absolute NRBC parameter is now reported with Hemogram**    Seg Neutrophils 08/15/2022 51  43 - 78 % Final    Lymphocytes 08/15/2022 38  13 - 44 % Final    Monocytes 08/15/2022 6  4.0 - 12.0 % Final    Eosinophils % 08/15/2022 4  0.5 - 7.8 % Final    Basophils 08/15/2022 1  0.0 - 2.0 % Final    Immature Granulocytes 08/15/2022 0  0.0 - 5.0 % Final    Segs Absolute 08/15/2022 3.8  1.7 - 8.2 K/UL Final    Absolute Lymph # 08/15/2022 2.8  0.5 - 4.6 K/UL Final    Absolute Mono # 08/15/2022 0.5  0.1 - 1.3 K/UL Final    Absolute Eos # 08/15/2022 0.3  0.0 - 0.8 K/UL Final    Basophils Absolute 08/15/2022 0.1  0.0 - 0.2 K/UL Final    Absolute Immature Granulocyte 08/15/2022 0.0  0.0 - 0.5 K/UL Final    Differential Type 08/15/2022 AUTOMATED    Final    Sodium 08/15/2022 142  136 - 145 mmol/L Final    Potassium 08/15/2022 3.8  3.5 - 5.1 mmol/L Final    Chloride 08/15/2022 107  98 - 107 mmol/L Final    CO2 08/15/2022 30  21 - 32 mmol/L Final    Anion Gap 08/15/2022 5 (A) 7 - 16 mmol/L Final    Glucose 08/15/2022 124 (A) 65 - 100 mg/dL Final    BUN 08/15/2022 25 (A) 8 - 23 MG/DL Final    Creatinine 08/15/2022 1.40  0.8 - 1.5 MG/DL Final    GFR  08/15/2022 >60  >60 ml/min/1.73m2 Final    GFR Non- 08/15/2022 53 (A) >60 ml/min/1.73m2 Final    Comment:      Estimated GFR is calculated using the Modification of Diet in Renal Disease (MDRD) Study equation, reported for both  Americans (GFRAA) and non- Americans (GFRNA), and normalized to 1.73m2 body surface area. The physician must decide which value applies to the patient. The MDRD study equation should only be used in individuals age 25 or older. It has not been validated for the following: pregnant women, patients with serious comorbid conditions,or on certain medications, or persons with extremes of body size, muscle mass, or nutritional status.       Calcium 08/15/2022 9.4  8.3 - 10.4 MG/DL Final    Total Bilirubin 08/15/2022 1.0  0.2 - 1.1 MG/DL Final    ALT 08/15/2022 23  12 - 65 U/L Final    AST 08/15/2022 17  15 - 37 U/L Final    Alk Phosphatase 08/15/2022 63  50 - 136 U/L Final    Total Protein 08/15/2022 6.8  6.3 - 8.2 g/dL Final    Albumin 08/15/2022 3.7  3.2 - 4.6 g/dL Final    Globulin 08/15/2022 3.1  2.3 - 3.5 g/dL Final    Albumin/Globulin Ratio 08/15/2022 1.2  1.2 - 3.5   Final    PSA 08/15/2022 1.0  <4.0 ng/mL Final    Comment: [employer]. New method in use, please reestablish patient baseline           Treatment Summary has been discussed and given to patient: n/a        -------------------------------------------------------------------------------------------------------------------  Please call our office at [phone] if you have any  of the following symptoms:   Fever of 100.5 or greater  Chills  Shortness of breath  Swelling or pain in one leg    After office hours an answering service is available and will contact a provider for emergencies or if you are experiencing any of the above symptoms. Patient has My Chart. My Chart log in information explained on the after visit summary printout at the [facility] [address] desk.     Rigo Aguiar RN

## 2022-09-12 ENCOUNTER — HOSPITAL ENCOUNTER (OUTPATIENT)
Dept: INFUSION THERAPY | Age: 77
Discharge: HOME | End: 2022-09-12
Payer: MEDICARE

## 2022-09-12 VITALS
DIASTOLIC BLOOD PRESSURE: 85 MMHG | WEIGHT: 154.8 LBS | RESPIRATION RATE: 16 BRPM | HEART RATE: 58 BPM | BODY MASS INDEX: 22.86 KG/M2 | SYSTOLIC BLOOD PRESSURE: 143 MMHG | TEMPERATURE: 97.8 F | OXYGEN SATURATION: 98 %

## 2022-09-12 DIAGNOSIS — C61 PROSTATE CANCER (HCC): Primary | ICD-10-CM

## 2022-09-12 PROCEDURE — 6360000002 HC RX W HCPCS: Performed by: INTERNAL MEDICINE

## 2022-09-12 PROCEDURE — 96402 CHEMO HORMON ANTINEOPL SQ/IM: CPT

## 2022-09-12 RX ORDER — ALBUTEROL SULFATE 90 UG/1
4 AEROSOL, METERED RESPIRATORY (INHALATION) PRN
Status: CANCELLED | OUTPATIENT
Start: 2022-10-10

## 2022-09-12 RX ORDER — ONDANSETRON 2 MG/ML
8 INJECTION INTRAMUSCULAR; INTRAVENOUS
Status: CANCELLED | OUTPATIENT
Start: 2022-10-10

## 2022-09-12 RX ORDER — ACETAMINOPHEN 325 MG/1
650 TABLET ORAL
Status: CANCELLED | OUTPATIENT
Start: 2022-10-10

## 2022-09-12 RX ORDER — SODIUM CHLORIDE 9 MG/ML
INJECTION, SOLUTION INTRAVENOUS CONTINUOUS
Status: CANCELLED | OUTPATIENT
Start: 2022-10-10

## 2022-09-12 RX ORDER — DIPHENHYDRAMINE HYDROCHLORIDE 50 MG/ML
50 INJECTION INTRAMUSCULAR; INTRAVENOUS
Status: CANCELLED | OUTPATIENT
Start: 2022-10-10

## 2022-09-12 RX ORDER — EPINEPHRINE 1 MG/ML
0.3 INJECTION, SOLUTION, CONCENTRATE INTRAVENOUS PRN
Status: CANCELLED | OUTPATIENT
Start: 2022-10-10

## 2022-09-12 RX ADMIN — LEUPROLIDE ACETATE 7.5 MG: KIT at 10:14

## 2022-09-12 NOTE — PROGRESS NOTES
Arrived to the [facility]. Lupron completed. Patient tolerated well. Any issues or concerns during appointment: None. Patient aware of next infusion appointment on 10/10 (date) at 56 (time). Patient aware of next lab and [facility] office visit on 10/10 (date) at [address] [facility] (time). Patient instructed to call provider with temperature of 100.4 or greater or nausea/vomiting/ diarrhea or pain not controlled by medications  Discharged ambulatory in stable condition.

## 2022-10-05 DIAGNOSIS — M17.11 ARTHRITIS OF RIGHT KNEE: ICD-10-CM

## 2022-10-06 RX ORDER — CELECOXIB 200 MG/1
200 CAPSULE ORAL 2 TIMES DAILY
Qty: 180 CAPSULE | Refills: 0 | Status: SHIPPED | OUTPATIENT
Start: 2022-10-06 | End: 2022-10-10 | Stop reason: ALTCHOICE

## 2022-10-10 ENCOUNTER — OFFICE VISIT (OUTPATIENT)
Dept: ONCOLOGY | Age: 77
End: 2022-10-10
Payer: MEDICARE

## 2022-10-10 ENCOUNTER — HOSPITAL ENCOUNTER (OUTPATIENT)
Dept: LAB | Age: 77
Discharge: HOME | End: 2022-10-13
Payer: MEDICARE

## 2022-10-10 ENCOUNTER — HOSPITAL ENCOUNTER (OUTPATIENT)
Dept: INFUSION THERAPY | Age: 77
Discharge: HOME | End: 2022-10-10
Payer: MEDICARE

## 2022-10-10 VITALS
DIASTOLIC BLOOD PRESSURE: 84 MMHG | BODY MASS INDEX: 23.08 KG/M2 | SYSTOLIC BLOOD PRESSURE: 141 MMHG | TEMPERATURE: 97.8 F | HEART RATE: 64 BPM | RESPIRATION RATE: 16 BRPM | OXYGEN SATURATION: 98 % | HEIGHT: 69 IN | WEIGHT: 155.8 LBS

## 2022-10-10 DIAGNOSIS — C79.51 METASTASIS TO BONE (HCC): ICD-10-CM

## 2022-10-10 DIAGNOSIS — C61 PROSTATE CANCER (HCC): Primary | ICD-10-CM

## 2022-10-10 DIAGNOSIS — C61 PROSTATE CANCER (HCC): ICD-10-CM

## 2022-10-10 LAB
ALBUMIN SERPL-MCNC: 3.8 G/DL (ref 3.2–4.6)
ALBUMIN/GLOB SERPL: 1.2 {RATIO} (ref 1.2–3.5)
ALP SERPL-CCNC: 74 U/L (ref 50–136)
ALT SERPL-CCNC: 33 U/L (ref 12–65)
ANION GAP SERPL CALC-SCNC: 6 MMOL/L (ref 4–13)
AST SERPL-CCNC: 37 U/L (ref 15–37)
BASOPHILS # BLD: 0 K/UL (ref 0–0.2)
BASOPHILS NFR BLD: 1 % (ref 0–2)
BILIRUB SERPL-MCNC: 1.8 MG/DL (ref 0.2–1.1)
BUN SERPL-MCNC: 30 MG/DL (ref 8–23)
CALCIUM SERPL-MCNC: 9.5 MG/DL (ref 8.3–10.4)
CHLORIDE SERPL-SCNC: 106 MMOL/L (ref 101–110)
CO2 SERPL-SCNC: 31 MMOL/L (ref 21–32)
CREAT SERPL-MCNC: 1.2 MG/DL (ref 0.8–1.5)
DIFFERENTIAL METHOD BLD: NORMAL
EOSINOPHIL # BLD: 0.3 K/UL (ref 0–0.8)
EOSINOPHIL NFR BLD: 5 % (ref 0.5–7.8)
ERYTHROCYTE [DISTWIDTH] IN BLOOD BY AUTOMATED COUNT: 12.9 % (ref 11.9–14.6)
GLOBULIN SER CALC-MCNC: 3.3 G/DL (ref 2.3–3.5)
GLUCOSE SERPL-MCNC: 91 MG/DL (ref 65–100)
HCT VFR BLD AUTO: 43.5 %
HGB BLD-MCNC: 14.3 G/DL (ref 13.6–17.2)
IMM GRANULOCYTES # BLD AUTO: 0 K/UL (ref 0–0.5)
IMM GRANULOCYTES NFR BLD AUTO: 0 % (ref 0–5)
LYMPHOCYTES # BLD: 2.5 K/UL (ref 0.5–4.6)
LYMPHOCYTES NFR BLD: 38 % (ref 13–44)
MCH RBC QN AUTO: 31 PG (ref 26.1–32.9)
MCHC RBC AUTO-ENTMCNC: 32.9 G/DL (ref 31.4–35)
MCV RBC AUTO: 94.2 FL (ref 79.6–97.8)
MONOCYTES # BLD: 0.4 K/UL (ref 0.1–1.3)
MONOCYTES NFR BLD: 7 % (ref 4–12)
NEUTS SEG # BLD: 3.3 K/UL (ref 1.7–8.2)
NEUTS SEG NFR BLD: 50 % (ref 43–78)
NRBC # BLD: 0 K/UL (ref 0–0.2)
PLATELET # BLD AUTO: 266 K/UL (ref 150–450)
PMV BLD AUTO: 9.8 FL (ref 9.4–12.3)
POTASSIUM SERPL-SCNC: 3.2 MMOL/L (ref 3.5–5.1)
PROT SERPL-MCNC: 7.1 G/DL (ref 6.3–8.2)
PSA SERPL-MCNC: 0.1 NG/ML
RBC # BLD AUTO: 4.62 M/UL (ref 4.23–5.6)
SODIUM SERPL-SCNC: 143 MMOL/L (ref 136–145)
WBC # BLD AUTO: 6.6 K/UL (ref 4.3–11.1)

## 2022-10-10 PROCEDURE — G8484 FLU IMMUNIZE NO ADMIN: HCPCS | Performed by: INTERNAL MEDICINE

## 2022-10-10 PROCEDURE — 1123F ACP DISCUSS/DSCN MKR DOCD: CPT | Performed by: INTERNAL MEDICINE

## 2022-10-10 PROCEDURE — 80053 COMPREHEN METABOLIC PANEL: CPT

## 2022-10-10 PROCEDURE — G8420 CALC BMI NORM PARAMETERS: HCPCS | Performed by: INTERNAL MEDICINE

## 2022-10-10 PROCEDURE — 36415 COLL VENOUS BLD VENIPUNCTURE: CPT

## 2022-10-10 PROCEDURE — 84153 ASSAY OF PSA TOTAL: CPT

## 2022-10-10 PROCEDURE — 85025 COMPLETE CBC W/AUTO DIFF WBC: CPT

## 2022-10-10 PROCEDURE — 6360000002 HC RX W HCPCS: Performed by: INTERNAL MEDICINE

## 2022-10-10 PROCEDURE — 1036F TOBACCO NON-USER: CPT | Performed by: INTERNAL MEDICINE

## 2022-10-10 PROCEDURE — G8428 CUR MEDS NOT DOCUMENT: HCPCS | Performed by: INTERNAL MEDICINE

## 2022-10-10 PROCEDURE — 99214 OFFICE O/P EST MOD 30 MIN: CPT | Performed by: INTERNAL MEDICINE

## 2022-10-10 PROCEDURE — 96402 CHEMO HORMON ANTINEOPL SQ/IM: CPT

## 2022-10-10 PROCEDURE — 3017F COLORECTAL CA SCREEN DOC REV: CPT | Performed by: INTERNAL MEDICINE

## 2022-10-10 RX ORDER — ALBUTEROL SULFATE 90 UG/1
4 AEROSOL, METERED RESPIRATORY (INHALATION) PRN
OUTPATIENT
Start: 2022-10-10

## 2022-10-10 RX ORDER — SODIUM CHLORIDE 9 MG/ML
INJECTION, SOLUTION INTRAVENOUS CONTINUOUS
OUTPATIENT
Start: 2023-01-02

## 2022-10-10 RX ORDER — ALBUTEROL SULFATE 90 UG/1
4 AEROSOL, METERED RESPIRATORY (INHALATION) PRN
OUTPATIENT
Start: 2023-01-02

## 2022-10-10 RX ORDER — ACETAMINOPHEN 325 MG/1
650 TABLET ORAL
OUTPATIENT
Start: 2022-10-10

## 2022-10-10 RX ORDER — ONDANSETRON 2 MG/ML
8 INJECTION INTRAMUSCULAR; INTRAVENOUS
OUTPATIENT
Start: 2023-01-02

## 2022-10-10 RX ORDER — SODIUM CHLORIDE 9 MG/ML
INJECTION, SOLUTION INTRAVENOUS CONTINUOUS
OUTPATIENT
Start: 2022-10-10

## 2022-10-10 RX ORDER — DIPHENHYDRAMINE HYDROCHLORIDE 50 MG/ML
50 INJECTION INTRAMUSCULAR; INTRAVENOUS
OUTPATIENT
Start: 2023-01-02

## 2022-10-10 RX ORDER — DIPHENHYDRAMINE HYDROCHLORIDE 50 MG/ML
50 INJECTION INTRAMUSCULAR; INTRAVENOUS
OUTPATIENT
Start: 2022-10-10

## 2022-10-10 RX ORDER — ONDANSETRON 2 MG/ML
8 INJECTION INTRAMUSCULAR; INTRAVENOUS
OUTPATIENT
Start: 2022-10-10

## 2022-10-10 RX ORDER — FAMOTIDINE 10 MG/ML
20 INJECTION, SOLUTION INTRAVENOUS
OUTPATIENT
Start: 2022-10-10

## 2022-10-10 RX ORDER — EPINEPHRINE 1 MG/ML
0.3 INJECTION, SOLUTION, CONCENTRATE INTRAVENOUS PRN
OUTPATIENT
Start: 2022-10-10

## 2022-10-10 RX ORDER — EPINEPHRINE 1 MG/ML
0.3 INJECTION, SOLUTION, CONCENTRATE INTRAVENOUS PRN
OUTPATIENT
Start: 2023-01-02

## 2022-10-10 RX ORDER — ACETAMINOPHEN 325 MG/1
650 TABLET ORAL
OUTPATIENT
Start: 2023-01-02

## 2022-10-10 RX ADMIN — LEUPROLIDE ACETATE 22.5 MG: KIT at 10:14

## 2022-10-10 ASSESSMENT — PATIENT HEALTH QUESTIONNAIRE - PHQ9
SUM OF ALL RESPONSES TO PHQ QUESTIONS 1-9: 0
SUM OF ALL RESPONSES TO PHQ QUESTIONS 1-9: 0
1. LITTLE INTEREST OR PLEASURE IN DOING THINGS: 0
2. FEELING DOWN, DEPRESSED OR HOPELESS: 0
SUM OF ALL RESPONSES TO PHQ9 QUESTIONS 1 & 2: 0
SUM OF ALL RESPONSES TO PHQ QUESTIONS 1-9: 0
SUM OF ALL RESPONSES TO PHQ QUESTIONS 1-9: 0

## 2022-10-10 NOTE — PROGRESS NOTES
Arrived to the [facility]. Sriram completed. Provided education on Lupron    Patient instructed to report any side affects to ordering provider. Patient tolerated without complications. Any issues or concerns during appointment: NO.  Patient aware of next infusion appointment on 1/10/23 at 1030. Discharged ambulatory.

## 2022-10-10 NOTE — PATIENT INSTRUCTIONS
Patient Instructions from Today's Visit    Reason for Visit:  Follow up-Prostate    Diagnosis Information:  [email or web address]-sheets       Plan: For a compression fracture we typically would recommend a kyphoplasty. If this still continues to bother you then it's worth while for you to talk to the spine specialist.  Your PSA is 0.1 today. We will refer you to [place] [facility].   Follow Up:  Dr Angelo Newman in 3 months    Recent Lab Results:  Hospital Outpatient Visit on 10/10/2022   Component Date Value Ref Range Status    WBC 10/10/2022 6.6  4.3 - 11.1 K/uL Final    RBC 10/10/2022 4.62  4.23 - 5.6 M/uL Final    Hemoglobin 10/10/2022 14.3  13.6 - 17.2 g/dL Final    Hematocrit 10/10/2022 43.5  % Final    MCV 10/10/2022 94.2  79.6 - 97.8 FL Final    MCH 10/10/2022 31.0  26.1 - 32.9 PG Final    MCHC 10/10/2022 32.9  31.4 - 35.0 g/dL Final    RDW 10/10/2022 12.9  11.9 - 14.6 % Final    Platelets 50/57/5898 266  150 - 450 K/uL Final    MPV 10/10/2022 9.8  9.4 - 12.3 FL Final    nRBC 10/10/2022 0.00  0.0 - 0.2 K/uL Final    **Note: Absolute NRBC parameter is now reported with Hemogram**    Differential Type 10/10/2022 AUTOMATED    Final    Seg Neutrophils 10/10/2022 50  43 - 78 % Final    Lymphocytes 10/10/2022 38  13 - 44 % Final    Monocytes 10/10/2022 7  4.0 - 12.0 % Final    Eosinophils % 10/10/2022 5  0.5 - 7.8 % Final    Basophils 10/10/2022 1  0.0 - 2.0 % Final    Immature Granulocytes 10/10/2022 0  0.0 - 5.0 % Final    Segs Absolute 10/10/2022 3.3  1.7 - 8.2 K/UL Final    Absolute Lymph # 10/10/2022 2.5  0.5 - 4.6 K/UL Final    Absolute Mono # 10/10/2022 0.4  0.1 - 1.3 K/UL Final    Absolute Eos # 10/10/2022 0.3  0.0 - 0.8 K/UL Final    Basophils Absolute 10/10/2022 0.0  0.0 - 0.2 K/UL Final    Absolute Immature Granulocyte 10/10/2022 0.0  0.0 - 0.5 K/UL Final         Treatment Summary has been discussed and given to patient: n/a        -------------------------------------------------------------------------------------------------------------------  Please call our office at [phone] if you have any  of the following symptoms:   Fever of 100.5 or greater  Chills  Shortness of breath  Swelling or pain in one leg    After office hours an answering service is available and will contact a provider for emergencies or if you are experiencing any of the above symptoms. Patient does express an interest in My Chart. My Chart log in information explained on the after visit summary printout at the [address] desk.     Jodie Adair MA
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
## 2022-10-31 ENCOUNTER — OFFICE VISIT (OUTPATIENT)
Dept: NEUROSURGERY | Age: 77
End: 2022-10-31
Payer: MEDICARE

## 2022-10-31 VITALS
HEART RATE: 58 BPM | TEMPERATURE: 97.5 F | DIASTOLIC BLOOD PRESSURE: 88 MMHG | HEIGHT: 69 IN | BODY MASS INDEX: 23.4 KG/M2 | WEIGHT: 158 LBS | SYSTOLIC BLOOD PRESSURE: 158 MMHG | OXYGEN SATURATION: 99 %

## 2022-10-31 DIAGNOSIS — M54.6 THORACIC BACK PAIN, UNSPECIFIED BACK PAIN LATERALITY, UNSPECIFIED CHRONICITY: Primary | ICD-10-CM

## 2022-10-31 PROCEDURE — 1123F ACP DISCUSS/DSCN MKR DOCD: CPT | Performed by: NURSE PRACTITIONER

## 2022-10-31 PROCEDURE — 1036F TOBACCO NON-USER: CPT | Performed by: NURSE PRACTITIONER

## 2022-10-31 PROCEDURE — 99203 OFFICE O/P NEW LOW 30 MIN: CPT | Performed by: NURSE PRACTITIONER

## 2022-10-31 PROCEDURE — G8427 DOCREV CUR MEDS BY ELIG CLIN: HCPCS | Performed by: NURSE PRACTITIONER

## 2022-10-31 PROCEDURE — G8420 CALC BMI NORM PARAMETERS: HCPCS | Performed by: NURSE PRACTITIONER

## 2022-10-31 PROCEDURE — 3017F COLORECTAL CA SCREEN DOC REV: CPT | Performed by: NURSE PRACTITIONER

## 2022-10-31 PROCEDURE — G8484 FLU IMMUNIZE NO ADMIN: HCPCS | Performed by: NURSE PRACTITIONER

## 2022-10-31 RX ORDER — CELECOXIB 200 MG/1
200 CAPSULE ORAL 2 TIMES DAILY
COMMUNITY

## 2022-10-31 RX ORDER — DAROLUTAMIDE 300 MG/1
TABLET, FILM COATED ORAL
COMMUNITY
Start: 2022-10-07

## 2022-10-31 RX ORDER — TIZANIDINE 2 MG/1
TABLET ORAL
COMMUNITY
Start: 2022-09-16

## 2022-10-31 RX ORDER — DEXAMETHASONE 2 MG/1
TABLET ORAL
Qty: 50 TABLET | Refills: 0 | Status: SHIPPED | OUTPATIENT
Start: 2022-10-31 | End: 2022-11-10

## 2022-10-31 NOTE — PROGRESS NOTES
[facility] CLINIC NOTE:   History of Present Illness:    CC: Mid thoracic back pain    Rekha Lynn is a 78 y.o. male here to be evaluated for mid thoracic back pain. Patient states he recently started to have a lot of pain in between his shoulder blades around his thoracic spine. Patient states sometimes the pain is left-sided other times his right-sided. Patient states there is no continuity in intensity or location. Patient states his prostate cancer was noted recently on a PET scan to be located at T7 and his spine. Patient states the muscle relaxers he has taken only make him sleepy but do not alleviate his symptoms. AP and lateral T-spine x-rays taken at [facility] reveal a chronic wedge compression fracture at either T8 or T9. Past Medical History:   Diagnosis Date    Anxiety     Arthritis     Elevated prostate specific antigen (PSA) 1/15/2014    Former cigar smoker     GERD (gastroesophageal reflux disease)     Hypertension     Hypertrophy of prostate with urinary obstruction and other lower urinary tract symptoms (LUTS) 1/15/2014    Prostate cancer ([address]       Past Surgical History:   Procedure Laterality Date    COLONOSCOPY      MALIGNANT SKIN LESION EXCISION      squamous cell - 2-3 times    PROSTATE BIOPSY, NEEDLE, SATURATION SAMPLING      TONSILLECTOMY  as child     Allergies   Allergen Reactions    Ciprofloxacin Other (See Comments)      Family History   Problem Relation Age of Onset    Heart Disease Father         pacemaker in his late [de-identified]      Social History     Socioeconomic History    Marital status:      Spouse name: Not on file    Number of children: Not on file    Years of education: Not on file    Highest education level: Not on file   Occupational History    Not on file   Tobacco Use    Smoking status: Former    Smokeless tobacco: Never   Substance and Sexual Activity    Alcohol use: Yes     Alcohol/week: 6.0 - 8.0 standard drinks    Drug use:  No pain/angina pectoris, palpitations, swelling of feet/ankles/hands, or calf pain while walking. Respiratory: No chronic or frequent coughs, spitting up blood, shortness of breath, No asthma, or wheezing. Gastrointestinal: No a bdominal pain, heartburn, nausea, vomiting, constipation, or frequent diarrhea     Genitourinary: No frequent urination, burning or painful urination, or blood in urine     Musculoskeletal:   POS thoracic back pain     Integument:   No rash/itching     Neurological:   Dizziness/vertigo, No numbness/tingling sensation, tremors, No weakness in limbs, frequent or recurring headaches, memory loss or confusion. Physical Exam:    General: No acute distress  Head normocephalic and atraumatic  Mood and affect appropriate  CV: Regular rate   Resp: No increased work of breathing  Skin: warm and dry   Awake, alert, and oriented   Speech fluent  Eyes open spontaneously   Face symmetric and tongue midline on protrusion  Sternocleidomastoid and trapezius 5/5  No mid-line cervical, thoracic, or lumbar tenderness to palpation   Patient with strength exam as follows:   Upper Extremities: Right Left      Deltoid  5 5    Biceps  5 5    Triceps  5 5      5 5   Hand Intrinsics  5 5  Wrist flexors/extensors  5 5     Lower Extremities:      Hip Flex 5 5    Quads  5 5    Hamstrings 5 5    Dorsiflex 5 5    Plantarflex 5 5    EHL  5 5  Sensation intact to light touch and pin-prick   DTR 2+  No clonus or babinski present   No Evans's sign present bilaterally   Gait normal    Assessment & Plan:  Alexandria Bumpers is a 78 y.o. male who presents to be evaluated for his thoracic back pain. Based off of patient's description of symptoms and physical exam it sounds as though the patient's symptoms are muscular in nature. I am sending in a Decadron taper to the pharmacy to help with the inflammation. Patient will follow up here if he wishes to make a follow-up appointment. No diagnosis found.     Notes are transcribed with [employer], a medical voice recording dictation service, and may contain minor errors.     Josette Terrazas, NP  [id number]

## 2022-11-25 ENCOUNTER — TELEPHONE (OUTPATIENT)
Dept: ONCOLOGY | Age: 77
End: 2022-11-25

## 2022-11-25 NOTE — TELEPHONE ENCOUNTER
PT called to report back pain in the shoulder blade area/states \"there was a spot on my last scan and that is where the pain is\"/states no medication helps with the pain/states PCP prescribed a muscle relaxer when pain was first present/pain described pain as excruciating

## 2022-11-25 NOTE — TELEPHONE ENCOUNTER
I reviewed the chart and the last PET scan was at [place] results in care everywhere. Next appt is in January. Msg to NP Pool. Call to the patient and he is to go to the PCP or Urgent care. He states the PCP is closed. He asked for an appt for Monday. Msg being sent to [facility] office for a f/u with pain in his back.   He Verbalizes understanding of instructions

## 2022-11-28 ENCOUNTER — PATIENT MESSAGE (OUTPATIENT)
Dept: NEUROSURGERY | Age: 77
End: 2022-11-28

## 2022-11-28 DIAGNOSIS — M54.6 THORACIC BACK PAIN, UNSPECIFIED BACK PAIN LATERALITY, UNSPECIFIED CHRONICITY: Primary | ICD-10-CM

## 2022-11-28 NOTE — TELEPHONE ENCOUNTER
From: Kell Rodriguez  To: Wilner Lockhart  Sent: 11/28/2022 8:44 AM EST  Subject: Spinal pain return    After steroid meds - pain has returned worse than   before. while on steroids and a week or so after,  no pain. .we discussed an MRIplease advise if   One can be scheduled or do I need to come in for a visit firstpain level is 5 to 10 at times.  Thanks

## 2022-12-12 ENCOUNTER — HOSPITAL ENCOUNTER (OUTPATIENT)
Dept: MRI IMAGING | Age: 77
Discharge: HOME | End: 2022-12-15
Payer: MEDICARE

## 2022-12-12 DIAGNOSIS — M54.6 THORACIC BACK PAIN, UNSPECIFIED BACK PAIN LATERALITY, UNSPECIFIED CHRONICITY: ICD-10-CM

## 2022-12-12 PROCEDURE — 72146 MRI CHEST SPINE W/O DYE: CPT

## 2022-12-14 ENCOUNTER — OFFICE VISIT (OUTPATIENT)
Dept: NEUROSURGERY | Age: 77
End: 2022-12-14
Payer: MEDICARE

## 2022-12-14 VITALS
WEIGHT: 160 LBS | HEIGHT: 69 IN | OXYGEN SATURATION: 99 % | TEMPERATURE: 97.3 F | HEART RATE: 60 BPM | SYSTOLIC BLOOD PRESSURE: 147 MMHG | BODY MASS INDEX: 23.7 KG/M2 | DIASTOLIC BLOOD PRESSURE: 91 MMHG

## 2022-12-14 DIAGNOSIS — C61 PROSTATE CANCER (HCC): Primary | ICD-10-CM

## 2022-12-14 DIAGNOSIS — M54.6 THORACIC SPINE PAIN: ICD-10-CM

## 2022-12-14 PROCEDURE — 1036F TOBACCO NON-USER: CPT | Performed by: NURSE PRACTITIONER

## 2022-12-14 PROCEDURE — G8420 CALC BMI NORM PARAMETERS: HCPCS | Performed by: NURSE PRACTITIONER

## 2022-12-14 PROCEDURE — 99213 OFFICE O/P EST LOW 20 MIN: CPT | Performed by: NURSE PRACTITIONER

## 2022-12-14 PROCEDURE — 3017F COLORECTAL CA SCREEN DOC REV: CPT | Performed by: NURSE PRACTITIONER

## 2022-12-14 PROCEDURE — G8484 FLU IMMUNIZE NO ADMIN: HCPCS | Performed by: NURSE PRACTITIONER

## 2022-12-14 PROCEDURE — G8427 DOCREV CUR MEDS BY ELIG CLIN: HCPCS | Performed by: NURSE PRACTITIONER

## 2022-12-14 PROCEDURE — 1123F ACP DISCUSS/DSCN MKR DOCD: CPT | Performed by: NURSE PRACTITIONER

## 2022-12-14 ASSESSMENT — PATIENT HEALTH QUESTIONNAIRE - PHQ9
SUM OF ALL RESPONSES TO PHQ QUESTIONS 1-9: 0
1. LITTLE INTEREST OR PLEASURE IN DOING THINGS: 0
SUM OF ALL RESPONSES TO PHQ QUESTIONS 1-9: 0

## 2022-12-14 NOTE — PROGRESS NOTES
[facility] NOTE:   History of Present Illness:    CC: Thoracic MRI review    Sandy Caldwell is a 78 y.o. male with a known history of prostate cancer here to review his thoracic MRI. Patient states he still has this amount of pain between his shoulder blades and sometimes across his lower thoracic spine. The thoracic MRI reveals a chronic superior endplate compression fracture at T8 as well as a sclerotic T7 lesion most likely metastatic. Past Medical History:   Diagnosis Date    Anxiety     Arthritis     Elevated prostate specific antigen (PSA) 1/15/2014    Former cigar smoker     GERD (gastroesophageal reflux disease)     Hypertension     Hypertrophy of prostate with urinary obstruction and other lower urinary tract symptoms (LUTS) 1/15/2014    Prostate cancer ([address]       Past Surgical History:   Procedure Laterality Date    COLONOSCOPY      MALIGNANT SKIN LESION EXCISION      squamous cell - 2-3 times    PROSTATE BIOPSY, NEEDLE, SATURATION SAMPLING      TONSILLECTOMY  as child     Allergies   Allergen Reactions    Ciprofloxacin Other (See Comments)      Family History   Problem Relation Age of Onset    Heart Disease Father         pacemaker in his late [de-identified]      Social History     Socioeconomic History    Marital status:      Spouse name: Not on file    Number of children: Not on file    Years of education: Not on file    Highest education level: Not on file   Occupational History    Not on file   Tobacco Use    Smoking status: Former    Smokeless tobacco: Never   Vaping Use    Vaping Use: Never used   Substance and Sexual Activity    Alcohol use:  Yes     Alcohol/week: 6.0 - 8.0 standard drinks    Drug use: No    Sexual activity: Not on file   Other Topics Concern    Not on file   Social History Narrative    Not on file     Social Determinants of Health     Financial Resource Strain: Not on file   Food Insecurity: Not on file   Transportation Needs: Not on file Physical Activity: Not on file   Stress: Not on file   Social Connections: Not on file   Intimate Partner Violence: Not on file   Housing Stability: Not on file     Current Outpatient Medications   Medication Sig Dispense Refill    NUBEQA 300 MG TABS       leuprolide acetate (LUPRON) 11.25 MG KIT injection Inject 11.25 mg into the muscle      Multiple Vitamin (MULTIVITAMIN PO) Take by mouth      celecoxib (CELEBREX) 200 MG capsule Take 200 mg by mouth 2 times daily      fluticasone (FLONASE) 50 MCG/ACT nasal spray 2 sprays by Nasal route daily      potassium chloride (KLOR-CON M) 10 MEQ extended release tablet Take 10 mEq by mouth 2 times daily      chlorthalidone (HYGROTON) 25 MG tablet Take 25 mg by mouth daily      LORazepam (ATIVAN) 1 MG tablet Take 0.5 mg by mouth 2 times daily as needed. zolpidem (AMBIEN) 10 MG tablet Take 10 mg by mouth nightly. tiZANidine (ZANAFLEX) 2 MG tablet TAKE 1 TABLET BY MOUTH EVERY 8 HOURS AS NEEDED FOR MUSCLE SPASMS FOR UP TO 10 DAYS. famotidine (PEPCID) 40 MG tablet Take 40 mg by mouth daily      citalopram (CELEXA) 20 mg tablet Take 1 tablet by mouth daily 90 tablet 1     No current facility-administered medications for this visit. Patient Active Problem List   Diagnosis    Elevated prostate specific antigen (PSA)    Prostate cancer ([facility] [address]          ROS Review of Systems    Constitutional:                    No recent weight changes, fever, fatigue, sleep difficulties, loss of appetite   ENT/Mouth:  No hearing loss, No ringing in the ears, chronic sinus problem, nose bleeds,sore throat, voice change, hoarseness, swollen glands in neck, or difficulties with chewing and swallowing. Cardiovascular:  No chest pain/angina pectoris, palpitations, swelling of feet/ankles/hands, or calf pain while walking. Respiratory: No chronic or frequent coughs, spitting up blood, shortness of breath, No asthma, or wheezing.      Gastrointestinal: No a bdominal pain, heartburn, nausea, vomiting, constipation, or frequent diarrhea     Genitourinary: No frequent urination, burning or painful urination, or blood in urine     Musculoskeletal:   POS thoracic pain     Integument:   No rash/itching     Neurological:   Dizziness/vertigo, No numbness/tingling sensation, tremors, No weakness in limbs, frequent or recurring headaches, memory loss or confusion. Physical Exam:    General: No acute distress  Head normocephalic and atraumatic  Mood and affect appropriate  CV: Regular rate   Resp: No increased work of breathing  Skin: warm and dry   Awake, alert, and oriented   Speech fluent  Eyes open spontaneously   Face symmetric and tongue midline on protrusion  Sternocleidomastoid and trapezius 5/5  No mid-line cervical, thoracic, or lumbar tenderness to palpation   Patient with strength exam as follows:   Upper Extremities: Right Left      Deltoid  5 5    Biceps  5 5    Triceps  5 5      5 5   Hand Intrinsics  5 5  Wrist flexors/extensors  5 5     Lower Extremities:      Hip Flex 5 5    Quads  5 5    Hamstrings 5 5    Dorsiflex 5 5    Plantarflex 5 5    EHL  5 5  Sensation intact to light touch and pin-prick   DTR 2+  No clonus or babinski present   No Evans's sign present bilaterally   Gait normal    Assessment & Plan:  Dhruv Estes is a 78 y.o. male who presents to review his thoracic MRI. TIMOTHY Clement interpreted and reviewed the imaging and do not feel the patient would benefit from a neurosurgical intervention at this time. The MRI reveals that the slight T8 wedge compression fracture is old and most likely not the origin of the patient's current symptoms. Most likely the patient is experiencing some pain related to the T7 sclerotic lesion as well as some muscular pain and spasming. No diagnosis found. Notes are transcribed with [employer], a medical voice recording dictation service, and may contain minor errors.     Shawn Hernandes NP  [facility]  [facility]

## 2023-01-03 DIAGNOSIS — M17.11 ARTHRITIS OF RIGHT KNEE: Primary | ICD-10-CM

## 2023-01-09 DIAGNOSIS — C61 PROSTATE CANCER (HCC): Primary | ICD-10-CM

## 2023-01-10 ENCOUNTER — HOSPITAL ENCOUNTER (OUTPATIENT)
Dept: INFUSION THERAPY | Age: 78
Discharge: HOME | End: 2023-01-10
Payer: MEDICARE

## 2023-01-10 ENCOUNTER — HOSPITAL ENCOUNTER (OUTPATIENT)
Dept: LAB | Age: 78
Discharge: HOME | End: 2023-01-13
Payer: MEDICARE

## 2023-01-10 ENCOUNTER — OFFICE VISIT (OUTPATIENT)
Dept: ONCOLOGY | Age: 78
End: 2023-01-10
Payer: MEDICARE

## 2023-01-10 VITALS
HEIGHT: 69 IN | WEIGHT: 159.2 LBS | SYSTOLIC BLOOD PRESSURE: 156 MMHG | HEART RATE: 62 BPM | BODY MASS INDEX: 23.58 KG/M2 | DIASTOLIC BLOOD PRESSURE: 92 MMHG | OXYGEN SATURATION: 97 % | TEMPERATURE: 98 F | RESPIRATION RATE: 14 BRPM

## 2023-01-10 DIAGNOSIS — G89.29 CHRONIC THORACIC BACK PAIN, UNSPECIFIED BACK PAIN LATERALITY: ICD-10-CM

## 2023-01-10 DIAGNOSIS — M54.6 CHRONIC THORACIC BACK PAIN, UNSPECIFIED BACK PAIN LATERALITY: ICD-10-CM

## 2023-01-10 DIAGNOSIS — C61 PROSTATE CANCER (HCC): Primary | ICD-10-CM

## 2023-01-10 DIAGNOSIS — C79.51 METASTASIS TO BONE (HCC): ICD-10-CM

## 2023-01-10 DIAGNOSIS — C61 PROSTATE CANCER (HCC): ICD-10-CM

## 2023-01-10 DIAGNOSIS — R23.2 HOT FLASH IN MALE: ICD-10-CM

## 2023-01-10 LAB
ALBUMIN SERPL-MCNC: 3.8 G/DL (ref 3.2–4.6)
ALBUMIN/GLOB SERPL: 1.2 (ref 0.4–1.6)
ALP SERPL-CCNC: 64 U/L (ref 50–136)
ALT SERPL-CCNC: 21 U/L (ref 12–65)
ANION GAP SERPL CALC-SCNC: 7 MMOL/L (ref 2–11)
AST SERPL-CCNC: 14 U/L (ref 15–37)
BASOPHILS # BLD: 0 K/UL (ref 0–0.2)
BASOPHILS NFR BLD: 1 % (ref 0–2)
BILIRUB SERPL-MCNC: 1.6 MG/DL (ref 0.2–1.1)
BUN SERPL-MCNC: 30 MG/DL (ref 8–23)
CALCIUM SERPL-MCNC: 9.3 MG/DL (ref 8.3–10.4)
CHLORIDE SERPL-SCNC: 107 MMOL/L (ref 101–110)
CO2 SERPL-SCNC: 29 MMOL/L (ref 21–32)
CREAT SERPL-MCNC: 1.3 MG/DL (ref 0.8–1.5)
DIFFERENTIAL METHOD BLD: ABNORMAL
EOSINOPHIL # BLD: 0.2 K/UL (ref 0–0.8)
EOSINOPHIL NFR BLD: 3 % (ref 0.5–7.8)
ERYTHROCYTE [DISTWIDTH] IN BLOOD BY AUTOMATED COUNT: 13 % (ref 11.9–14.6)
GLOBULIN SER CALC-MCNC: 3.1 G/DL (ref 2.8–4.5)
GLUCOSE SERPL-MCNC: 102 MG/DL (ref 65–100)
HCT VFR BLD AUTO: 42.9 %
HGB BLD-MCNC: 14.2 G/DL (ref 13.6–17.2)
IMM GRANULOCYTES # BLD AUTO: 0 K/UL (ref 0–0.5)
IMM GRANULOCYTES NFR BLD AUTO: 0 % (ref 0–5)
LYMPHOCYTES # BLD: 2.5 K/UL (ref 0.5–4.6)
LYMPHOCYTES NFR BLD: 39 % (ref 13–44)
MCH RBC QN AUTO: 31.1 PG (ref 26.1–32.9)
MCHC RBC AUTO-ENTMCNC: 33.1 G/DL (ref 31.4–35)
MCV RBC AUTO: 94.1 FL (ref 82–102)
MONOCYTES # BLD: 0.5 K/UL (ref 0.1–1.3)
MONOCYTES NFR BLD: 7 % (ref 4–12)
NEUTS SEG # BLD: 3.3 K/UL (ref 1.7–8.2)
NEUTS SEG NFR BLD: 50 % (ref 43–78)
NRBC # BLD: 0 K/UL (ref 0–0.2)
PLATELET # BLD AUTO: 267 K/UL (ref 150–450)
PMV BLD AUTO: 9.3 FL (ref 9.4–12.3)
POTASSIUM SERPL-SCNC: 3.2 MMOL/L (ref 3.5–5.1)
PROT SERPL-MCNC: 6.9 G/DL (ref 6.3–8.2)
PSA SERPL-MCNC: <0.1 NG/ML
RBC # BLD AUTO: 4.56 M/UL (ref 4.23–5.6)
SODIUM SERPL-SCNC: 143 MMOL/L (ref 133–143)
WBC # BLD AUTO: 6.5 K/UL (ref 4.3–11.1)

## 2023-01-10 PROCEDURE — 6360000002 HC RX W HCPCS: Performed by: INTERNAL MEDICINE

## 2023-01-10 PROCEDURE — 84153 ASSAY OF PSA TOTAL: CPT

## 2023-01-10 PROCEDURE — G8484 FLU IMMUNIZE NO ADMIN: HCPCS | Performed by: NURSE PRACTITIONER

## 2023-01-10 PROCEDURE — 80053 COMPREHEN METABOLIC PANEL: CPT

## 2023-01-10 PROCEDURE — 85025 COMPLETE CBC W/AUTO DIFF WBC: CPT

## 2023-01-10 PROCEDURE — 36415 COLL VENOUS BLD VENIPUNCTURE: CPT

## 2023-01-10 PROCEDURE — 1123F ACP DISCUSS/DSCN MKR DOCD: CPT | Performed by: NURSE PRACTITIONER

## 2023-01-10 PROCEDURE — 99214 OFFICE O/P EST MOD 30 MIN: CPT | Performed by: NURSE PRACTITIONER

## 2023-01-10 PROCEDURE — 3017F COLORECTAL CA SCREEN DOC REV: CPT | Performed by: NURSE PRACTITIONER

## 2023-01-10 PROCEDURE — G8427 DOCREV CUR MEDS BY ELIG CLIN: HCPCS | Performed by: NURSE PRACTITIONER

## 2023-01-10 PROCEDURE — G8420 CALC BMI NORM PARAMETERS: HCPCS | Performed by: NURSE PRACTITIONER

## 2023-01-10 PROCEDURE — 1036F TOBACCO NON-USER: CPT | Performed by: NURSE PRACTITIONER

## 2023-01-10 PROCEDURE — 96402 CHEMO HORMON ANTINEOPL SQ/IM: CPT

## 2023-01-10 RX ORDER — SODIUM CHLORIDE 9 MG/ML
INJECTION, SOLUTION INTRAVENOUS CONTINUOUS
OUTPATIENT
Start: 2023-03-28

## 2023-01-10 RX ORDER — ONDANSETRON 2 MG/ML
8 INJECTION INTRAMUSCULAR; INTRAVENOUS
OUTPATIENT
Start: 2023-03-28

## 2023-01-10 RX ORDER — EPINEPHRINE 1 MG/ML
0.3 INJECTION, SOLUTION, CONCENTRATE INTRAVENOUS PRN
OUTPATIENT
Start: 2023-03-28

## 2023-01-10 RX ORDER — DIPHENHYDRAMINE HYDROCHLORIDE 50 MG/ML
50 INJECTION INTRAMUSCULAR; INTRAVENOUS
OUTPATIENT
Start: 2023-03-28

## 2023-01-10 RX ORDER — ALBUTEROL SULFATE 90 UG/1
4 AEROSOL, METERED RESPIRATORY (INHALATION) PRN
OUTPATIENT
Start: 2023-03-28

## 2023-01-10 RX ORDER — ACETAMINOPHEN 325 MG/1
650 TABLET ORAL
OUTPATIENT
Start: 2023-03-28

## 2023-01-10 RX ORDER — CITALOPRAM 20 MG/1
20 TABLET ORAL DAILY
Qty: 90 TABLET | Refills: 1 | Status: SHIPPED | OUTPATIENT
Start: 2023-01-10 | End: 2023-04-10

## 2023-01-10 RX ADMIN — LEUPROLIDE ACETATE 22.5 MG: KIT at 11:04

## 2023-01-10 ASSESSMENT — PATIENT HEALTH QUESTIONNAIRE - PHQ9
SUM OF ALL RESPONSES TO PHQ QUESTIONS 1-9: 0
SUM OF ALL RESPONSES TO PHQ QUESTIONS 1-9: 0
2. FEELING DOWN, DEPRESSED OR HOPELESS: 0
SUM OF ALL RESPONSES TO PHQ QUESTIONS 1-9: 0
SUM OF ALL RESPONSES TO PHQ QUESTIONS 1-9: 0
SUM OF ALL RESPONSES TO PHQ9 QUESTIONS 1 & 2: 0
1. LITTLE INTEREST OR PLEASURE IN DOING THINGS: 0

## 2023-01-10 NOTE — PROGRESS NOTES
Arrived to the infusion center. Lupron given. No complaints. Only issue is fatigue after injection. Aware of his next injection on 4/17 at 1030. Discharged ambulatory in satisfactory condition.

## 2023-01-10 NOTE — PROGRESS NOTES
[facility]: Office Visit Established Patient    Chief Complaint:    Chief Complaint   Patient presents with    Follow-up         History of Present Illness:  Mr. Shelly Staley is a 78 y.o. male who returns today for management of prostate cancer. He is a patient  of Dr. LIN TEJADA AT [place], previously followed by Dr. Jose L Amin at [facility]. He originally had a diagnosis of prostate cancer in 2015 when a biopsy showed Giulia 6 disease with a PSA of 4. He elected for watchful waiting at that time. In 2016, he transferred care to  Dr. LIN TEJADA AT [place] and PSA janelle to 7, then to 11.9, prompting repeat biopsy of the prostate that then showed Giulia 4+5 disease. He was also having some right hip pain and pelvic discomfort which prompted  systemic restaging, bone scan revealed some small suspicious uptake in the sternum and in the left proximal femur, with corresponding findings on CT, along with an asymmetrically enlarged right pelvic sidewall internal iliac lymph node measuring 17 mm. All told, these findings were felt to most likely represent metastatic disease. Dr. CEBALLOS [place] MALLORY AT [place] started him on Lupron with a 22.5 mg dose given on 10/3/16. He was referred to medical oncology for evaluation. We recommended short term observation with repeat  CT and bone scan after 3 months to determine whether these lesions responded to ADT, which would suggest stage IV disease. CT showed resolution of pelvic adenopathy but bone lesions are stable. PSA  is 0.6 consistent with excellent biochemical response. He is having pain in the left hip still. It is certainly possible that the bone lesions are not metastases, as one would have expected them to improve with ADT as the PSA and lymph node has done. We checked MRI of the left hip, which did not show bone metastases. Therefore, I believe the likelihood of these bone scan findings not representing metastases is quite high.   He is reluctant to consider any surgical options, so we recommended rad onc  referral for consideration of local therapy. This was completed in May 2017 and subsequently PSA dropped to 0, Lupron was continued until October 2018. 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 He has been hesitant to restart ADT given the known AEs, if his PET had been negative I would have felt more comfortable with this approach. However, with the two osseous lesions I would give stronger consideration to resuming ADT and even considering addition of androgen signaling inhibitor. I would also recommend adding antiresorptive therapy for his bone mets. He is agreeable to Lupron but would like to think about addition of the other two classes, this is reasonable since most of the clinical trials started several weeks or months after castrate sensitivity was confirmed. He would like to try a 1 month Lupron shot for the time being. PSA responded nicely, to 1.0 from 2.5, indicative castrate sensitivity. We discussed options, he agrees to continue with Lupron but wishes to continue monthly injections. He will agree to a trial of darolutamide, but refuses Xgeva due to the small volume osseous disease and the risk of side effects. He returns today for routine 3 month follow up on Nubeqa and next lupron. Since last seen he did meet with POA for his back pain, initially given steroids that were helpful, however pain returned after completion. MRI revealed a chronic endplate compression fracture of T8 and a T7 sclerotic lesion of which they feel his pain is associated with. His pain has improved with time and remains on celebrex. There are no GI or bowel complaints. Appetite is good and weight is stable. Mild fatigue is present, but he remains very active. There is no cough, shortness of breath, or edema. Hot flashes are ongoing, less severe with celexa. Denies any fevers or infectious symptoms. Review of Systems:  Constitutional: Positive for fatigue and hot flashes. HENT: Negative. Eyes: Negative. Respiratory: Negative. Cardiovascular: Negative. Gastrointestinal: Negative. Genitourinary: Negative. Musculoskeletal: Positive for back and rt knee pain. Skin: Negative. Neurological: Negative. Endo/Heme/Allergies: Negative. Psychiatric/Behavioral: Negative. All other systems reviewed and are negative. Allergies   Allergen Reactions    Ciprofloxacin Other (See Comments)     Past Medical History:   Diagnosis Date    Anxiety     Arthritis     Elevated prostate specific antigen (PSA) 1/15/2014    Former cigar smoker     GERD (gastroesophageal reflux disease)     Hypertension     Hypertrophy of prostate with urinary obstruction and other lower urinary tract symptoms (LUTS) 1/15/2014    Prostate cancer ([facility] [address]      Past Surgical History:   Procedure Laterality Date    COLONOSCOPY      MALIGNANT SKIN LESION EXCISION      squamous cell - 2-3 times    PROSTATE BIOPSY, NEEDLE, SATURATION SAMPLING      TONSILLECTOMY  as child     Family History   Problem Relation Age of Onset    Heart Disease Father         pacemaker in his late [de-identified]     Social History     Socioeconomic History    Marital status:      Spouse name: Not on file    Number of children: Not on file    Years of education: Not on file    Highest education level: Not on file   Occupational History    Not on file   Tobacco Use    Smoking status: Former    Smokeless tobacco: Never   Vaping Use    Vaping Use: Never used   Substance and Sexual Activity    Alcohol use:  Yes     Alcohol/week: 6.0 - 8.0 standard drinks    Drug use: No    Sexual activity: Not on file   Other Topics Concern    Not on file   Social History Narrative    Not on file     Social Determinants of Health     Financial Resource Strain: Not on file   Food Insecurity: Not on file   Transportation Needs: Not on file Physical Activity: Not on file   Stress: Not on file   Social Connections: Not on file   Intimate Partner Violence: Not on file   Housing Stability: Not on file     Current Outpatient Medications   Medication Sig Dispense Refill    NUBEQA 300 MG TABS       leuprolide acetate (LUPRON) 11.25 MG KIT injection Inject 11.25 mg into the muscle      Multiple Vitamin (MULTIVITAMIN PO) Take by mouth      celecoxib (CELEBREX) 200 MG capsule Take 200 mg by mouth 2 times daily      fluticasone (FLONASE) 50 MCG/ACT nasal spray 2 sprays by Nasal route daily      potassium chloride (KLOR-CON M) 10 MEQ extended release tablet Take 10 mEq by mouth 2 times daily      citalopram (CELEXA) 20 mg tablet Take 1 tablet by mouth daily 90 tablet 1    chlorthalidone (HYGROTON) 25 MG tablet Take 25 mg by mouth daily      LORazepam (ATIVAN) 1 MG tablet Take 0.5 mg by mouth 2 times daily as needed. zolpidem (AMBIEN) 10 MG tablet Take 10 mg by mouth nightly. tiZANidine (ZANAFLEX) 2 MG tablet TAKE 1 TABLET BY MOUTH EVERY 8 HOURS AS NEEDED FOR MUSCLE SPASMS FOR UP TO 10 DAYS. famotidine (PEPCID) 40 MG tablet Take 40 mg by mouth daily       No current facility-administered medications for this visit. OBJECTIVE:  BP (!) 156/92   Pulse 62   Temp 98 °F (36.7 °C) (Oral)   Resp 14   Ht 5' 9\" (1.753 m)   Wt 159 lb 3.2 oz (72.2 kg)   SpO2 97%   BMI 23.51 kg/m²   Pain Score:   2 (faitgue-0)     ECO    Physical Exam:  Constitutional: Well developed, well nourished male in no acute distress, sitting comfortably in the exam room chair. HEENT: Normocephalic and atraumatic. Sclerae anicteric. Neck supple without JVD. No thyromegaly present. Lymph node   Deferred   Skin Warm and dry. No bruising and no rash noted. No erythema. No pallor. Respiratory Lungs are clear to auscultation bilaterally without wheezes, rales or rhonchi, normal air exchange without accessory muscle use.     CVS Normal rate, regular rhythm and normal S1 and S2. No murmurs, gallops, or rubs. Neuro Grossly nonfocal with no obvious sensory or motor deficits. MSK Normal range of motion in general.  No edema and no tenderness. Psych Appropriate mood and affect.           Labs:  Recent Results (from the past 96 hour(s))   CBC with Auto Differential    Collection Time: 01/10/23  9:35 AM   Result Value Ref Range    WBC 6.5 4.3 - 11.1 K/uL    RBC 4.56 4.23 - 5.6 M/uL    Hemoglobin 14.2 13.6 - 17.2 g/dL    Hematocrit 42.9 %    MCV 94.1 82.0 - 102.0 FL    MCH 31.1 26.1 - 32.9 PG    MCHC 33.1 31.4 - 35.0 g/dL    RDW 13.0 11.9 - 14.6 %    Platelets 598 636 - 884 K/uL    MPV 9.3 (L) 9.4 - 12.3 FL    nRBC 0.00 0.0 - 0.2 K/uL    Seg Neutrophils 50 43 - 78 %    Lymphocytes 39 13 - 44 %    Monocytes 7 4.0 - 12.0 %    Eosinophils % 3 0.5 - 7.8 %    Basophils 1 0.0 - 2.0 %    Immature Granulocytes 0 0.0 - 5.0 %    Segs Absolute 3.3 1.7 - 8.2 K/UL    Absolute Lymph # 2.5 0.5 - 4.6 K/UL    Absolute Mono # 0.5 0.1 - 1.3 K/UL    Absolute Eos # 0.2 0.0 - 0.8 K/UL    Basophils Absolute 0.0 0.0 - 0.2 K/UL    Absolute Immature Granulocyte 0.0 0.0 - 0.5 K/UL    Differential Type AUTOMATED     Comprehensive Metabolic Panel    Collection Time: 01/10/23  9:35 AM   Result Value Ref Range    Sodium 143 133 - 143 mmol/L    Potassium 3.2 (L) 3.5 - 5.1 mmol/L    Chloride 107 101 - 110 mmol/L    CO2 29 21 - 32 mmol/L    Anion Gap 7 2 - 11 mmol/L    Glucose 102 (H) 65 - 100 mg/dL    BUN 30 (H) 8 - 23 MG/DL    Creatinine 1.30 0.8 - 1.5 MG/DL    Est, Glom Filt Rate 57 (L) >60 ml/min/1.73m2    Calcium 9.3 8.3 - 10.4 MG/DL    Total Bilirubin 1.6 (H) 0.2 - 1.1 MG/DL    ALT 21 12 - 65 U/L    AST 14 (L) 15 - 37 U/L    Alk Phosphatase 64 50 - 136 U/L    Total Protein 6.9 6.3 - 8.2 g/dL    Albumin 3.8 3.2 - 4.6 g/dL    Globulin 3.1 2.8 - 4.5 g/dL    Albumin/Globulin Ratio 1.2 0.4 - 1.6     PSA, Diagnostic    Collection Time: 01/10/23  9:35 AM   Result Value Ref Range    PSA <0.1 <4.0 ng/mL Imaging:  No results found. ASSESSMENT:   Diagnosis Orders   1. Prostate cancer ([address]        2. Metastasis to bone ([address]        3. Hot flash in male        4. Chronic thoracic back pain, unspecified back pain laterality                  PLAN:  Lab studies were personally reviewed. Prostate cancer:  Previously on watchful waiting with suspected indolent disease, but then repeat biopsy after PSA rise showed [place] 4+5 disease September 2016, now with 2 suspicious bone lesions  and a single 1.7 cm iliac node, suspicious for metastasis. PSA was 11.9 prior to Lupron injection given on 10/3/16. I agree that the imaging findings are quite concerning for distant disease and that treatment is now warranted. Because of the ambivalent  nature of the imaging findings without a biopsy of distant disease, we favored restaging imaging after approximately 3 months. CT showed resolution of pelvic adenopathy but bone lesions are stable. PSA dropped to 0.6 consistent with excellent biochemical  response. He was having pain in the left hip still, so we obtained MRI of the left hip, which did not show bone metastases. Therefore, I believe the likelihood of these bone scan findings not representing metastases is quite high. He was reluctant to  consider any surgical options, so I would recommend rad onc referral for consideration of local therapy. Completed IMRT May 2017 and on ADT from October 2016-2018, with PSA dropping to 0 after therapy. 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 He has been hesitant to restart ADT given the known AEs, if his PET had been negative I would have felt more comfortable with this approach. However, with the two osseous lesions I would give stronger consideration to resuming ADT and even considering addition of androgen signaling inhibitor.   I would also recommend adding antiresorptive therapy for his bone mets. He is agreeable to Lupron but would like to think about addition of the other two classes, this is reasonable since most of the clinical trials started several weeks or months after castrate sensitivity was confirmed. He would like to try a 1 month Lupron shot for the time being. PSA responded nicely, to 1.0 from 2.5, indicative castrate sensitivity. We discussed options, he agrees to continue with Lupron but wishes to continue monthly injections. He will agree to a trial of darolutamide, but refuses Xgeva due to the small volume osseous disease and the risk of side effects. He returns today for routine 3 month follow up on Nubeqa and next lupron. Since last seen he did meet with POA for his back pain, initially given steroids that were helpful, however pain returned after completion. MRI revealed a chronic endplate compression fracture of T8 and a T7 sclerotic lesion of which they feel his pain is associated with. His pain has improved with time and remains on celebrex. There are no GI or bowel complaints. Appetite is good and weight is stable. Mild fatigue is present, but he remains very active. There is no cough, shortness of breath, or edema. Hot flashes are ongoing, less severe with celexa. Denies any fevers or infectious symptoms. Labs reviewed, PSA <0.1. He agrees to proceed with next lupron today, but in the future desires a break, continue on Nubeqa BID as planned. All questions were asked and answered to the best of my ability. F/u 12 weeks for OV, Lupron, and further discussion of Nubeqa and consideration of Xgeva.            KESHA Krishnamurthy [facility]ology [facility] Oncology  [address] [address]  Office : [phone]  Fax : [phone]

## 2023-01-12 ENCOUNTER — OFFICE VISIT (OUTPATIENT)
Dept: ORTHOPEDIC SURGERY | Age: 78
End: 2023-01-12

## 2023-01-12 DIAGNOSIS — M17.11 UNILATERAL PRIMARY OSTEOARTHRITIS, RIGHT KNEE: Primary | ICD-10-CM

## 2023-01-12 RX ORDER — HYALURONATE SODIUM 10 MG/ML
20 SYRINGE (ML) INTRAARTICULAR ONCE
Status: COMPLETED | OUTPATIENT
Start: 2023-01-12 | End: 2023-01-12

## 2023-01-12 RX ADMIN — Medication 20 MG: at 11:15

## 2023-01-12 NOTE — PROGRESS NOTES
Name: Bonita Washington  YOB: 1945  Gender: male  MRN: [id number]      CC: Knee Pain (R Knee )       HPI: Bonita Washington is a 78 y.o. male who presents with Knee Pain (R Knee )  He is here today for #1 Euflexxa injection. Last injection was in November of 2021. He got good relief from the previous series has had some return of knee pain over the past few weeks and wants to begin another viscosupplementation series. Physical Examination:  General: no acute distress  right Knee: Exam is unchanged, the knee continues to be painful with palpation and range of motion. There is no effusion or concern for infection. Assessment:   1. Unilateral primary osteoarthritis, right knee         Plan:     Right knee injected from a anterior lateral approach with 2 mL Euflexxa  viscosupplementation after sterile prep. Patient tolerated the procedure well was given postinjection flare precautions. Follow up 1 week        Jaun Sheth MD, [facility] and Sports Medicine

## 2023-01-19 ENCOUNTER — OFFICE VISIT (OUTPATIENT)
Dept: ORTHOPEDIC SURGERY | Age: 78
End: 2023-01-19

## 2023-01-19 DIAGNOSIS — M17.11 ARTHRITIS OF RIGHT KNEE: Primary | ICD-10-CM

## 2023-01-19 RX ORDER — HYALURONATE SODIUM 10 MG/ML
20 SYRINGE (ML) INTRAARTICULAR ONCE
Status: COMPLETED | OUTPATIENT
Start: 2023-01-19 | End: 2023-01-19

## 2023-01-19 RX ORDER — CELECOXIB 200 MG/1
200 CAPSULE ORAL 2 TIMES DAILY
Qty: 30 CAPSULE | Refills: 3 | Status: SHIPPED | OUTPATIENT
Start: 2023-01-19

## 2023-01-19 RX ADMIN — Medication 20 MG: at 10:49

## 2023-01-19 NOTE — PROGRESS NOTES
Name: Dipti Champagne  YOB: 1945  Gender: male  MRN: [id number]      CC: Knee Pain (R knee )       HPI: Dipti Champagne is a 78 y.o. male who presents with Knee Pain (R knee )  For his second Euflexxa injection. He reported no new complaints today. Physical Examination:  General: no acute distress  right Knee: Exam is unchanged, the knee continues to be painful with palpation and range of motion. There is no effusion or concern for infection. Assessment:   1. Arthritis of right knee         Plan:     Right knee injected from a anterior lateral approach with 2 mL Euflexxa viscosupplementation after sterile prep. Patient tolerated the procedure well was given postinjection flare precautions. Follow up 1 week        Jaun Fisher MD, [address] [place] [address] and Sports Medicine

## 2023-01-26 ENCOUNTER — OFFICE VISIT (OUTPATIENT)
Dept: ORTHOPEDIC SURGERY | Age: 78
End: 2023-01-26
Payer: MEDICARE

## 2023-01-26 DIAGNOSIS — M17.11 UNILATERAL PRIMARY OSTEOARTHRITIS, RIGHT KNEE: Primary | ICD-10-CM

## 2023-01-26 DIAGNOSIS — M17.11 ARTHRITIS OF RIGHT KNEE: ICD-10-CM

## 2023-01-26 PROCEDURE — 20610 DRAIN/INJ JOINT/BURSA W/O US: CPT | Performed by: SPECIALIST/TECHNOLOGIST

## 2023-01-26 RX ORDER — CELECOXIB 200 MG/1
200 CAPSULE ORAL 2 TIMES DAILY
Qty: 180 CAPSULE | Refills: 0 | Status: SHIPPED | OUTPATIENT
Start: 2023-01-26

## 2023-01-26 RX ORDER — HYALURONATE SODIUM 10 MG/ML
20 SYRINGE (ML) INTRAARTICULAR ONCE
Status: COMPLETED | OUTPATIENT
Start: 2023-01-26 | End: 2023-01-26

## 2023-01-26 RX ADMIN — Medication 20 MG: at 10:37

## 2023-01-26 NOTE — PROGRESS NOTES
Name: Leobardo Velasco  YOB: 1945  Gender: male  MRN: [id number]      CC: Injections (R knee (Euflexxa #3))       HPI: Leobardo Velasco is a 78 y.o. male who presents with Injections (R knee (Euflexxa #3))  He returns for his last round of Euflexxa injections in the right knee. Notes improvement in his right knee pain but wishes to proceed with final injection. He is requesting a refill of his Celebrex which I will provide him today. Physical Examination:  General: no acute distress  right Knee: Exam is unchanged, the knee continues to be painful with palpation and range of motion. There is no effusion or concern for infection. Assessment:   1. Unilateral primary osteoarthritis, right knee          Plan:     Right knee injected from a anterior lateral approach with 2 cc Euflexxa viscosupplementation after sterile prep. Patient tolerated the procedure well was given postinjection flare precautions.       Follow up       Achilles LANDEN Baum    Orthopaedics and Sports Medicine

## 2023-01-31 ENCOUNTER — TELEPHONE (OUTPATIENT)
Dept: ONCOLOGY | Age: 78
End: 2023-01-31

## 2023-01-31 DIAGNOSIS — C79.51 METASTASIS TO BONE (HCC): ICD-10-CM

## 2023-01-31 DIAGNOSIS — C61 PROSTATE CANCER (HCC): ICD-10-CM

## 2023-01-31 NOTE — TELEPHONE ENCOUNTER
I reviewed the chart and the patient is to continue Nubeqa BID. Script sent to [facility]. Msg to [facility].

## 2023-02-01 NOTE — PROGRESS NOTES
Per [facility]: \"Good Morning,     We received a Nubeqa prescription written by Dr. Juanito Onofre for Keeley Casillas (1945). His insurance plan is requiring a prior authorization. This has been started in Cover My Meds. The key is [id number]. Please assist with completing and submitting to the plan. If there are any issues with Cover My Meds you can call the plan at [phone] to obtain the PA. Please advise once approved. Thank you in advance for all of your help! \"    PA initiated and per CMM:       Per [facility]: \"Good Afternoon,     So sorry about the confusion. Mr. Neel Ty is ready to schedule with a copay of $100. I will reach out to our Copay Assistance Team to see if there is any assistance available for him. I will update you on that as soon as they respond. \"

## 2023-04-28 DIAGNOSIS — C61 PROSTATE CANCER (HCC): Primary | ICD-10-CM

## 2023-05-02 ENCOUNTER — OFFICE VISIT (OUTPATIENT)
Dept: ONCOLOGY | Age: 78
End: 2023-05-02
Payer: MEDICARE

## 2023-05-02 ENCOUNTER — HOSPITAL ENCOUNTER (OUTPATIENT)
Dept: LAB | Age: 78
Discharge: HOME | End: 2023-05-05
Payer: MEDICARE

## 2023-05-02 ENCOUNTER — HOSPITAL ENCOUNTER (OUTPATIENT)
Dept: INFUSION THERAPY | Age: 78
Discharge: HOME | End: 2023-05-02
Payer: MEDICARE

## 2023-05-02 VITALS
WEIGHT: 152.4 LBS | OXYGEN SATURATION: 99 % | BODY MASS INDEX: 22.57 KG/M2 | RESPIRATION RATE: 16 BRPM | HEIGHT: 69 IN | TEMPERATURE: 98.2 F | HEART RATE: 58 BPM | DIASTOLIC BLOOD PRESSURE: 86 MMHG | SYSTOLIC BLOOD PRESSURE: 145 MMHG

## 2023-05-02 DIAGNOSIS — C79.51 METASTASIS TO BONE (HCC): ICD-10-CM

## 2023-05-02 DIAGNOSIS — R23.2 HOT FLASH IN MALE: ICD-10-CM

## 2023-05-02 DIAGNOSIS — C61 PROSTATE CANCER (HCC): ICD-10-CM

## 2023-05-02 DIAGNOSIS — C61 PROSTATE CANCER (HCC): Primary | ICD-10-CM

## 2023-05-02 LAB
ALBUMIN SERPL-MCNC: 3.9 G/DL (ref 3.2–4.6)
ALBUMIN/GLOB SERPL: 1.3 (ref 0.4–1.6)
ALP SERPL-CCNC: 62 U/L (ref 50–136)
ALT SERPL-CCNC: 23 U/L (ref 12–65)
ANION GAP SERPL CALC-SCNC: 5 MMOL/L (ref 2–11)
AST SERPL-CCNC: 22 U/L (ref 15–37)
BASOPHILS # BLD: 0 K/UL (ref 0–0.2)
BASOPHILS NFR BLD: 1 % (ref 0–2)
BILIRUB SERPL-MCNC: 1.6 MG/DL (ref 0.2–1.1)
BUN SERPL-MCNC: 32 MG/DL (ref 8–23)
CALCIUM SERPL-MCNC: 9.6 MG/DL (ref 8.3–10.4)
CHLORIDE SERPL-SCNC: 106 MMOL/L (ref 101–110)
CO2 SERPL-SCNC: 30 MMOL/L (ref 21–32)
CREAT SERPL-MCNC: 1.4 MG/DL (ref 0.8–1.5)
DIFFERENTIAL METHOD BLD: NORMAL
EOSINOPHIL # BLD: 0.2 K/UL (ref 0–0.8)
EOSINOPHIL NFR BLD: 2 % (ref 0.5–7.8)
ERYTHROCYTE [DISTWIDTH] IN BLOOD BY AUTOMATED COUNT: 13.1 % (ref 11.9–14.6)
GLOBULIN SER CALC-MCNC: 3 G/DL (ref 2.8–4.5)
GLUCOSE SERPL-MCNC: 121 MG/DL (ref 65–100)
HCT VFR BLD AUTO: 42.1 % (ref 41.1–50.3)
HGB BLD-MCNC: 13.8 G/DL (ref 13.6–17.2)
IMM GRANULOCYTES # BLD AUTO: 0 K/UL (ref 0–0.5)
IMM GRANULOCYTES NFR BLD AUTO: 0 % (ref 0–5)
LYMPHOCYTES # BLD: 2.6 K/UL (ref 0.5–4.6)
LYMPHOCYTES NFR BLD: 30 % (ref 13–44)
MCH RBC QN AUTO: 31.2 PG (ref 26.1–32.9)
MCHC RBC AUTO-ENTMCNC: 32.8 G/DL (ref 31.4–35)
MCV RBC AUTO: 95 FL (ref 82–102)
MONOCYTES # BLD: 0.6 K/UL (ref 0.1–1.3)
MONOCYTES NFR BLD: 7 % (ref 4–12)
NEUTS SEG # BLD: 5.1 K/UL (ref 1.7–8.2)
NEUTS SEG NFR BLD: 60 % (ref 43–78)
NRBC # BLD: 0 K/UL (ref 0–0.2)
PLATELET # BLD AUTO: 256 K/UL (ref 150–450)
PMV BLD AUTO: 9.7 FL (ref 9.4–12.3)
POTASSIUM SERPL-SCNC: 4.3 MMOL/L (ref 3.5–5.1)
PROT SERPL-MCNC: 6.9 G/DL (ref 6.3–8.2)
PSA SERPL-MCNC: <0.1 NG/ML
RBC # BLD AUTO: 4.43 M/UL (ref 4.23–5.6)
SODIUM SERPL-SCNC: 141 MMOL/L (ref 133–143)
WBC # BLD AUTO: 8.5 K/UL (ref 4.3–11.1)

## 2023-05-02 PROCEDURE — 99214 OFFICE O/P EST MOD 30 MIN: CPT | Performed by: NURSE PRACTITIONER

## 2023-05-02 PROCEDURE — G8427 DOCREV CUR MEDS BY ELIG CLIN: HCPCS | Performed by: NURSE PRACTITIONER

## 2023-05-02 PROCEDURE — 84153 ASSAY OF PSA TOTAL: CPT

## 2023-05-02 PROCEDURE — 3017F COLORECTAL CA SCREEN DOC REV: CPT | Performed by: NURSE PRACTITIONER

## 2023-05-02 PROCEDURE — 96402 CHEMO HORMON ANTINEOPL SQ/IM: CPT

## 2023-05-02 PROCEDURE — 1036F TOBACCO NON-USER: CPT | Performed by: NURSE PRACTITIONER

## 2023-05-02 PROCEDURE — 36415 COLL VENOUS BLD VENIPUNCTURE: CPT

## 2023-05-02 PROCEDURE — G8420 CALC BMI NORM PARAMETERS: HCPCS | Performed by: NURSE PRACTITIONER

## 2023-05-02 PROCEDURE — 6360000002 HC RX W HCPCS: Performed by: INTERNAL MEDICINE

## 2023-05-02 PROCEDURE — 85025 COMPLETE CBC W/AUTO DIFF WBC: CPT

## 2023-05-02 PROCEDURE — 1123F ACP DISCUSS/DSCN MKR DOCD: CPT | Performed by: NURSE PRACTITIONER

## 2023-05-02 PROCEDURE — 80053 COMPREHEN METABOLIC PANEL: CPT

## 2023-05-02 RX ORDER — ACETAMINOPHEN 325 MG/1
650 TABLET ORAL
OUTPATIENT
Start: 2023-06-27

## 2023-05-02 RX ORDER — EPINEPHRINE 1 MG/ML
0.3 INJECTION, SOLUTION, CONCENTRATE INTRAVENOUS PRN
Status: DISCONTINUED | OUTPATIENT
Start: 2023-05-02 | End: 2023-05-03 | Stop reason: HOSPADM

## 2023-05-02 RX ORDER — ALBUTEROL SULFATE 90 UG/1
4 AEROSOL, METERED RESPIRATORY (INHALATION) PRN
OUTPATIENT
Start: 2023-06-27

## 2023-05-02 RX ORDER — EPINEPHRINE 1 MG/ML
0.3 INJECTION, SOLUTION, CONCENTRATE INTRAVENOUS PRN
OUTPATIENT
Start: 2023-06-27

## 2023-05-02 RX ORDER — SODIUM CHLORIDE 9 MG/ML
INJECTION, SOLUTION INTRAVENOUS CONTINUOUS
Status: DISCONTINUED | OUTPATIENT
Start: 2023-05-02 | End: 2023-05-03 | Stop reason: HOSPADM

## 2023-05-02 RX ORDER — DIPHENHYDRAMINE HYDROCHLORIDE 50 MG/ML
50 INJECTION INTRAMUSCULAR; INTRAVENOUS
Status: DISCONTINUED | OUTPATIENT
Start: 2023-05-02 | End: 2023-05-03 | Stop reason: HOSPADM

## 2023-05-02 RX ORDER — ONDANSETRON 2 MG/ML
8 INJECTION INTRAMUSCULAR; INTRAVENOUS
OUTPATIENT
Start: 2023-06-27

## 2023-05-02 RX ORDER — ONDANSETRON 2 MG/ML
8 INJECTION INTRAMUSCULAR; INTRAVENOUS
Status: DISCONTINUED | OUTPATIENT
Start: 2023-05-02 | End: 2023-05-03 | Stop reason: HOSPADM

## 2023-05-02 RX ORDER — DIPHENHYDRAMINE HYDROCHLORIDE 50 MG/ML
50 INJECTION INTRAMUSCULAR; INTRAVENOUS
OUTPATIENT
Start: 2023-06-27

## 2023-05-02 RX ORDER — ACETAMINOPHEN 325 MG/1
650 TABLET ORAL
Status: DISCONTINUED | OUTPATIENT
Start: 2023-05-02 | End: 2023-05-03 | Stop reason: HOSPADM

## 2023-05-02 RX ORDER — ALBUTEROL SULFATE 90 UG/1
4 AEROSOL, METERED RESPIRATORY (INHALATION) PRN
Status: DISCONTINUED | OUTPATIENT
Start: 2023-05-02 | End: 2023-05-03 | Stop reason: HOSPADM

## 2023-05-02 RX ORDER — SODIUM CHLORIDE 9 MG/ML
INJECTION, SOLUTION INTRAVENOUS CONTINUOUS
OUTPATIENT
Start: 2023-06-27

## 2023-05-02 RX ADMIN — LEUPROLIDE ACETATE 22.5 MG: KIT at 13:42

## 2023-05-02 ASSESSMENT — PATIENT HEALTH QUESTIONNAIRE - PHQ9
SUM OF ALL RESPONSES TO PHQ QUESTIONS 1-9: 0
SUM OF ALL RESPONSES TO PHQ QUESTIONS 1-9: 0
2. FEELING DOWN, DEPRESSED OR HOPELESS: 0
SUM OF ALL RESPONSES TO PHQ QUESTIONS 1-9: 0
SUM OF ALL RESPONSES TO PHQ QUESTIONS 1-9: 0

## 2023-05-02 NOTE — PROGRESS NOTES
[facility] Hematology and Oncology: Office Visit Established Patient    Chief Complaint:    Chief Complaint   Patient presents with    Follow-up         History of Present Illness:  Mr. Kelsie Morrell is a 78 y.o. male who returns today for management of prostate cancer. He is a patient  of Dr. LIN TEJADA AT [place], previously followed by Dr. Dakotah Escalona at [facility]. He originally had a diagnosis of prostate cancer in 2015 when a biopsy showed [place] 6 disease with a PSA of 4. He elected for watchful waiting at that time. In 2016, he transferred care to  Dr. LIN TEJADA AT [place] and PSA janelle to 7, then to 11.9, prompting repeat biopsy of the prostate that then showed [place] 4+5 disease. He was also having some right hip pain and pelvic discomfort which prompted  systemic restaging, bone scan revealed some small suspicious uptake in the sternum and in the left proximal femur, with corresponding findings on CT, along with an asymmetrically enlarged right pelvic sidewall internal iliac lymph node measuring 17 mm. All told, these findings were felt to most likely represent metastatic disease. Dr. LIN TEJADA AT [place] started him on Lupron with a 22.5 mg dose given on 10/3/16. He was referred to medical oncology for evaluation. We recommended short term observation with repeat  CT and bone scan after 3 months to determine whether these lesions responded to ADT, which would suggest stage IV disease. CT showed resolution of pelvic adenopathy but bone lesions are stable. PSA  is 0.6 consistent with excellent biochemical response. He is having pain in the left hip still. It is certainly possible that the bone lesions are not metastases, as one would have expected them to improve with ADT as the PSA and lymph node has done. We checked MRI of the left hip, which did not show bone metastases. Therefore, I believe the likelihood of these bone scan findings not representing metastases is quite high.   He is reluctant to consider any surgical options, so we recommended rad

## 2023-06-20 ENCOUNTER — TELEPHONE (OUTPATIENT)
Dept: ONCOLOGY | Age: 78
End: 2023-06-20

## 2023-06-21 DIAGNOSIS — C61 PROSTATE CANCER (HCC): ICD-10-CM

## 2023-06-21 DIAGNOSIS — C79.51 METASTASIS TO BONE (HCC): ICD-10-CM

## 2023-06-21 RX ORDER — DAROLUTAMIDE 300 MG/1
TABLET, FILM COATED ORAL
Qty: 360 TABLET | OUTPATIENT
Start: 2023-06-21

## 2023-06-21 NOTE — TELEPHONE ENCOUNTER
Chart reviewed - refills sent to [facility] who then arranges shipment to pt via [address]

## 2023-07-28 DIAGNOSIS — C61 PROSTATE CANCER (HCC): Primary | ICD-10-CM

## 2023-07-28 DIAGNOSIS — C79.51 METASTASIS TO BONE (HCC): ICD-10-CM

## 2023-08-01 ENCOUNTER — HOSPITAL ENCOUNTER (OUTPATIENT)
Dept: LAB | Age: 78
Discharge: HOME | End: 2023-08-04
Payer: MEDICARE

## 2023-08-01 ENCOUNTER — OFFICE VISIT (OUTPATIENT)
Dept: ONCOLOGY | Age: 78
End: 2023-08-01
Payer: MEDICARE

## 2023-08-01 ENCOUNTER — HOSPITAL ENCOUNTER (OUTPATIENT)
Dept: INFUSION THERAPY | Age: 78
Discharge: HOME | End: 2023-08-01
Payer: MEDICARE

## 2023-08-01 VITALS
BODY MASS INDEX: 22.51 KG/M2 | OXYGEN SATURATION: 100 % | RESPIRATION RATE: 12 BRPM | TEMPERATURE: 97.9 F | HEIGHT: 69 IN | HEART RATE: 61 BPM | SYSTOLIC BLOOD PRESSURE: 148 MMHG | WEIGHT: 152 LBS | DIASTOLIC BLOOD PRESSURE: 77 MMHG

## 2023-08-01 DIAGNOSIS — C61 PROSTATE CANCER (HCC): Primary | ICD-10-CM

## 2023-08-01 DIAGNOSIS — C61 PROSTATE CANCER (HCC): ICD-10-CM

## 2023-08-01 DIAGNOSIS — C79.51 METASTASIS TO BONE (HCC): ICD-10-CM

## 2023-08-01 LAB
ALBUMIN SERPL-MCNC: 3.9 G/DL (ref 3.2–4.6)
ALBUMIN/GLOB SERPL: 1.2 (ref 0.4–1.6)
ALP SERPL-CCNC: 63 U/L (ref 50–136)
ALT SERPL-CCNC: 27 U/L (ref 12–65)
ANION GAP SERPL CALC-SCNC: 5 MMOL/L (ref 2–11)
AST SERPL-CCNC: 20 U/L (ref 15–37)
BASOPHILS # BLD: 0 K/UL (ref 0–0.2)
BASOPHILS NFR BLD: 0 % (ref 0–2)
BILIRUB SERPL-MCNC: 1.4 MG/DL (ref 0.2–1.1)
BUN SERPL-MCNC: 33 MG/DL (ref 8–23)
CALCIUM SERPL-MCNC: 9.9 MG/DL (ref 8.3–10.4)
CHLORIDE SERPL-SCNC: 104 MMOL/L (ref 101–110)
CO2 SERPL-SCNC: 28 MMOL/L (ref 21–32)
CREAT SERPL-MCNC: 1.4 MG/DL (ref 0.8–1.5)
DIFFERENTIAL METHOD BLD: ABNORMAL
EOSINOPHIL # BLD: 0.2 K/UL (ref 0–0.8)
EOSINOPHIL NFR BLD: 2 % (ref 0.5–7.8)
ERYTHROCYTE [DISTWIDTH] IN BLOOD BY AUTOMATED COUNT: 12.6 % (ref 11.9–14.6)
GLOBULIN SER CALC-MCNC: 3.2 G/DL (ref 2.8–4.5)
GLUCOSE SERPL-MCNC: 125 MG/DL (ref 65–100)
HCT VFR BLD AUTO: 42.2 % (ref 41.1–50.3)
HGB BLD-MCNC: 13.8 G/DL (ref 13.6–17.2)
IMM GRANULOCYTES # BLD AUTO: 0 K/UL (ref 0–0.5)
IMM GRANULOCYTES NFR BLD AUTO: 0 % (ref 0–5)
LYMPHOCYTES # BLD: 2.4 K/UL (ref 0.5–4.6)
LYMPHOCYTES NFR BLD: 29 % (ref 13–44)
MCH RBC QN AUTO: 31.2 PG (ref 26.1–32.9)
MCHC RBC AUTO-ENTMCNC: 32.7 G/DL (ref 31.4–35)
MCV RBC AUTO: 95.5 FL (ref 82–102)
MONOCYTES # BLD: 0.6 K/UL (ref 0.1–1.3)
MONOCYTES NFR BLD: 7 % (ref 4–12)
NEUTS SEG # BLD: 5.2 K/UL (ref 1.7–8.2)
NEUTS SEG NFR BLD: 62 % (ref 43–78)
NRBC # BLD: 0 K/UL (ref 0–0.2)
PLATELET # BLD AUTO: 264 K/UL (ref 150–450)
PMV BLD AUTO: 9.1 FL (ref 9.4–12.3)
POTASSIUM SERPL-SCNC: 4.4 MMOL/L (ref 3.5–5.1)
PROT SERPL-MCNC: 7.1 G/DL (ref 6.3–8.2)
PSA SERPL-MCNC: <0.1 NG/ML
RBC # BLD AUTO: 4.42 M/UL (ref 4.23–5.6)
SODIUM SERPL-SCNC: 137 MMOL/L (ref 133–143)
WBC # BLD AUTO: 8.4 K/UL (ref 4.3–11.1)

## 2023-08-01 PROCEDURE — 85025 COMPLETE CBC W/AUTO DIFF WBC: CPT

## 2023-08-01 PROCEDURE — 96402 CHEMO HORMON ANTINEOPL SQ/IM: CPT

## 2023-08-01 PROCEDURE — 6360000002 HC RX W HCPCS: Performed by: INTERNAL MEDICINE

## 2023-08-01 PROCEDURE — G8420 CALC BMI NORM PARAMETERS: HCPCS | Performed by: INTERNAL MEDICINE

## 2023-08-01 PROCEDURE — 36415 COLL VENOUS BLD VENIPUNCTURE: CPT

## 2023-08-01 PROCEDURE — 99214 OFFICE O/P EST MOD 30 MIN: CPT | Performed by: INTERNAL MEDICINE

## 2023-08-01 PROCEDURE — 1036F TOBACCO NON-USER: CPT | Performed by: INTERNAL MEDICINE

## 2023-08-01 PROCEDURE — G8428 CUR MEDS NOT DOCUMENT: HCPCS | Performed by: INTERNAL MEDICINE

## 2023-08-01 PROCEDURE — 84153 ASSAY OF PSA TOTAL: CPT

## 2023-08-01 PROCEDURE — 80053 COMPREHEN METABOLIC PANEL: CPT

## 2023-08-01 PROCEDURE — 1123F ACP DISCUSS/DSCN MKR DOCD: CPT | Performed by: INTERNAL MEDICINE

## 2023-08-01 RX ORDER — ONDANSETRON 2 MG/ML
8 INJECTION INTRAMUSCULAR; INTRAVENOUS
OUTPATIENT
Start: 2023-10-17

## 2023-08-01 RX ORDER — DIPHENHYDRAMINE HYDROCHLORIDE 50 MG/ML
50 INJECTION INTRAMUSCULAR; INTRAVENOUS
OUTPATIENT
Start: 2023-10-17

## 2023-08-01 RX ORDER — ALBUTEROL SULFATE 90 UG/1
4 AEROSOL, METERED RESPIRATORY (INHALATION) PRN
Status: DISCONTINUED | OUTPATIENT
Start: 2023-08-01 | End: 2023-08-02 | Stop reason: HOSPADM

## 2023-08-01 RX ORDER — DIPHENHYDRAMINE HYDROCHLORIDE 50 MG/ML
50 INJECTION INTRAMUSCULAR; INTRAVENOUS
Status: DISCONTINUED | OUTPATIENT
Start: 2023-08-01 | End: 2023-08-02 | Stop reason: HOSPADM

## 2023-08-01 RX ORDER — ACETAMINOPHEN 325 MG/1
650 TABLET ORAL
OUTPATIENT
Start: 2023-10-17

## 2023-08-01 RX ORDER — ONDANSETRON 2 MG/ML
8 INJECTION INTRAMUSCULAR; INTRAVENOUS
Status: DISCONTINUED | OUTPATIENT
Start: 2023-08-01 | End: 2023-08-02 | Stop reason: HOSPADM

## 2023-08-01 RX ORDER — EPINEPHRINE 1 MG/ML
0.3 INJECTION, SOLUTION, CONCENTRATE INTRAVENOUS PRN
Status: DISCONTINUED | OUTPATIENT
Start: 2023-08-01 | End: 2023-08-02 | Stop reason: HOSPADM

## 2023-08-01 RX ORDER — SODIUM CHLORIDE 9 MG/ML
INJECTION, SOLUTION INTRAVENOUS CONTINUOUS
OUTPATIENT
Start: 2023-10-17

## 2023-08-01 RX ORDER — EPINEPHRINE 1 MG/ML
0.3 INJECTION, SOLUTION, CONCENTRATE INTRAVENOUS PRN
OUTPATIENT
Start: 2023-10-17

## 2023-08-01 RX ORDER — ALBUTEROL SULFATE 90 UG/1
4 AEROSOL, METERED RESPIRATORY (INHALATION) PRN
OUTPATIENT
Start: 2023-10-17

## 2023-08-01 RX ORDER — ACETAMINOPHEN 325 MG/1
650 TABLET ORAL
Status: DISCONTINUED | OUTPATIENT
Start: 2023-08-01 | End: 2023-08-02 | Stop reason: HOSPADM

## 2023-08-01 RX ADMIN — LEUPROLIDE ACETATE 22.5 MG: KIT at 12:29

## 2023-08-01 ASSESSMENT — PATIENT HEALTH QUESTIONNAIRE - PHQ9
SUM OF ALL RESPONSES TO PHQ9 QUESTIONS 1 & 2: 0
SUM OF ALL RESPONSES TO PHQ QUESTIONS 1-9: 0
SUM OF ALL RESPONSES TO PHQ QUESTIONS 1-9: 0
1. LITTLE INTEREST OR PLEASURE IN DOING THINGS: 0
SUM OF ALL RESPONSES TO PHQ QUESTIONS 1-9: 0
2. FEELING DOWN, DEPRESSED OR HOPELESS: 0
SUM OF ALL RESPONSES TO PHQ QUESTIONS 1-9: 0

## 2023-08-01 NOTE — PROGRESS NOTES
cancer ([address])        2. Metastasis to bone [facility])                    PLAN:  Lab studies were personally reviewed. Prostate cancer:  Previously on watchful waiting with suspected indolent disease, but then repeat biopsy after PSA rise showed [place] 4+5 disease September 2016, now with 2 suspicious bone lesions  and a single 1.7 cm iliac node, suspicious for metastasis. PSA was 11.9 prior to Lupron injection given on 10/3/16. I agree that the imaging findings are quite concerning for distant disease and that treatment is now warranted. Because of the ambivalent  nature of the imaging findings without a biopsy of distant disease, we favored restaging imaging after approximately 3 months. CT showed resolution of pelvic adenopathy but bone lesions are stable. PSA dropped to 0.6 consistent with excellent biochemical  response. He was having pain in the left hip still, so we obtained MRI of the left hip, which did not show bone metastases. Therefore, I believe the likelihood of these bone scan findings not representing metastases is quite high. He was reluctant to  consider any surgical options, so I would recommend rad onc referral for consideration of local therapy. Completed IMRT May 2017 and on ADT from October 2016-2018, with PSA dropping to 0 after therapy. 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 He has been hesitant to restart ADT given the known AEs, if his PET had been negative I would have felt more comfortable with this approach. However, with the two osseous lesions I would give stronger consideration to resuming ADT and even considering addition of androgen signaling inhibitor. I would also recommend adding antiresorptive therapy for his bone mets.   He is agreeable to Lupron but would like to think about addition of the other two classes, this is reasonable

## 2023-08-01 NOTE — PATIENT INSTRUCTIONS
Patient Instructions from Today's Visit    Reason for Visit:  Follow up Prostate Cancer     Diagnosis Information:  [email or web address]/. net/about-us/asco-answers-patient-education-materials/ugam-bzvneuf-bywa-sheets    Plan:  Lab results reviewed     Follow Up: Follow up 3 mo    Recent Lab Results:  N/A    Treatment Summary has been discussed and given to patient:   N/A    -------------------------------------------------------------------------------------------------------------------  Please call our office at [phone] if you have any  of the following symptoms:   Fever of 100.5 or greater  Chills  Shortness of breath  Swelling or pain in one leg    After office hours an answering service is available and will contact a provider for emergencies or if you are experiencing any of the above symptoms. Patient does express an interest in My Chart. My Chart log in information explained on the after visit summary printout at the [address] desk.     Vivian Pitts RN

## 2023-08-01 NOTE — PROGRESS NOTES
Arrived to the [address] [place] [address]. Lupron IM injection completed. Provided education on Lupron. Patient has had this medication before. Patient instructed to report any side effects to ordering provider. Patient tolerated well. Any issues or concerns during appointment: none. Patient aware of next infusion appointment on 11/1/23 (date) at 2 PM (time). Discharged ambulatory.

## 2023-08-03 ENCOUNTER — CLINICAL DOCUMENTATION (OUTPATIENT)
Dept: ONCOLOGY | Age: 78
End: 2023-08-03

## 2023-08-04 ENCOUNTER — TELEPHONE (OUTPATIENT)
Dept: ONCOLOGY | Age: 78
End: 2023-08-04

## 2023-08-04 NOTE — PROGRESS NOTES
Prior authorization request for Nubeqa 300mg tablets initiated by [facility] [employer] through [employer] under Key Code [id number]. Demographics and clinical questions were answered and submitted for medical review. The most recent oncology OV note, the initial medical oncology consult note from 2016 and the pathology reports from 2015 and 2016 were uploaded to the portal for medical review. Pending review by [employer] with an expected turnaround time of 72 hours.

## 2023-08-11 ENCOUNTER — TELEPHONE (OUTPATIENT)
Dept: ONCOLOGY | Age: 78
End: 2023-08-11

## 2023-08-11 NOTE — TELEPHONE ENCOUNTER
Pt called asking to receive a call to clarify a letter he received stating medication is not covered. Medication is: Nubequa that Pt is asking about.

## 2023-08-15 ENCOUNTER — TELEPHONE (OUTPATIENT)
Dept: ONCOLOGY | Age: 78
End: 2023-08-15

## 2023-08-15 NOTE — TELEPHONE ENCOUNTER
Will review with arya in regards to denial for nubeqa.   She is following up on this denial. Message to Dr. Jabier Freitas also to make sure she is aware

## 2023-08-15 NOTE — TELEPHONE ENCOUNTER
Physician provider: Linda Conway MD  Reason for today's call: Medication denial  Last office visit: n/a    Patient notified that their information will be routed to the [facility] clinical triage team for review. Patient is advised that they will receive a phone call from the triage department. If symptoms worsen before receiving a call back, the patient has been advised to proceed to the nearest ED. Pt called asking for call back regarding denial letter for: Joselyn Manley. Pt asks for clarity as to why it has been suddenly denied.

## 2023-09-06 ENCOUNTER — TELEPHONE (OUTPATIENT)
Dept: RADIATION ONCOLOGY | Age: 78
End: 2023-09-06

## 2023-09-06 NOTE — TELEPHONE ENCOUNTER
Pt states he is about out of Josias Netters, he got a letter stating they were not going to refill it, due to not on treatment any longer. Pt asking if Dr Ann Middleton still wants him on it and if so please call [facility].

## 2023-09-10 ENCOUNTER — CLINICAL DOCUMENTATION (OUTPATIENT)
Dept: ONCOLOGY | Age: 78
End: 2023-09-10

## 2023-09-13 ENCOUNTER — CLINICAL DOCUMENTATION (OUTPATIENT)
Dept: ONCOLOGY | Age: 78
End: 2023-09-13

## 2023-09-13 NOTE — PROGRESS NOTES
9/12/23 6:11 p.m. Call placed to [employer] at #[phone] to follow up on the appeal for Nubeqa 300 mg tablets. Spoke with representative, Gisela. He informed me that the denial has been overturned after the case went through the level 2 appeal process. Per Lucas Franco, we should be receiving an approval letter. 9/12/23 6:18 p.m. Voicemail left for the patient on his cell #[phone] informing him that the insurance company has re-reviewed the case and I have received a verbal notification of the denial being overturned and am awaiting the approval letter. I did not mention the name of the specific medication in the voicemail. I provided my direct phone number for him to call back should he have questions. 9/13/23 9:35 a.m. Still have not received an approval fax. Call placed to [employer] at #[phone]. Spoke with representative, Lorne Adorno. She was able to fax to the approval notification directly to me. A secure email was sent to the [address] [place] [address] them of the approval.  The approval letter was attached to the email. Request made for [facility] to process the Rx for shipment. Email confirmation received from the  that they have a paid claim and have left a message for the patient to call and coordinate shipment/delivery. Medication:  Nubeqa 300mg tablets  Approval Case ID: [id number]  Validity Dates:  8/12/23 - 9/12/24    Copy of the approval letter indexed under Media tab in Epic.

## 2023-09-28 ENCOUNTER — TELEPHONE (OUTPATIENT)
Dept: ORTHOPEDIC SURGERY | Age: 78
End: 2023-09-28

## 2023-09-28 DIAGNOSIS — M17.11 UNILATERAL PRIMARY OSTEOARTHRITIS, RIGHT KNEE: Primary | ICD-10-CM

## 2023-09-28 NOTE — TELEPHONE ENCOUNTER
[id number] [facility] Desk Staff 20 hours ago (1:28 PM)       Appointment Request From: Meli Wray     With Provider: Grupo Flores MD [[facility] [address] [facility]]     Preferred Date Range: Any     Preferred Times: Any Time     Reason for visit: Request an Appointment     Comments:  Gel injections

## 2023-10-10 ENCOUNTER — OFFICE VISIT (OUTPATIENT)
Dept: ORTHOPEDIC SURGERY | Age: 78
End: 2023-10-10

## 2023-10-10 DIAGNOSIS — M17.11 UNILATERAL PRIMARY OSTEOARTHRITIS, RIGHT KNEE: Primary | ICD-10-CM

## 2023-10-10 NOTE — PROGRESS NOTES
Name: Risa Adame  YOB: 1945  Gender: male  MRN: [id number]      CC: Knee Pain (R)       HPI: Risa Adame is a 70 y.o. male who presents with Knee Pain (R)  He finished his last round of Euflexxa on 1/26/2023 and states he is only been having pain for a couple weeks. He would like to start another round of Euflexxa injections today. He states no change in the knee since January. Physical Examination:  General: no acute distress  right Knee: No previous surgical scars present. No joint effusion present. Patella tracks centrally with no patellofemoral grind. Neutral mechanical alignment present. Passive ROM: 0 degrees of hyperextension with 130 degrees of flexion. Stable to varus and valgus stress at full extension and 30 degrees of flexion. Ligamentously intact. Calf is soft and nontender to palpation. Motor and sensory intact distally. Dorsalis pedis pulse 2+. Assessment:   1. Unilateral primary osteoarthritis, right knee         Plan:   We discussed starting another round of Euflexxa injection today which she would like to proceed with. We discussed more imaging and the likelihood of him progressing to a total joint that he states at this time he would like to proceed with more Euflexxa injections since they are working. He will continue work on a home exercise program and take anti-inflammatories on as-needed basis. Right knee injected from a anterior lateral approach with 2 mL Euflexxa viscosupplementation after sterile prep. Patient tolerated the procedure well was given postinjection flare precautions. Follow up next week for round 2.     Lydia Young PA-C  Orthopaedics and Sports Medicine

## 2023-10-11 RX ORDER — HYALURONATE SODIUM 10 MG/ML
20 SYRINGE (ML) INTRAARTICULAR ONCE
Status: SHIPPED | OUTPATIENT
Start: 2023-10-10

## 2023-10-13 NOTE — PROGRESS NOTES
Name: Mc Morel  YOB: 1945  Gender: male  MRN: [id number]      CC: Knee Pain (R)       HPI: Mc Morel is a 70 y.o. male who is here today for his second round of Euflexxa injections in his right knee. Physical Examination:  General: no acute distress  right Knee: Exam is unchanged, the knee continues to be painful with palpation and range of motion. There is no effusion or concern for infection. Assessment:   1. Arthritis of right knee    2. Unilateral primary osteoarthritis, right knee         Plan:     Right knee injected from a anterior lateral approach with 2 mL Euflexxa viscosupplementation after sterile prep. Patient tolerated the procedure well was given postinjection flare precautions. Follow up ine one week for last round of injections        Jaun Blood MD, [address] and Sports Medicine

## 2023-10-13 NOTE — TELEPHONE ENCOUNTER
Pt called asking for refill of Rx renewal for: Nubeqa 300 mg to be sent to pharm for future refill to [facility].  Pt stated he picked up last refill for old Rx 0

## 2023-10-17 ENCOUNTER — OFFICE VISIT (OUTPATIENT)
Dept: ORTHOPEDIC SURGERY | Age: 78
End: 2023-10-17

## 2023-10-17 DIAGNOSIS — M17.11 ARTHRITIS OF RIGHT KNEE: Primary | ICD-10-CM

## 2023-10-17 DIAGNOSIS — M17.11 UNILATERAL PRIMARY OSTEOARTHRITIS, RIGHT KNEE: ICD-10-CM

## 2023-10-17 RX ORDER — HYALURONATE SODIUM 10 MG/ML
20 SYRINGE (ML) INTRAARTICULAR ONCE
Status: COMPLETED | OUTPATIENT
Start: 2023-10-17 | End: 2023-10-17

## 2023-10-17 RX ADMIN — Medication 20 MG: at 09:11

## 2023-10-20 NOTE — PROGRESS NOTES
Name: Jovita Zhao  YOB: 1945  Gender: male  MRN: [id number]      CC: Knee Pain (R)       HPI: Jovita Zhao is a 70 y.o. male who is here today for his third round of Euflexxa injections in his right knee. Physical Examination:  General: no acute distress  right Knee: Exam is unchanged, the knee continues to be painful with palpation and range of motion. There is no effusion or concern for infection. Assessment:   1. Arthritis of right knee    2. Unilateral primary osteoarthritis, right knee         Plan:     Right knee injected from a anterior lateral approach with 2 mL Euflexxa viscosupplementation after sterile prep. Patient tolerated the procedure well was given postinjection flare precautions. Follow up as needed        Jaun Yeboah MD, [address] [facility] and Sports Medicine

## 2023-10-24 ENCOUNTER — OFFICE VISIT (OUTPATIENT)
Dept: ORTHOPEDIC SURGERY | Age: 78
End: 2023-10-24

## 2023-10-24 DIAGNOSIS — M17.11 UNILATERAL PRIMARY OSTEOARTHRITIS, RIGHT KNEE: ICD-10-CM

## 2023-10-24 DIAGNOSIS — M17.11 ARTHRITIS OF RIGHT KNEE: Primary | ICD-10-CM

## 2023-10-24 RX ORDER — HYALURONATE SODIUM 10 MG/ML
20 SYRINGE (ML) INTRAARTICULAR ONCE
Status: COMPLETED | OUTPATIENT
Start: 2023-10-24 | End: 2023-10-24

## 2023-10-24 RX ADMIN — Medication 20 MG: at 08:57

## 2023-10-25 RX ORDER — CITALOPRAM 20 MG/1
TABLET ORAL
Qty: 90 TABLET | Refills: 3 | OUTPATIENT
Start: 2023-10-25

## 2023-10-30 ENCOUNTER — PATIENT MESSAGE (OUTPATIENT)
Dept: ORTHOPEDIC SURGERY | Age: 78
End: 2023-10-30

## 2023-10-31 DIAGNOSIS — C61 PROSTATE CANCER (HCC): Primary | ICD-10-CM

## 2023-10-31 RX ORDER — CELECOXIB 200 MG/1
200 CAPSULE ORAL DAILY
Qty: 90 CAPSULE | Refills: 2 | Status: SHIPPED | OUTPATIENT
Start: 2023-10-31

## 2023-11-01 ENCOUNTER — OFFICE VISIT (OUTPATIENT)
Dept: ONCOLOGY | Age: 78
End: 2023-11-01
Payer: MEDICARE

## 2023-11-01 ENCOUNTER — HOSPITAL ENCOUNTER (OUTPATIENT)
Dept: LAB | Age: 78
Discharge: HOME | End: 2023-11-04
Payer: MEDICARE

## 2023-11-01 ENCOUNTER — HOSPITAL ENCOUNTER (OUTPATIENT)
Dept: INFUSION THERAPY | Age: 78
Discharge: HOME | End: 2023-11-01
Payer: MEDICARE

## 2023-11-01 VITALS
SYSTOLIC BLOOD PRESSURE: 151 MMHG | HEIGHT: 67 IN | HEART RATE: 58 BPM | BODY MASS INDEX: 23.9 KG/M2 | WEIGHT: 152.3 LBS | DIASTOLIC BLOOD PRESSURE: 92 MMHG

## 2023-11-01 DIAGNOSIS — C61 PROSTATE CANCER (HCC): Primary | ICD-10-CM

## 2023-11-01 DIAGNOSIS — C79.51 METASTASIS TO BONE (HCC): ICD-10-CM

## 2023-11-01 DIAGNOSIS — C61 PROSTATE CANCER (HCC): ICD-10-CM

## 2023-11-01 DIAGNOSIS — R23.2 HOT FLASH IN MALE: ICD-10-CM

## 2023-11-01 LAB
ALBUMIN SERPL-MCNC: 4 G/DL (ref 3.2–4.6)
ALBUMIN/GLOB SERPL: 1.3 (ref 0.4–1.6)
ALP SERPL-CCNC: 59 U/L (ref 50–136)
ALT SERPL-CCNC: 27 U/L (ref 12–65)
ANION GAP SERPL CALC-SCNC: 5 MMOL/L (ref 2–11)
AST SERPL-CCNC: 23 U/L (ref 15–37)
BASOPHILS # BLD: 0 K/UL (ref 0–0.2)
BASOPHILS NFR BLD: 0 % (ref 0–2)
BILIRUB SERPL-MCNC: 1.3 MG/DL (ref 0.2–1.1)
BUN SERPL-MCNC: 31 MG/DL (ref 8–23)
CALCIUM SERPL-MCNC: 9.5 MG/DL (ref 8.3–10.4)
CHLORIDE SERPL-SCNC: 104 MMOL/L (ref 101–110)
CO2 SERPL-SCNC: 29 MMOL/L (ref 21–32)
CREAT SERPL-MCNC: 1.5 MG/DL (ref 0.8–1.5)
DIFFERENTIAL METHOD BLD: NORMAL
EOSINOPHIL # BLD: 0.1 K/UL (ref 0–0.8)
EOSINOPHIL NFR BLD: 1 % (ref 0.5–7.8)
ERYTHROCYTE [DISTWIDTH] IN BLOOD BY AUTOMATED COUNT: 12.9 % (ref 11.9–14.6)
GLOBULIN SER CALC-MCNC: 3.1 G/DL (ref 2.8–4.5)
GLUCOSE SERPL-MCNC: 109 MG/DL (ref 65–100)
HCT VFR BLD AUTO: 41.5 % (ref 41.1–50.3)
HGB BLD-MCNC: 13.8 G/DL (ref 13.6–17.2)
IMM GRANULOCYTES # BLD AUTO: 0 K/UL (ref 0–0.5)
IMM GRANULOCYTES NFR BLD AUTO: 0 % (ref 0–5)
LYMPHOCYTES # BLD: 2.3 K/UL (ref 0.5–4.6)
LYMPHOCYTES NFR BLD: 24 % (ref 13–44)
MCH RBC QN AUTO: 31.4 PG (ref 26.1–32.9)
MCHC RBC AUTO-ENTMCNC: 33.3 G/DL (ref 31.4–35)
MCV RBC AUTO: 94.3 FL (ref 82–102)
MONOCYTES # BLD: 0.5 K/UL (ref 0.1–1.3)
MONOCYTES NFR BLD: 6 % (ref 4–12)
NEUTS SEG # BLD: 6.4 K/UL (ref 1.7–8.2)
NEUTS SEG NFR BLD: 68 % (ref 43–78)
NRBC # BLD: 0 K/UL (ref 0–0.2)
PLATELET # BLD AUTO: 270 K/UL (ref 150–450)
PMV BLD AUTO: 9.5 FL (ref 9.4–12.3)
POTASSIUM SERPL-SCNC: 3.3 MMOL/L (ref 3.5–5.1)
PROT SERPL-MCNC: 7.1 G/DL (ref 6.3–8.2)
PSA SERPL-MCNC: <0.1 NG/ML
RBC # BLD AUTO: 4.4 M/UL (ref 4.23–5.6)
SODIUM SERPL-SCNC: 138 MMOL/L (ref 133–143)
WBC # BLD AUTO: 9.4 K/UL (ref 4.3–11.1)

## 2023-11-01 PROCEDURE — 96402 CHEMO HORMON ANTINEOPL SQ/IM: CPT

## 2023-11-01 PROCEDURE — 84153 ASSAY OF PSA TOTAL: CPT

## 2023-11-01 PROCEDURE — 99214 OFFICE O/P EST MOD 30 MIN: CPT

## 2023-11-01 PROCEDURE — 6360000002 HC RX W HCPCS

## 2023-11-01 PROCEDURE — 36415 COLL VENOUS BLD VENIPUNCTURE: CPT

## 2023-11-01 PROCEDURE — 80053 COMPREHEN METABOLIC PANEL: CPT

## 2023-11-01 PROCEDURE — 1123F ACP DISCUSS/DSCN MKR DOCD: CPT

## 2023-11-01 PROCEDURE — 85025 COMPLETE CBC W/AUTO DIFF WBC: CPT

## 2023-11-01 RX ORDER — ALBUTEROL SULFATE 90 UG/1
4 AEROSOL, METERED RESPIRATORY (INHALATION) PRN
OUTPATIENT
Start: 2024-01-15

## 2023-11-01 RX ORDER — ACETAMINOPHEN 325 MG/1
650 TABLET ORAL
OUTPATIENT
Start: 2024-01-15

## 2023-11-01 RX ORDER — EPINEPHRINE 1 MG/ML
0.3 INJECTION, SOLUTION, CONCENTRATE INTRAVENOUS PRN
OUTPATIENT
Start: 2024-01-15

## 2023-11-01 RX ORDER — DIPHENHYDRAMINE HYDROCHLORIDE 50 MG/ML
50 INJECTION INTRAMUSCULAR; INTRAVENOUS
OUTPATIENT
Start: 2024-01-15

## 2023-11-01 RX ORDER — ALBUTEROL SULFATE 90 UG/1
4 AEROSOL, METERED RESPIRATORY (INHALATION) PRN
OUTPATIENT
Start: 2023-11-01

## 2023-11-01 RX ORDER — SODIUM CHLORIDE 9 MG/ML
INJECTION, SOLUTION INTRAVENOUS CONTINUOUS
OUTPATIENT
Start: 2023-11-01

## 2023-11-01 RX ORDER — ONDANSETRON 2 MG/ML
8 INJECTION INTRAMUSCULAR; INTRAVENOUS
OUTPATIENT
Start: 2024-01-15

## 2023-11-01 RX ORDER — ACETAMINOPHEN 325 MG/1
650 TABLET ORAL
OUTPATIENT
Start: 2023-11-01

## 2023-11-01 RX ORDER — SODIUM CHLORIDE 9 MG/ML
INJECTION, SOLUTION INTRAVENOUS CONTINUOUS
OUTPATIENT
Start: 2024-01-15

## 2023-11-01 RX ORDER — DIPHENHYDRAMINE HYDROCHLORIDE 50 MG/ML
50 INJECTION INTRAMUSCULAR; INTRAVENOUS
OUTPATIENT
Start: 2023-11-01

## 2023-11-01 RX ORDER — FAMOTIDINE 10 MG/ML
20 INJECTION, SOLUTION INTRAVENOUS
OUTPATIENT
Start: 2023-11-01

## 2023-11-01 RX ORDER — FAMOTIDINE 10 MG/ML
20 INJECTION, SOLUTION INTRAVENOUS
OUTPATIENT
Start: 2024-01-15

## 2023-11-01 RX ORDER — ONDANSETRON 2 MG/ML
8 INJECTION INTRAMUSCULAR; INTRAVENOUS
OUTPATIENT
Start: 2023-11-01

## 2023-11-01 RX ORDER — EPINEPHRINE 1 MG/ML
0.3 INJECTION, SOLUTION, CONCENTRATE INTRAVENOUS PRN
OUTPATIENT
Start: 2023-11-01

## 2023-11-01 RX ADMIN — LEUPROLIDE ACETATE 22.5 MG: KIT at 14:12

## 2023-11-01 ASSESSMENT — PATIENT HEALTH QUESTIONNAIRE - PHQ9: DEPRESSION UNABLE TO ASSESS: PT REFUSES

## 2023-11-01 NOTE — PROGRESS NOTES
Arrived to the [address] [place] [address]. [facility]. Lupron completed. Patient instructed to report any side effects to ordering provider. Patient tolerated well. Patient aware of next infusion appointment on 2/2/2024 (date) at 1400 (time). Discharged ambulatory.

## 2023-11-02 NOTE — PROGRESS NOTES
[employer] Hematology and Oncology: Office Visit Established Patient    Chief Complaint:    Chief Complaint   Patient presents with    Follow-up         History of Present Illness:  Mr. Vidal Bernal is a 70 y.o. male who returns today for management of prostate cancer. He is a patient  of Dr. LIN TEJADA AT [place], previously followed by Dr. Carlo Holley at [facility]. He originally had a diagnosis of prostate cancer in 2015 when a biopsy showed [place] 6 disease with a PSA of 4. He elected for watchful waiting at that time. In 2016, he transferred care to  Dr. LIN TEJADA AT [place] and PSA janelle to 7, then to 11.9, prompting repeat biopsy of the prostate that then showed [place] 4+5 disease. He was also having some right hip pain and pelvic discomfort which prompted  systemic restaging, bone scan revealed some small suspicious uptake in the sternum and in the left proximal femur, with corresponding findings on CT, along with an asymmetrically enlarged right pelvic sidewall internal iliac lymph node measuring 17 mm. All told, these findings were felt to most likely represent metastatic disease. Dr. LIN TEJADA AT [place] started him on Lupron with a 22.5 mg dose given on 10/3/16. He was referred to medical oncology for evaluation. We recommended short term observation with repeat  CT and bone scan after 3 months to determine whether these lesions responded to ADT, which would suggest stage IV disease. CT showed resolution of pelvic adenopathy but bone lesions are stable. PSA  is 0.6 consistent with excellent biochemical response. He is having pain in the left hip still. It is certainly possible that the bone lesions are not metastases, as one would have expected them to improve with ADT as the PSA and lymph node has done. We checked MRI of the left hip, which did not show bone metastases. Therefore, I believe the likelihood of these bone scan findings not representing metastases is quite high.   He is reluctant to consider any surgical options, so we recommended rad

## 2023-11-13 RX ORDER — CITALOPRAM 20 MG/1
TABLET ORAL
Qty: 90 TABLET | Refills: 3 | OUTPATIENT
Start: 2023-11-13

## 2024-02-01 DIAGNOSIS — C79.51 METASTASIS TO BONE (HCC): ICD-10-CM

## 2024-02-01 DIAGNOSIS — C61 PROSTATE CANCER (HCC): Primary | ICD-10-CM

## 2024-02-02 ENCOUNTER — HOSPITAL ENCOUNTER (OUTPATIENT)
Dept: LAB | Age: 79
End: 2024-02-02
Payer: MEDICARE

## 2024-02-02 ENCOUNTER — OFFICE VISIT (OUTPATIENT)
Dept: ONCOLOGY | Age: 79
End: 2024-02-02
Payer: MEDICARE

## 2024-02-02 ENCOUNTER — HOSPITAL ENCOUNTER (OUTPATIENT)
Dept: INFUSION THERAPY | Age: 79
Discharge: HOME | End: 2024-02-02
Payer: MEDICARE

## 2024-02-02 VITALS
OXYGEN SATURATION: 98 % | RESPIRATION RATE: 18 BRPM | TEMPERATURE: 97.9 F | HEART RATE: 64 BPM | WEIGHT: 152.6 LBS | DIASTOLIC BLOOD PRESSURE: 93 MMHG | HEIGHT: 67 IN | BODY MASS INDEX: 23.95 KG/M2 | SYSTOLIC BLOOD PRESSURE: 149 MMHG

## 2024-02-02 DIAGNOSIS — C79.51 METASTASIS TO BONE (HCC): ICD-10-CM

## 2024-02-02 DIAGNOSIS — C61 PROSTATE CANCER (HCC): Primary | ICD-10-CM

## 2024-02-02 DIAGNOSIS — C61 PROSTATE CANCER (HCC): ICD-10-CM

## 2024-02-02 LAB
ALBUMIN SERPL-MCNC: 3.9 G/DL (ref 3.2–4.6)
ALBUMIN/GLOB SERPL: 1.1 (ref 0.4–1.6)
ALP SERPL-CCNC: 65 U/L (ref 50–136)
ALT SERPL-CCNC: 27 U/L (ref 12–65)
ANION GAP SERPL CALC-SCNC: 6 MMOL/L (ref 2–11)
AST SERPL-CCNC: 19 U/L (ref 15–37)
BASOPHILS # BLD: 0 K/UL (ref 0–0.2)
BASOPHILS NFR BLD: 1 % (ref 0–2)
BILIRUB SERPL-MCNC: 1.6 MG/DL (ref 0.2–1.1)
BUN SERPL-MCNC: 38 MG/DL (ref 8–23)
CALCIUM SERPL-MCNC: 9.8 MG/DL (ref 8.3–10.4)
CHLORIDE SERPL-SCNC: 106 MMOL/L (ref 103–113)
CO2 SERPL-SCNC: 27 MMOL/L (ref 21–32)
CREAT SERPL-MCNC: 1.4 MG/DL (ref 0.8–1.5)
DIFFERENTIAL METHOD BLD: ABNORMAL
EOSINOPHIL # BLD: 0.1 K/UL (ref 0–0.8)
EOSINOPHIL NFR BLD: 1 % (ref 0.5–7.8)
ERYTHROCYTE [DISTWIDTH] IN BLOOD BY AUTOMATED COUNT: 12.9 % (ref 11.9–14.6)
GLOBULIN SER CALC-MCNC: 3.5 G/DL (ref 2.8–4.5)
GLUCOSE SERPL-MCNC: 108 MG/DL (ref 65–100)
HCT VFR BLD AUTO: 41.5 % (ref 41.1–50.3)
HGB BLD-MCNC: 13.8 G/DL (ref 13.6–17.2)
IMM GRANULOCYTES # BLD AUTO: 0 K/UL (ref 0–0.5)
IMM GRANULOCYTES NFR BLD AUTO: 1 % (ref 0–5)
LYMPHOCYTES # BLD: 2 K/UL (ref 0.5–4.6)
LYMPHOCYTES NFR BLD: 24 % (ref 13–44)
MAGNESIUM SERPL-MCNC: 2 MG/DL (ref 1.8–2.4)
MCH RBC QN AUTO: 31.2 PG (ref 26.1–32.9)
MCHC RBC AUTO-ENTMCNC: 33.3 G/DL (ref 31.4–35)
MCV RBC AUTO: 93.7 FL (ref 82–102)
MONOCYTES # BLD: 0.5 K/UL (ref 0.1–1.3)
MONOCYTES NFR BLD: 5 % (ref 4–12)
NEUTS SEG # BLD: 6 K/UL (ref 1.7–8.2)
NEUTS SEG NFR BLD: 68 % (ref 43–78)
NRBC # BLD: 0 K/UL (ref 0–0.2)
PHOSPHATE SERPL-MCNC: 2.6 MG/DL (ref 2.3–3.7)
PLATELET # BLD AUTO: 301 K/UL (ref 150–450)
PMV BLD AUTO: 9.2 FL (ref 9.4–12.3)
POTASSIUM SERPL-SCNC: 3.1 MMOL/L (ref 3.5–5.1)
PROT SERPL-MCNC: 7.4 G/DL (ref 6.3–8.2)
PSA SERPL-MCNC: <0.1 NG/ML
RBC # BLD AUTO: 4.43 M/UL (ref 4.23–5.6)
SODIUM SERPL-SCNC: 139 MMOL/L (ref 136–146)
WBC # BLD AUTO: 8.7 K/UL (ref 4.3–11.1)

## 2024-02-02 PROCEDURE — 36415 COLL VENOUS BLD VENIPUNCTURE: CPT

## 2024-02-02 PROCEDURE — 80053 COMPREHEN METABOLIC PANEL: CPT

## 2024-02-02 PROCEDURE — G8420 CALC BMI NORM PARAMETERS: HCPCS | Performed by: INTERNAL MEDICINE

## 2024-02-02 PROCEDURE — 96402 CHEMO HORMON ANTINEOPL SQ/IM: CPT

## 2024-02-02 PROCEDURE — 84100 ASSAY OF PHOSPHORUS: CPT

## 2024-02-02 PROCEDURE — 83735 ASSAY OF MAGNESIUM: CPT

## 2024-02-02 PROCEDURE — 85025 COMPLETE CBC W/AUTO DIFF WBC: CPT

## 2024-02-02 PROCEDURE — 1036F TOBACCO NON-USER: CPT | Performed by: INTERNAL MEDICINE

## 2024-02-02 PROCEDURE — G8484 FLU IMMUNIZE NO ADMIN: HCPCS | Performed by: INTERNAL MEDICINE

## 2024-02-02 PROCEDURE — 99214 OFFICE O/P EST MOD 30 MIN: CPT | Performed by: INTERNAL MEDICINE

## 2024-02-02 PROCEDURE — 6360000002 HC RX W HCPCS

## 2024-02-02 PROCEDURE — G8428 CUR MEDS NOT DOCUMENT: HCPCS | Performed by: INTERNAL MEDICINE

## 2024-02-02 PROCEDURE — 84153 ASSAY OF PSA TOTAL: CPT

## 2024-02-02 PROCEDURE — 1123F ACP DISCUSS/DSCN MKR DOCD: CPT | Performed by: INTERNAL MEDICINE

## 2024-02-02 RX ORDER — SODIUM CHLORIDE 9 MG/ML
INJECTION, SOLUTION INTRAVENOUS CONTINUOUS
OUTPATIENT
Start: 2024-04-19

## 2024-02-02 RX ORDER — ACETAMINOPHEN 325 MG/1
650 TABLET ORAL
OUTPATIENT
Start: 2024-04-19

## 2024-02-02 RX ORDER — ALBUTEROL SULFATE 90 UG/1
4 AEROSOL, METERED RESPIRATORY (INHALATION) PRN
OUTPATIENT
Start: 2024-04-19

## 2024-02-02 RX ORDER — EPINEPHRINE 1 MG/ML
0.3 INJECTION, SOLUTION, CONCENTRATE INTRAVENOUS PRN
OUTPATIENT
Start: 2024-04-19

## 2024-02-02 RX ORDER — ONDANSETRON 2 MG/ML
8 INJECTION INTRAMUSCULAR; INTRAVENOUS
OUTPATIENT
Start: 2024-04-19

## 2024-02-02 RX ORDER — DIPHENHYDRAMINE HYDROCHLORIDE 50 MG/ML
50 INJECTION INTRAMUSCULAR; INTRAVENOUS
OUTPATIENT
Start: 2024-04-19

## 2024-02-02 RX ADMIN — LEUPROLIDE ACETATE 22.5 MG: KIT at 13:53

## 2024-02-02 NOTE — PROGRESS NOTES
daily 90 capsule 2    celecoxib (CELEBREX) 200 MG capsule Take 1 capsule by mouth 2 times daily 180 capsule 0    leuprolide acetate (LUPRON) 11.25 MG KIT injection Inject 11.25 mg into the muscle      Multiple Vitamin (MULTIVITAMIN PO) Take by mouth      chlorthalidone (HYGROTON) 25 MG tablet Take 1 tablet by mouth daily      LORazepam (ATIVAN) 1 MG tablet Take 0.5 tablets by mouth 2 times daily as needed.      zolpidem (AMBIEN) 10 MG tablet Take 1 tablet by mouth nightly.      Darolutamide 300 MG TABS Take 600 mg by mouth in the morning and at bedtime 360 tablet 3    citalopram (CELEXA) 20 MG tablet Take 1 tablet by mouth daily 90 tablet 1    fluticasone (FLONASE) 50 MCG/ACT nasal spray 2 sprays by Nasal route daily      potassium chloride (KLOR-CON M) 10 MEQ extended release tablet Take 1 tablet by mouth 2 times daily      famotidine (PEPCID) 40 MG tablet Take 1 tablet by mouth daily       Current Facility-Administered Medications   Medication Dose Route Frequency Provider Last Rate Last Admin    sodium hyaluronate (EUFLEXXA, HYALGAN) injection 20 mg  20 mg Intra-artICUlar Once Franci Alvares PA           OBJECTIVE:  BP (!) 149/93 Comment: patient was antsy and couldnt stop moving  Pulse 64   Temp 97.9 °F (36.6 °C) (Oral)   Resp 18   Ht 1.702 m (5' 7\")   Wt 69.2 kg (152 lb 9.6 oz)   SpO2 98%   BMI 23.90 kg/m²   Pain Score:   0 - No pain (fatigue 2)     ECO    Physical Exam:  Constitutional: Well developed, well nourished male in no acute distress, sitting comfortably in the exam room chair.    HEENT: Normocephalic and atraumatic. Sclerae anicteric. Neck supple without JVD. No thyromegaly present.    Lymph node   Deferred   Skin Warm and dry.  No bruising and no rash noted.  No erythema.  No pallor.    Respiratory Lungs are clear to auscultation bilaterally without wheezes, rales or rhonchi, normal air exchange without accessory muscle use.    CVS Normal rate, regular rhythm and normal S1 and S2.  No

## 2024-02-02 NOTE — PROGRESS NOTES
[facility] to the [facility] ambulatory.  Lupron injection completed.   Patient has received this medication before and has no questions.    Patient instructed to report any side affects to ordering provider.  Patient tolerated well.   Any issues or concerns during appointment: none.  Patient aware of next appointment on 5/2/2024 (date) at 1130 (time).  Discharged home ambulatory.

## 2024-02-02 NOTE — PATIENT INSTRUCTIONS
Patient Instructions from Today's Visit    Reason for Visit:  Follow up Prostate Cancer     Diagnosis Information:  https://www.cancer.net/about-us/asco-answers-patient-education-materials/hmtb-khrdpcm-npnx-sheets    Plan:  Labs and symptoms reviewed. If you feel strong about holding the medication, we might be able to stop for a few months. You have elected to continue the current treatment.    Continue Nubeqa and Lupron injection every 3 months.    Follow Up:  Follow up 3 months.    Recent Lab Results:  Hospital Outpatient Visit on 02/02/2024   Component Date Value Ref Range Status    Magnesium 02/02/2024 2.0  1.8 - 2.4 mg/dL Final    Phosphorus 02/02/2024 2.6  2.3 - 3.7 MG/DL Final    PSA 02/02/2024 <0.1  <4.0 ng/mL Final    Comment: Siemens 2Catalyze methodology.  New method in use, please reestablish patient baseline  Siemens [place] LOCI technology. Patient's results of tumor marker testing may not be comparable to labs using different manufacturers/methods.      Sodium 02/02/2024 139  136 - 146 mmol/L Final    Potassium 02/02/2024 3.1 (L)  3.5 - 5.1 mmol/L Final    Chloride 02/02/2024 106  103 - 113 mmol/L Final    CO2 02/02/2024 27  21 - 32 mmol/L Final    Anion Gap 02/02/2024 6  2 - 11 mmol/L Final    Glucose 02/02/2024 108 (H)  65 - 100 mg/dL Final    BUN 02/02/2024 38 (H)  8 - 23 MG/DL Final    Creatinine 02/02/2024 1.40  0.8 - 1.5 MG/DL Final    Est, Glom Filt Rate 02/02/2024 52 (L)  >60 ml/min/1.73m2 Final    Comment:    Pediatric calculator link: https://www.kidney.org/professionals/kdoqi/gfr_calculatorped     These results are not intended for use in patients <18 years of age.     eGFR results are calculated without a race factor using  the 2021 CKD-EPI equation. Careful clinical correlation is recommended, particularly when comparing to results calculated using previous equations.  The CKD-EPI equation is less accurate in patients with extremes of muscle mass, extra-renal metabolism of creatinine,

## 2024-02-06 ENCOUNTER — CLINICAL DOCUMENTATION (OUTPATIENT)
Facility: HOSPITAL | Age: 79
End: 2024-02-06

## 2024-02-06 NOTE — PROGRESS NOTES
Patient Assistance    Received message in Pool. Pt changed insurance. Unsure if pt is receiving free drug. Will contact pt.               Additional notes:

## 2024-02-06 NOTE — PROGRESS NOTES
Patient Assistance    Spoke w/pt. Pt indicated he was enrolled in Medicare Pt D however, nothing changed w/supplemental insurance.  Sent [employer] Attestation for 2024 re-enrollment. Pt may need to reapply since due date has past.  Will f/u with [employer]               Additional notes: Free Drug

## 2024-02-07 ENCOUNTER — CLINICAL DOCUMENTATION (OUTPATIENT)
Facility: HOSPITAL | Age: 79
End: 2024-02-07

## 2024-02-08 ENCOUNTER — CLINICAL DOCUMENTATION (OUTPATIENT)
Facility: HOSPITAL | Age: 79
End: 2024-02-08

## 2024-02-08 NOTE — PROGRESS NOTES
Patient Assistance    Received Provider Signature. Faxed to [employer] for free drug               Additional notes: free drug pending

## 2024-02-16 ENCOUNTER — CLINICAL DOCUMENTATION (OUTPATIENT)
Facility: HOSPITAL | Age: 79
End: 2024-02-16

## 2024-02-16 NOTE — PROGRESS NOTES
Patient Assistance    Called [employer] 2/12/24. Was told fax has not been uploaded to system. Call back to confirm later in the week  2/16/24 Spoke with Alissa chamberlain/[employer]. Fax was not legible. Requested re-send. Faxed 2nd time               Additional notes: Free Drug Pending

## 2024-02-19 ENCOUNTER — CLINICAL DOCUMENTATION (OUTPATIENT)
Facility: HOSPITAL | Age: 79
End: 2024-02-19

## 2024-02-19 NOTE — PROGRESS NOTES
Patient Assistance  Called [employer] & spoke to Teresa. Pt is approved free drug through 12/31/24        Called pt to inform him of approval       Additional notes: Free Drug

## 2024-02-29 ENCOUNTER — CLINICAL DOCUMENTATION (OUTPATIENT)
Facility: HOSPITAL | Age: 79
End: 2024-02-29

## 2024-02-29 NOTE — PROGRESS NOTES
Patient Assistance  FN called [employer] & spoke to [facility]. FN was told they attempted to reach patient but was unable to do so. FN called pt , provided  contact information and let him know he can call to set up shipment. Pt will call today               Additional notes: Free Drug Nubeqa

## 2024-03-04 ENCOUNTER — CLINICAL DOCUMENTATION (OUTPATIENT)
Facility: HOSPITAL | Age: 79
End: 2024-03-04

## 2024-03-04 NOTE — PROGRESS NOTES
Patient Assistance    Called [employer] and spoke to Allyssa. Order is scheduled to ship today. No tracking number available. Expected receipt date 3/6/24               Additional notes: Free Drug Nubeqa

## 2024-03-08 ENCOUNTER — CLINICAL DOCUMENTATION (OUTPATIENT)
Facility: HOSPITAL | Age: 79
End: 2024-03-08

## 2024-03-08 NOTE — PROGRESS NOTES
Patient Assistance    Pt received drug shipment                 Additional notes:

## 2024-05-02 ENCOUNTER — HOSPITAL ENCOUNTER (OUTPATIENT)
Dept: INFUSION THERAPY | Age: 79
Setting detail: INFUSION SERIES
Discharge: HOME | End: 2024-05-02
Payer: MEDICARE

## 2024-05-02 ENCOUNTER — HOSPITAL ENCOUNTER (OUTPATIENT)
Dept: LAB | Age: 79
End: 2024-05-02
Payer: MEDICARE

## 2024-05-02 ENCOUNTER — OFFICE VISIT (OUTPATIENT)
Dept: ONCOLOGY | Age: 79
End: 2024-05-02
Payer: MEDICARE

## 2024-05-02 VITALS — BODY MASS INDEX: 23.7 KG/M2 | WEIGHT: 151 LBS | HEIGHT: 67 IN

## 2024-05-02 DIAGNOSIS — C61 PROSTATE CANCER (HCC): Primary | ICD-10-CM

## 2024-05-02 DIAGNOSIS — R23.2 HOT FLASH IN MALE: ICD-10-CM

## 2024-05-02 DIAGNOSIS — R53.83 FATIGUE DUE TO TREATMENT: ICD-10-CM

## 2024-05-02 DIAGNOSIS — C79.51 METASTASIS TO BONE (HCC): ICD-10-CM

## 2024-05-02 DIAGNOSIS — C61 PROSTATE CANCER (HCC): ICD-10-CM

## 2024-05-02 LAB
ALBUMIN SERPL-MCNC: 3.8 G/DL (ref 3.2–4.6)
ALBUMIN/GLOB SERPL: 1.3 (ref 1–1.9)
ALP SERPL-CCNC: 62 U/L (ref 40–129)
ALT SERPL-CCNC: 18 U/L (ref 12–65)
ANION GAP SERPL CALC-SCNC: 11 MMOL/L (ref 9–18)
AST SERPL-CCNC: 26 U/L (ref 15–37)
BASOPHILS # BLD: 0 K/UL (ref 0–0.2)
BASOPHILS NFR BLD: 1 % (ref 0–2)
BILIRUB SERPL-MCNC: 1.5 MG/DL (ref 0–1.2)
BUN SERPL-MCNC: 31 MG/DL (ref 8–23)
CALCIUM SERPL-MCNC: 9.8 MG/DL (ref 8.8–10.2)
CHLORIDE SERPL-SCNC: 103 MMOL/L (ref 98–107)
CO2 SERPL-SCNC: 26 MMOL/L (ref 20–28)
CREAT SERPL-MCNC: 1.3 MG/DL (ref 0.8–1.3)
DIFFERENTIAL METHOD BLD: ABNORMAL
EOSINOPHIL # BLD: 0.2 K/UL (ref 0–0.8)
EOSINOPHIL NFR BLD: 2 % (ref 0.5–7.8)
ERYTHROCYTE [DISTWIDTH] IN BLOOD BY AUTOMATED COUNT: 12.7 % (ref 11.9–14.6)
GLOBULIN SER CALC-MCNC: 2.9 G/DL (ref 2.3–3.5)
GLUCOSE SERPL-MCNC: 114 MG/DL (ref 70–99)
HCT VFR BLD AUTO: 41.3 % (ref 41.1–50.3)
HGB BLD-MCNC: 13.7 G/DL (ref 13.6–17.2)
IMM GRANULOCYTES # BLD AUTO: 0 K/UL (ref 0–0.5)
IMM GRANULOCYTES NFR BLD AUTO: 0 % (ref 0–5)
LYMPHOCYTES # BLD: 2.6 K/UL (ref 0.5–4.6)
LYMPHOCYTES NFR BLD: 30 % (ref 13–44)
MAGNESIUM SERPL-MCNC: 2 MG/DL (ref 1.8–2.4)
MCH RBC QN AUTO: 30.9 PG (ref 26.1–32.9)
MCHC RBC AUTO-ENTMCNC: 33.2 G/DL (ref 31.4–35)
MCV RBC AUTO: 93.2 FL (ref 82–102)
MONOCYTES # BLD: 0.6 K/UL (ref 0.1–1.3)
MONOCYTES NFR BLD: 7 % (ref 4–12)
NEUTS SEG # BLD: 5.2 K/UL (ref 1.7–8.2)
NEUTS SEG NFR BLD: 61 % (ref 43–78)
NRBC # BLD: 0 K/UL (ref 0–0.2)
PHOSPHATE SERPL-MCNC: 2.7 MG/DL (ref 2.5–4.5)
PLATELET # BLD AUTO: 289 K/UL (ref 150–450)
PMV BLD AUTO: 9.3 FL (ref 9.4–12.3)
POTASSIUM SERPL-SCNC: 3.9 MMOL/L (ref 3.5–5.1)
PROT SERPL-MCNC: 6.8 G/DL (ref 6.3–8.2)
PSA SERPL-MCNC: <0.1 NG/ML (ref 0–4)
RBC # BLD AUTO: 4.43 M/UL (ref 4.23–5.6)
SODIUM SERPL-SCNC: 140 MMOL/L (ref 136–145)
WBC # BLD AUTO: 8.6 K/UL (ref 4.3–11.1)

## 2024-05-02 PROCEDURE — 1036F TOBACCO NON-USER: CPT | Performed by: NURSE PRACTITIONER

## 2024-05-02 PROCEDURE — 83735 ASSAY OF MAGNESIUM: CPT

## 2024-05-02 PROCEDURE — 6360000002 HC RX W HCPCS

## 2024-05-02 PROCEDURE — 80053 COMPREHEN METABOLIC PANEL: CPT

## 2024-05-02 PROCEDURE — G8420 CALC BMI NORM PARAMETERS: HCPCS | Performed by: NURSE PRACTITIONER

## 2024-05-02 PROCEDURE — 96402 CHEMO HORMON ANTINEOPL SQ/IM: CPT

## 2024-05-02 PROCEDURE — 99214 OFFICE O/P EST MOD 30 MIN: CPT | Performed by: NURSE PRACTITIONER

## 2024-05-02 PROCEDURE — 84100 ASSAY OF PHOSPHORUS: CPT

## 2024-05-02 PROCEDURE — 36415 COLL VENOUS BLD VENIPUNCTURE: CPT

## 2024-05-02 PROCEDURE — G8427 DOCREV CUR MEDS BY ELIG CLIN: HCPCS | Performed by: NURSE PRACTITIONER

## 2024-05-02 PROCEDURE — 85025 COMPLETE CBC W/AUTO DIFF WBC: CPT

## 2024-05-02 PROCEDURE — 1123F ACP DISCUSS/DSCN MKR DOCD: CPT | Performed by: NURSE PRACTITIONER

## 2024-05-02 PROCEDURE — 84153 ASSAY OF PSA TOTAL: CPT

## 2024-05-02 RX ORDER — ONDANSETRON 2 MG/ML
8 INJECTION INTRAMUSCULAR; INTRAVENOUS
OUTPATIENT
Start: 2024-07-18

## 2024-05-02 RX ORDER — SODIUM CHLORIDE 9 MG/ML
INJECTION, SOLUTION INTRAVENOUS CONTINUOUS
OUTPATIENT
Start: 2024-07-18

## 2024-05-02 RX ORDER — ACETAMINOPHEN 325 MG/1
650 TABLET ORAL
OUTPATIENT
Start: 2024-07-18

## 2024-05-02 RX ORDER — EPINEPHRINE 1 MG/ML
0.3 INJECTION, SOLUTION, CONCENTRATE INTRAVENOUS PRN
OUTPATIENT
Start: 2024-07-18

## 2024-05-02 RX ORDER — CITALOPRAM 20 MG/1
20 TABLET ORAL DAILY
Qty: 90 TABLET | Refills: 1 | Status: SHIPPED | OUTPATIENT
Start: 2024-05-02 | End: 2024-10-29

## 2024-05-02 RX ORDER — DIPHENHYDRAMINE HYDROCHLORIDE 50 MG/ML
50 INJECTION INTRAMUSCULAR; INTRAVENOUS
OUTPATIENT
Start: 2024-07-18

## 2024-05-02 RX ORDER — ALBUTEROL SULFATE 90 UG/1
4 AEROSOL, METERED RESPIRATORY (INHALATION) PRN
OUTPATIENT
Start: 2024-07-18

## 2024-05-02 RX ADMIN — LEUPROLIDE ACETATE 22.5 MG: KIT at 12:59

## 2024-05-02 ASSESSMENT — PATIENT HEALTH QUESTIONNAIRE - PHQ9
SUM OF ALL RESPONSES TO PHQ QUESTIONS 1-9: 0
SUM OF ALL RESPONSES TO PHQ9 QUESTIONS 1 & 2: 0
SUM OF ALL RESPONSES TO PHQ QUESTIONS 1-9: 0
1. LITTLE INTEREST OR PLEASURE IN DOING THINGS: NOT AT ALL
2. FEELING DOWN, DEPRESSED OR HOPELESS: NOT AT ALL

## 2024-05-02 NOTE — PROGRESS NOTES
TABS Take 600 mg by mouth in the morning and at bedtime 360 tablet 3    celecoxib (CELEBREX) 200 MG capsule Take 1 capsule by mouth daily 90 capsule 2    leuprolide acetate (LUPRON) 11.25 MG KIT injection Inject 11.25 mg into the muscle      Multiple Vitamin (MULTIVITAMIN PO) Take by mouth      fluticasone (FLONASE) 50 MCG/ACT nasal spray 2 sprays by Nasal route daily      potassium chloride (KLOR-CON M) 10 MEQ extended release tablet Take 1 tablet by mouth 2 times daily      famotidine (PEPCID) 40 MG tablet Take 1 tablet by mouth daily      chlorthalidone (HYGROTON) 25 MG tablet Take 1 tablet by mouth daily      LORazepam (ATIVAN) 1 MG tablet Take 0.5 tablets by mouth 2 times daily as needed.      zolpidem (AMBIEN) 10 MG tablet Take 1 tablet by mouth nightly.       Current Facility-Administered Medications   Medication Dose Route Frequency Provider Last Rate Last Admin    sodium hyaluronate (EUFLEXXA, HYALGAN) injection 20 mg  20 mg Intra-artICUlar Once Franci Alvares PA           OBJECTIVE:  Ht 1.702 m (5' 7\")   Wt 68.5 kg (151 lb)   BMI 23.65 kg/m²   Pain Score:   0 - No pain (fatigue- 0)     ECO    Physical Exam:  Constitutional: Well developed, well nourished male in no acute distress, sitting comfortably in the exam room chair.    HEENT: Normocephalic and atraumatic. Sclerae anicteric. Neck supple without JVD. No thyromegaly present.    Lymph node   Deferred   Skin Warm and dry.  No bruising and no rash noted.  No erythema.  No pallor.    Respiratory Lungs are clear to auscultation bilaterally without wheezes, rales or rhonchi, normal air exchange without accessory muscle use.    CVS Normal rate, regular rhythm and normal S1 and S2.  No murmurs, gallops, or rubs.   Neuro Grossly nonfocal with no obvious sensory or motor deficits.   MSK Normal range of motion in general.  No edema and no tenderness.   Psych Appropriate mood and affect.          Labs:  Recent Results (from the past 96 hour(s))   PSA,

## 2024-05-02 NOTE — PROGRESS NOTES
Arrived to the [facility]fusion Center.  Lupron completed. Patient tolerated well.   Any issues or concerns during appointment: none.  Patient instructed to call provider with temperature of 100.4 or greater or nausea/vomiting/ diarrhea or pain not controlled by medications.  Discharged ambulatory.

## 2024-06-18 ENCOUNTER — PATIENT MESSAGE (OUTPATIENT)
Dept: ORTHOPEDIC SURGERY | Age: 79
End: 2024-06-18

## 2024-06-18 NOTE — TELEPHONE ENCOUNTER
From: Sergei Coronel  To: Dr. Jaun Teixeira  Sent: 6/17/2024 11:40 AM EDT  Subject: Appointment Request    Appointment Request From: Sergei Coronel    With Provider: Jaun Teixeira MD [[facility]]    Preferred Date Range: Any    Preferred Times: Any Time    Reason for visit: Request an Appointment    Comments:  Right knee…. Would like consultation on ablation & or more gel shots.. tnx

## 2024-07-10 ENCOUNTER — CLINICAL DOCUMENTATION (OUTPATIENT)
Facility: HOSPITAL | Age: 79
End: 2024-07-10

## 2024-07-10 RX ORDER — CELECOXIB 200 MG/1
200 CAPSULE ORAL DAILY
Qty: 90 CAPSULE | Refills: 3 | OUTPATIENT
Start: 2024-07-10

## 2024-08-02 ENCOUNTER — HOSPITAL ENCOUNTER (OUTPATIENT)
Dept: LAB | Age: 79
End: 2024-08-02
Payer: MEDICARE

## 2024-08-02 ENCOUNTER — OFFICE VISIT (OUTPATIENT)
Dept: ONCOLOGY | Age: 79
End: 2024-08-02
Payer: MEDICARE

## 2024-08-02 ENCOUNTER — HOSPITAL ENCOUNTER (OUTPATIENT)
Dept: INFUSION THERAPY | Age: 79
Setting detail: INFUSION SERIES
Discharge: HOME | End: 2024-08-02
Payer: MEDICARE

## 2024-08-02 VITALS
BODY MASS INDEX: 23.7 KG/M2 | OXYGEN SATURATION: 100 % | SYSTOLIC BLOOD PRESSURE: 174 MMHG | WEIGHT: 151 LBS | RESPIRATION RATE: 12 BRPM | DIASTOLIC BLOOD PRESSURE: 99 MMHG | HEIGHT: 67 IN | HEART RATE: 56 BPM | TEMPERATURE: 98.4 F

## 2024-08-02 DIAGNOSIS — C61 PROSTATE CANCER (HCC): Primary | ICD-10-CM

## 2024-08-02 DIAGNOSIS — C61 PROSTATE CANCER (HCC): ICD-10-CM

## 2024-08-02 DIAGNOSIS — C79.51 METASTASIS TO BONE (HCC): ICD-10-CM

## 2024-08-02 DIAGNOSIS — R23.2 HOT FLASH IN MALE: ICD-10-CM

## 2024-08-02 LAB
ALBUMIN SERPL-MCNC: 4.2 G/DL (ref 3.2–4.6)
ALBUMIN/GLOB SERPL: 1.6 (ref 1–1.9)
ALP SERPL-CCNC: 66 U/L (ref 40–129)
ALT SERPL-CCNC: 19 U/L (ref 12–65)
ANION GAP SERPL CALC-SCNC: 11 MMOL/L (ref 9–18)
AST SERPL-CCNC: 24 U/L (ref 15–37)
BASOPHILS # BLD: 0 K/UL (ref 0–0.2)
BASOPHILS NFR BLD: 0 % (ref 0–2)
BILIRUB SERPL-MCNC: 1.7 MG/DL (ref 0–1.2)
BUN SERPL-MCNC: 27 MG/DL (ref 8–23)
CALCIUM SERPL-MCNC: 10 MG/DL (ref 8.8–10.2)
CHLORIDE SERPL-SCNC: 102 MMOL/L (ref 98–107)
CO2 SERPL-SCNC: 30 MMOL/L (ref 20–28)
CREAT SERPL-MCNC: 1.21 MG/DL (ref 0.8–1.3)
DIFFERENTIAL METHOD BLD: ABNORMAL
EOSINOPHIL # BLD: 0.2 K/UL (ref 0–0.8)
EOSINOPHIL NFR BLD: 2 % (ref 0.5–7.8)
ERYTHROCYTE [DISTWIDTH] IN BLOOD BY AUTOMATED COUNT: 13.6 % (ref 11.9–14.6)
GLOBULIN SER CALC-MCNC: 2.7 G/DL (ref 2.3–3.5)
GLUCOSE SERPL-MCNC: 116 MG/DL (ref 70–99)
HCT VFR BLD AUTO: 44 % (ref 41.1–50.3)
HGB BLD-MCNC: 14.4 G/DL (ref 13.6–17.2)
IMM GRANULOCYTES # BLD AUTO: 0 K/UL (ref 0–0.5)
IMM GRANULOCYTES NFR BLD AUTO: 0 % (ref 0–5)
LYMPHOCYTES # BLD: 3.3 K/UL (ref 0.5–4.6)
LYMPHOCYTES NFR BLD: 35 % (ref 13–44)
MAGNESIUM SERPL-MCNC: 2.1 MG/DL (ref 1.8–2.4)
MCH RBC QN AUTO: 31.8 PG (ref 26.1–32.9)
MCHC RBC AUTO-ENTMCNC: 32.7 G/DL (ref 31.4–35)
MCV RBC AUTO: 97.1 FL (ref 82–102)
MONOCYTES # BLD: 0.6 K/UL (ref 0.1–1.3)
MONOCYTES NFR BLD: 6 % (ref 4–12)
NEUTS SEG # BLD: 5.3 K/UL (ref 1.7–8.2)
NEUTS SEG NFR BLD: 57 % (ref 43–78)
NRBC # BLD: 0 K/UL (ref 0–0.2)
PHOSPHATE SERPL-MCNC: 3.4 MG/DL (ref 2.5–4.5)
PLATELET # BLD AUTO: 299 K/UL (ref 150–450)
PMV BLD AUTO: 9.3 FL (ref 9.4–12.3)
POTASSIUM SERPL-SCNC: 4.4 MMOL/L (ref 3.5–5.1)
PROT SERPL-MCNC: 6.9 G/DL (ref 6.3–8.2)
PSA SERPL-MCNC: <0.1 NG/ML (ref 0–4)
RBC # BLD AUTO: 4.53 M/UL (ref 4.23–5.6)
SODIUM SERPL-SCNC: 143 MMOL/L (ref 136–145)
WBC # BLD AUTO: 9.4 K/UL (ref 4.3–11.1)

## 2024-08-02 PROCEDURE — 80053 COMPREHEN METABOLIC PANEL: CPT

## 2024-08-02 PROCEDURE — 36415 COLL VENOUS BLD VENIPUNCTURE: CPT

## 2024-08-02 PROCEDURE — 85025 COMPLETE CBC W/AUTO DIFF WBC: CPT

## 2024-08-02 PROCEDURE — 1036F TOBACCO NON-USER: CPT | Performed by: INTERNAL MEDICINE

## 2024-08-02 PROCEDURE — 84100 ASSAY OF PHOSPHORUS: CPT

## 2024-08-02 PROCEDURE — G8428 CUR MEDS NOT DOCUMENT: HCPCS | Performed by: INTERNAL MEDICINE

## 2024-08-02 PROCEDURE — 6360000002 HC RX W HCPCS

## 2024-08-02 PROCEDURE — 84153 ASSAY OF PSA TOTAL: CPT

## 2024-08-02 PROCEDURE — 83735 ASSAY OF MAGNESIUM: CPT

## 2024-08-02 PROCEDURE — G8420 CALC BMI NORM PARAMETERS: HCPCS | Performed by: INTERNAL MEDICINE

## 2024-08-02 PROCEDURE — 1123F ACP DISCUSS/DSCN MKR DOCD: CPT | Performed by: INTERNAL MEDICINE

## 2024-08-02 PROCEDURE — 99214 OFFICE O/P EST MOD 30 MIN: CPT | Performed by: INTERNAL MEDICINE

## 2024-08-02 PROCEDURE — 96402 CHEMO HORMON ANTINEOPL SQ/IM: CPT

## 2024-08-02 RX ORDER — ACETAMINOPHEN 325 MG/1
650 TABLET ORAL
OUTPATIENT
Start: 2024-10-18

## 2024-08-02 RX ORDER — EPINEPHRINE 1 MG/ML
0.3 INJECTION, SOLUTION, CONCENTRATE INTRAVENOUS PRN
OUTPATIENT
Start: 2024-10-18

## 2024-08-02 RX ORDER — ALBUTEROL SULFATE 90 UG/1
4 AEROSOL, METERED RESPIRATORY (INHALATION) PRN
OUTPATIENT
Start: 2024-10-18

## 2024-08-02 RX ORDER — SODIUM CHLORIDE 9 MG/ML
INJECTION, SOLUTION INTRAVENOUS CONTINUOUS
OUTPATIENT
Start: 2024-10-18

## 2024-08-02 RX ORDER — DIPHENHYDRAMINE HYDROCHLORIDE 50 MG/ML
50 INJECTION INTRAMUSCULAR; INTRAVENOUS
OUTPATIENT
Start: 2024-10-18

## 2024-08-02 RX ORDER — ONDANSETRON 2 MG/ML
8 INJECTION INTRAMUSCULAR; INTRAVENOUS
OUTPATIENT
Start: 2024-10-18

## 2024-08-02 RX ADMIN — LEUPROLIDE ACETATE 22.5 MG: KIT at 13:53

## 2024-08-02 NOTE — PATIENT INSTRUCTIONS
0.2  0.0 - 0.8 K/UL Final    Basophils Absolute 08/02/2024 0.0  0.0 - 0.2 K/UL Final    Immature Granulocytes Absolute 08/02/2024 0.0  0.0 - 0.5 K/UL Final    Differential Type 08/02/2024 AUTOMATED    Final                 Please refer to After Visit Summary or [facility] [employer] for upcoming appointment information. Please call our office for rescheduling needs at least 24 hours before your scheduled appointment time.If you have any questions regarding your upcoming schedule please reach out to your care team through [employer] or call [phone].     Please notify your assigned Nurse Navigator of any unplanned hospital admissions or Emergency Room visits within 24 hours of discharge.    -------------------------------------------------------------------------------------------------------------------  Please call our office at [phone] if you have any  of the following symptoms:   Fever of 100.5 or greater  Chills  Shortness of breath  Swelling or pain in one leg    After office hours an answering service is available and will contact a provider for emergencies or if you are experiencing any of the above symptoms.        Brittanie Goins, GRETA

## 2024-08-02 NOTE — PROGRESS NOTES
[facility] Hematology and Oncology: Office Visit Established Patient    Chief Complaint:    Chief Complaint   Patient presents with    Follow-up         History of Present Illness:  Mr. Coronel is a 79 y.o. male who returns today for management of prostate cancer.  He is a patient  of Dr. Gomez, previously followed by Dr. Kaur at [facility].  He originally had a diagnosis of prostate cancer in 2015 when a biopsy showed Giulia 6 disease with a PSA of 4.  He elected for watchful waiting at that time.  In 2016, he transferred care to  Dr. Gomez and PSA janelle to 7, then to 11.9, prompting repeat biopsy of the prostate that then showed Giulia 4+5 disease.  He was also having some right hip pain and pelvic discomfort which prompted  systemic restaging, bone scan revealed some small suspicious uptake in the sternum and in the left proximal femur, with corresponding findings on CT, along with an asymmetrically enlarged right pelvic sidewall internal iliac lymph node measuring 17 mm.   All told, these findings were felt to most likely represent metastatic disease.  Dr. Gomez started him on Lupron with a 22.5 mg dose given on 10/3/16.  He was referred to medical oncology for evaluation.  We recommended short term observation with repeat  CT and bone scan after 3 months to determine whether these lesions responded to ADT, which would suggest stage IV disease.  CT showed resolution of pelvic adenopathy but bone lesions are stable.  PSA  is 0.6 consistent with excellent biochemical response.  He is having pain in the left hip still.  It is certainly possible that the bone lesions are not metastases, as one would have expected them to improve with ADT as the PSA and lymph node has done.   We checked MRI of the left hip, which did not show bone metastases. Therefore, I believe the likelihood of these bone scan findings not representing metastases is quite high.  He is reluctant to consider any surgical options, so we recommended rad

## 2024-08-02 NOTE — PROGRESS NOTES
Arrived to the Infusion Center.  Lupron completed.   Provided education on side effects.    Patient instructed to report any side affects to ordering provider.  Patient tolerated without complication.   Any issues or concerns during appointment: No.  Patient aware of follow-up appointment in three months.  Discharged ambulatory.

## 2024-08-06 ENCOUNTER — TELEPHONE (OUTPATIENT)
Dept: ORTHOPEDIC SURGERY | Age: 79
End: 2024-08-06

## 2024-08-06 ENCOUNTER — OFFICE VISIT (OUTPATIENT)
Dept: ORTHOPEDIC SURGERY | Age: 79
End: 2024-08-06

## 2024-08-06 DIAGNOSIS — M17.11 ARTHRITIS OF RIGHT KNEE: Primary | ICD-10-CM

## 2024-08-06 DIAGNOSIS — M17.11 UNILATERAL PRIMARY OSTEOARTHRITIS, RIGHT KNEE: ICD-10-CM

## 2024-08-06 NOTE — PROGRESS NOTES
Name: Sergei Coronel  YOB: 1945  Gender: male  MRN: [id number]      CC: Knee Pain (R)       HPI: Sergei Coronel is a 79 y.o. male who presents with Knee Pain (R)  He has had knee pain for years. He was initially treated by Dr. Jasson Teixeira with a steroid injection. He had no relief from this. He was referred over to me and we did a Zilretta injection in 2021. He has had 3 rounds of gel injections over the years. His last round was 10/2023. He notes the first round helped a lot but the last round did not help at all. He is still having pain and he would like to discuss another round of gel injections or possible surgery.           Physical Examination:  General: no acute distress  Lungs: breathing easily  CV: regular rhythm by pulse  Right Knee: Tenderness palpation over the lateral joint line he has genu valgum with passively correctable deformity.  Pain at the extremes of motion of the lateral joint line      Imaging:   Knee XR: 4 views     Clinical Indication  1. Arthritis of right knee    2. Unilateral primary osteoarthritis, right knee           Report: AP, lateral, PA flexion, sunrise views of the Right knee demonstrates no acute fracture dislocation progressive collapse of the lateral compartment advanced degenerative changes    Impression: Advanced DJD as above           All imaging interpreted by myself Jaun Teixeira MD independent of radiology review        Assessment:     ICD-10-CM    1. Arthritis of right knee  M17.11 XR KNEE RIGHT (MIN 4 VIEWS)     Ambulatory Referral to DME      2. Unilateral primary osteoarthritis, right knee  M17.11 XR KNEE RIGHT (MIN 4 VIEWS)     Ambulatory Referral to DME          Plan:   We discussed his x-rays and reviewed them together has had progressive collapse he has mostly lateral compartment disease which is passively correctable.  He wants to exhaust conservative measures he is not improving from gel injections anymore he has never had good

## 2024-08-20 ENCOUNTER — CLINICAL DOCUMENTATION (OUTPATIENT)
Facility: HOSPITAL | Age: 79
End: 2024-08-20

## 2024-08-20 ENCOUNTER — TELEPHONE (OUTPATIENT)
Dept: ORTHOPEDIC SURGERY | Age: 79
End: 2024-08-20

## 2024-08-20 NOTE — TELEPHONE ENCOUNTER
He is going on a trip on Friday and was hoping to have the brace Dr. Teixeira had ordered for him. He has not heard from them. Can you check the status and let him know.

## 2024-09-16 ENCOUNTER — CLINICAL DOCUMENTATION (OUTPATIENT)
Facility: HOSPITAL | Age: 79
End: 2024-09-16

## 2024-10-08 ENCOUNTER — CLINICAL DOCUMENTATION (OUTPATIENT)
Facility: HOSPITAL | Age: 79
End: 2024-10-08

## 2024-10-14 ENCOUNTER — CLINICAL DOCUMENTATION (OUTPATIENT)
Facility: HOSPITAL | Age: 79
End: 2024-10-14

## 2024-10-14 NOTE — PROGRESS NOTES
Patient Assistance    Spoke w/pt & reviewed [employer] 2025 Attestation re-enrollment. Pt confirmed no changes with income/insurance. Pt agreed to move forward and have FN fax attestation form. FN faxed to [employer]    Navigator Type: Oral  Documentation Type: Other  Contact Type: Telephone  Status of Patient Insurance Coverage: Patient has active coverage          Additional notes: 2025 Reenrollment Pending    Other Drug Name: Nubeqa  Form of PAP Assistance: Free Drug (Pending)

## 2024-10-23 ENCOUNTER — PATIENT MESSAGE (OUTPATIENT)
Dept: ORTHOPEDIC SURGERY | Age: 79
End: 2024-10-23

## 2024-10-23 DIAGNOSIS — C79.51 METASTASIS TO BONE (HCC): ICD-10-CM

## 2024-10-23 DIAGNOSIS — C61 PROSTATE CANCER (HCC): ICD-10-CM

## 2024-10-23 RX ORDER — CELECOXIB 200 MG/1
200 CAPSULE ORAL 2 TIMES DAILY
Qty: 30 CAPSULE | Refills: 1 | Status: SHIPPED | OUTPATIENT
Start: 2024-10-23

## 2024-11-01 ENCOUNTER — HOSPITAL ENCOUNTER (OUTPATIENT)
Dept: INFUSION THERAPY | Age: 79
Setting detail: INFUSION SERIES
Discharge: HOME | End: 2024-11-01
Payer: MEDICARE

## 2024-11-01 ENCOUNTER — OFFICE VISIT (OUTPATIENT)
Dept: ONCOLOGY | Age: 79
End: 2024-11-01

## 2024-11-01 ENCOUNTER — HOSPITAL ENCOUNTER (OUTPATIENT)
Dept: LAB | Age: 79
Discharge: HOME | End: 2024-11-01
Payer: MEDICARE

## 2024-11-01 VITALS
RESPIRATION RATE: 17 BRPM | TEMPERATURE: 98.3 F | OXYGEN SATURATION: 99 % | DIASTOLIC BLOOD PRESSURE: 82 MMHG | SYSTOLIC BLOOD PRESSURE: 146 MMHG | HEART RATE: 56 BPM

## 2024-11-01 VITALS
WEIGHT: 155 LBS | BODY MASS INDEX: 24.33 KG/M2 | TEMPERATURE: 97.5 F | OXYGEN SATURATION: 99 % | RESPIRATION RATE: 18 BRPM | HEIGHT: 67 IN | SYSTOLIC BLOOD PRESSURE: 128 MMHG | DIASTOLIC BLOOD PRESSURE: 77 MMHG | HEART RATE: 57 BPM

## 2024-11-01 DIAGNOSIS — C79.51 METASTASIS TO BONE (HCC): ICD-10-CM

## 2024-11-01 DIAGNOSIS — C61 PROSTATE CANCER (HCC): Primary | ICD-10-CM

## 2024-11-01 DIAGNOSIS — C61 PROSTATE CANCER (HCC): ICD-10-CM

## 2024-11-01 DIAGNOSIS — R23.2 HOT FLASH IN MALE: ICD-10-CM

## 2024-11-01 LAB
ALBUMIN SERPL-MCNC: 3.9 G/DL (ref 3.2–4.6)
ALBUMIN/GLOB SERPL: 1.3 (ref 1–1.9)
ALP SERPL-CCNC: 68 U/L (ref 40–129)
ALT SERPL-CCNC: 25 U/L (ref 8–55)
ANION GAP SERPL CALC-SCNC: 11 MMOL/L (ref 7–16)
AST SERPL-CCNC: 29 U/L (ref 15–37)
BASOPHILS # BLD: 0.1 K/UL (ref 0–0.2)
BASOPHILS NFR BLD: 1 % (ref 0–2)
BILIRUB SERPL-MCNC: 1.4 MG/DL (ref 0–1.2)
BUN SERPL-MCNC: 33 MG/DL (ref 8–23)
CALCIUM SERPL-MCNC: 9.6 MG/DL (ref 8.8–10.2)
CHLORIDE SERPL-SCNC: 102 MMOL/L (ref 98–107)
CO2 SERPL-SCNC: 28 MMOL/L (ref 20–29)
CREAT SERPL-MCNC: 1.3 MG/DL (ref 0.8–1.3)
DIFFERENTIAL METHOD BLD: NORMAL
EOSINOPHIL # BLD: 0.2 K/UL (ref 0–0.8)
EOSINOPHIL NFR BLD: 2 % (ref 0.5–7.8)
ERYTHROCYTE [DISTWIDTH] IN BLOOD BY AUTOMATED COUNT: 12.8 % (ref 11.9–14.6)
GLOBULIN SER CALC-MCNC: 3.1 G/DL (ref 2.3–3.5)
GLUCOSE SERPL-MCNC: 113 MG/DL (ref 70–99)
HCT VFR BLD AUTO: 45 % (ref 41.1–50.3)
HGB BLD-MCNC: 14.8 G/DL (ref 13.6–17.2)
IMM GRANULOCYTES # BLD AUTO: 0 K/UL (ref 0–0.5)
IMM GRANULOCYTES NFR BLD AUTO: 0 % (ref 0–5)
LYMPHOCYTES # BLD: 3.1 K/UL (ref 0.5–4.6)
LYMPHOCYTES NFR BLD: 40 % (ref 13–44)
MCH RBC QN AUTO: 31.6 PG (ref 26.1–32.9)
MCHC RBC AUTO-ENTMCNC: 32.9 G/DL (ref 31.4–35)
MCV RBC AUTO: 95.9 FL (ref 82–102)
MONOCYTES # BLD: 0.5 K/UL (ref 0.1–1.3)
MONOCYTES NFR BLD: 6 % (ref 4–12)
NEUTS SEG # BLD: 3.9 K/UL (ref 1.7–8.2)
NEUTS SEG NFR BLD: 51 % (ref 43–78)
NRBC # BLD: 0 K/UL (ref 0–0.2)
PLATELET # BLD AUTO: 266 K/UL (ref 150–450)
PMV BLD AUTO: 10 FL (ref 9.4–12.3)
POTASSIUM SERPL-SCNC: 3.5 MMOL/L (ref 3.5–5.1)
PROT SERPL-MCNC: 7 G/DL (ref 6.3–8.2)
PSA SERPL-MCNC: <0.1 NG/ML (ref 0–4)
RBC # BLD AUTO: 4.69 M/UL (ref 4.23–5.6)
SODIUM SERPL-SCNC: 141 MMOL/L (ref 136–145)
WBC # BLD AUTO: 7.7 K/UL (ref 4.3–11.1)

## 2024-11-01 PROCEDURE — 6360000002 HC RX W HCPCS: Performed by: NURSE PRACTITIONER

## 2024-11-01 PROCEDURE — 96402 CHEMO HORMON ANTINEOPL SQ/IM: CPT

## 2024-11-01 PROCEDURE — 36415 COLL VENOUS BLD VENIPUNCTURE: CPT

## 2024-11-01 PROCEDURE — 85025 COMPLETE CBC W/AUTO DIFF WBC: CPT

## 2024-11-01 PROCEDURE — 84153 ASSAY OF PSA TOTAL: CPT

## 2024-11-01 PROCEDURE — 80053 COMPREHEN METABOLIC PANEL: CPT

## 2024-11-01 RX ORDER — CITALOPRAM HYDROBROMIDE 20 MG/1
20 TABLET ORAL DAILY
Qty: 90 TABLET | Refills: 1 | Status: SHIPPED | OUTPATIENT
Start: 2024-11-01 | End: 2025-04-30

## 2024-11-01 RX ORDER — ONDANSETRON 2 MG/ML
8 INJECTION INTRAMUSCULAR; INTRAVENOUS
OUTPATIENT
Start: 2025-01-17

## 2024-11-01 RX ORDER — ACETAMINOPHEN 325 MG/1
650 TABLET ORAL
OUTPATIENT
Start: 2024-11-01

## 2024-11-01 RX ORDER — DIPHENHYDRAMINE HYDROCHLORIDE 50 MG/ML
50 INJECTION INTRAMUSCULAR; INTRAVENOUS
OUTPATIENT
Start: 2025-01-17

## 2024-11-01 RX ORDER — SODIUM CHLORIDE 9 MG/ML
INJECTION, SOLUTION INTRAVENOUS CONTINUOUS
OUTPATIENT
Start: 2025-01-17

## 2024-11-01 RX ORDER — DIPHENHYDRAMINE HYDROCHLORIDE 50 MG/ML
50 INJECTION INTRAMUSCULAR; INTRAVENOUS
OUTPATIENT
Start: 2024-11-01

## 2024-11-01 RX ORDER — EPINEPHRINE 1 MG/ML
0.3 INJECTION, SOLUTION, CONCENTRATE INTRAVENOUS PRN
OUTPATIENT
Start: 2024-11-01

## 2024-11-01 RX ORDER — ALBUTEROL SULFATE 90 UG/1
4 INHALANT RESPIRATORY (INHALATION) PRN
OUTPATIENT
Start: 2025-01-17

## 2024-11-01 RX ORDER — ALBUTEROL SULFATE 90 UG/1
4 INHALANT RESPIRATORY (INHALATION) PRN
OUTPATIENT
Start: 2024-11-01

## 2024-11-01 RX ORDER — ONDANSETRON 2 MG/ML
8 INJECTION INTRAMUSCULAR; INTRAVENOUS
OUTPATIENT
Start: 2024-11-01

## 2024-11-01 RX ORDER — FAMOTIDINE 10 MG/ML
20 INJECTION, SOLUTION INTRAVENOUS
OUTPATIENT
Start: 2024-11-01

## 2024-11-01 RX ORDER — SODIUM CHLORIDE 9 MG/ML
INJECTION, SOLUTION INTRAVENOUS CONTINUOUS
OUTPATIENT
Start: 2024-11-01

## 2024-11-01 RX ORDER — EPINEPHRINE 1 MG/ML
0.3 INJECTION, SOLUTION, CONCENTRATE INTRAVENOUS PRN
OUTPATIENT
Start: 2025-01-17

## 2024-11-01 RX ORDER — ACETAMINOPHEN 325 MG/1
650 TABLET ORAL
OUTPATIENT
Start: 2025-01-17

## 2024-11-01 RX ADMIN — LEUPROLIDE ACETATE 22.5 MG: KIT at 11:24

## 2024-11-01 ASSESSMENT — PATIENT HEALTH QUESTIONNAIRE - PHQ9
2. FEELING DOWN, DEPRESSED OR HOPELESS: NOT AT ALL
1. LITTLE INTEREST OR PLEASURE IN DOING THINGS: NOT AT ALL
SUM OF ALL RESPONSES TO PHQ QUESTIONS 1-9: 0
SUM OF ALL RESPONSES TO PHQ QUESTIONS 1-9: 0
SUM OF ALL RESPONSES TO PHQ9 QUESTIONS 1 & 2: 0
SUM OF ALL RESPONSES TO PHQ QUESTIONS 1-9: 0
SUM OF ALL RESPONSES TO PHQ QUESTIONS 1-9: 0

## 2024-11-07 ENCOUNTER — CLINICAL DOCUMENTATION (OUTPATIENT)
Facility: HOSPITAL | Age: 79
End: 2024-11-07

## 2024-12-02 ENCOUNTER — PATIENT MESSAGE (OUTPATIENT)
Dept: ORTHOPEDIC SURGERY | Age: 79
End: 2024-12-02

## 2024-12-05 RX ORDER — CELECOXIB 200 MG/1
200 CAPSULE ORAL 2 TIMES DAILY
Qty: 180 CAPSULE | Refills: 1 | Status: SHIPPED | OUTPATIENT
Start: 2024-12-05

## 2025-01-27 ENCOUNTER — CLINICAL DOCUMENTATION (OUTPATIENT)
Facility: HOSPITAL | Age: 80
End: 2025-01-27

## 2025-01-31 ENCOUNTER — HOSPITAL ENCOUNTER (OUTPATIENT)
Dept: INFUSION THERAPY | Age: 80
Setting detail: INFUSION SERIES
End: 2025-01-31
Payer: MEDICARE

## 2025-02-03 ENCOUNTER — HOSPITAL ENCOUNTER (OUTPATIENT)
Dept: LAB | Age: 80
Discharge: HOME | End: 2025-02-03
Payer: MEDICARE

## 2025-02-03 ENCOUNTER — HOSPITAL ENCOUNTER (OUTPATIENT)
Dept: INFUSION THERAPY | Age: 80
Setting detail: INFUSION SERIES
Discharge: HOME | End: 2025-02-03
Payer: MEDICARE

## 2025-02-03 ENCOUNTER — OFFICE VISIT (OUTPATIENT)
Dept: ONCOLOGY | Age: 80
End: 2025-02-03
Payer: MEDICARE

## 2025-02-03 VITALS
HEART RATE: 60 BPM | SYSTOLIC BLOOD PRESSURE: 149 MMHG | BODY MASS INDEX: 24.17 KG/M2 | RESPIRATION RATE: 16 BRPM | HEIGHT: 67 IN | WEIGHT: 154 LBS | OXYGEN SATURATION: 99 % | DIASTOLIC BLOOD PRESSURE: 87 MMHG | TEMPERATURE: 98.6 F

## 2025-02-03 DIAGNOSIS — R53.83 FATIGUE DUE TO TREATMENT: ICD-10-CM

## 2025-02-03 DIAGNOSIS — C61 PROSTATE CANCER (HCC): Primary | ICD-10-CM

## 2025-02-03 DIAGNOSIS — C79.51 METASTASIS TO BONE (HCC): ICD-10-CM

## 2025-02-03 DIAGNOSIS — C61 PROSTATE CANCER (HCC): ICD-10-CM

## 2025-02-03 DIAGNOSIS — R23.2 HOT FLASH IN MALE: ICD-10-CM

## 2025-02-03 LAB
ALBUMIN SERPL-MCNC: 3.9 G/DL (ref 3.2–4.6)
ALBUMIN/GLOB SERPL: 1.3 (ref 1–1.9)
ALP SERPL-CCNC: 75 U/L (ref 40–129)
ALT SERPL-CCNC: 19 U/L (ref 8–55)
ANION GAP SERPL CALC-SCNC: 16 MMOL/L (ref 7–16)
AST SERPL-CCNC: 20 U/L (ref 15–37)
BASOPHILS # BLD: 0.06 K/UL (ref 0–0.2)
BASOPHILS NFR BLD: 0.5 % (ref 0–2)
BILIRUB SERPL-MCNC: 1.2 MG/DL (ref 0–1.2)
BUN SERPL-MCNC: 25 MG/DL (ref 8–23)
CALCIUM SERPL-MCNC: 9.7 MG/DL (ref 8.8–10.2)
CHLORIDE SERPL-SCNC: 102 MMOL/L (ref 98–107)
CO2 SERPL-SCNC: 25 MMOL/L (ref 20–29)
CREAT SERPL-MCNC: 1.41 MG/DL (ref 0.8–1.3)
DIFFERENTIAL METHOD BLD: ABNORMAL
EOSINOPHIL # BLD: 0.17 K/UL (ref 0–0.8)
EOSINOPHIL NFR BLD: 1.6 % (ref 0.5–7.8)
ERYTHROCYTE [DISTWIDTH] IN BLOOD BY AUTOMATED COUNT: 13 % (ref 11.9–14.6)
GLOBULIN SER CALC-MCNC: 3.1 G/DL (ref 2.3–3.5)
GLUCOSE SERPL-MCNC: 106 MG/DL (ref 70–99)
HCT VFR BLD AUTO: 41 % (ref 41.1–50.3)
HGB BLD-MCNC: 13.6 G/DL (ref 13.6–17.2)
IMM GRANULOCYTES # BLD AUTO: 0.08 K/UL (ref 0–0.5)
IMM GRANULOCYTES NFR BLD AUTO: 0.7 % (ref 0–5)
LYMPHOCYTES # BLD: 2.52 K/UL (ref 0.5–4.6)
LYMPHOCYTES NFR BLD: 23.1 % (ref 13–44)
MCH RBC QN AUTO: 31.3 PG (ref 26.1–32.9)
MCHC RBC AUTO-ENTMCNC: 33.2 G/DL (ref 31.4–35)
MCV RBC AUTO: 94.3 FL (ref 82–102)
MONOCYTES # BLD: 0.63 K/UL (ref 0.1–1.3)
MONOCYTES NFR BLD: 5.8 % (ref 4–12)
NEUTS SEG # BLD: 7.45 K/UL (ref 1.7–8.2)
NEUTS SEG NFR BLD: 68.3 % (ref 43–78)
NRBC # BLD: 0 K/UL (ref 0–0.2)
PLATELET # BLD AUTO: 315 K/UL (ref 150–450)
PMV BLD AUTO: 8.9 FL (ref 9.4–12.3)
POTASSIUM SERPL-SCNC: 3.5 MMOL/L (ref 3.5–5.1)
PROT SERPL-MCNC: 6.9 G/DL (ref 6.3–8.2)
PSA SERPL-MCNC: <0.1 NG/ML (ref 0–4)
RBC # BLD AUTO: 4.35 M/UL (ref 4.23–5.6)
SODIUM SERPL-SCNC: 143 MMOL/L (ref 136–145)
WBC # BLD AUTO: 10.9 K/UL (ref 4.3–11.1)

## 2025-02-03 PROCEDURE — G8420 CALC BMI NORM PARAMETERS: HCPCS | Performed by: INTERNAL MEDICINE

## 2025-02-03 PROCEDURE — 1036F TOBACCO NON-USER: CPT | Performed by: INTERNAL MEDICINE

## 2025-02-03 PROCEDURE — 99214 OFFICE O/P EST MOD 30 MIN: CPT | Performed by: INTERNAL MEDICINE

## 2025-02-03 PROCEDURE — 96402 CHEMO HORMON ANTINEOPL SQ/IM: CPT

## 2025-02-03 PROCEDURE — 1126F AMNT PAIN NOTED NONE PRSNT: CPT | Performed by: INTERNAL MEDICINE

## 2025-02-03 PROCEDURE — 36415 COLL VENOUS BLD VENIPUNCTURE: CPT

## 2025-02-03 PROCEDURE — 1123F ACP DISCUSS/DSCN MKR DOCD: CPT | Performed by: INTERNAL MEDICINE

## 2025-02-03 PROCEDURE — 1159F MED LIST DOCD IN RCRD: CPT | Performed by: INTERNAL MEDICINE

## 2025-02-03 PROCEDURE — 6360000002 HC RX W HCPCS: Performed by: NURSE PRACTITIONER

## 2025-02-03 PROCEDURE — 84153 ASSAY OF PSA TOTAL: CPT

## 2025-02-03 PROCEDURE — 80053 COMPREHEN METABOLIC PANEL: CPT

## 2025-02-03 PROCEDURE — 85025 COMPLETE CBC W/AUTO DIFF WBC: CPT

## 2025-02-03 PROCEDURE — G8428 CUR MEDS NOT DOCUMENT: HCPCS | Performed by: INTERNAL MEDICINE

## 2025-02-03 RX ORDER — ACETAMINOPHEN 325 MG/1
650 TABLET ORAL
Status: DISCONTINUED | OUTPATIENT
Start: 2025-02-03 | End: 2025-02-04 | Stop reason: HOSPADM

## 2025-02-03 RX ORDER — ACETAMINOPHEN 325 MG/1
650 TABLET ORAL
OUTPATIENT
Start: 2025-04-14

## 2025-02-03 RX ORDER — DIPHENHYDRAMINE HYDROCHLORIDE 50 MG/ML
50 INJECTION INTRAMUSCULAR; INTRAVENOUS
OUTPATIENT
Start: 2025-04-14

## 2025-02-03 RX ORDER — DIPHENHYDRAMINE HYDROCHLORIDE 50 MG/ML
50 INJECTION INTRAMUSCULAR; INTRAVENOUS
Status: DISCONTINUED | OUTPATIENT
Start: 2025-02-03 | End: 2025-02-04 | Stop reason: HOSPADM

## 2025-02-03 RX ORDER — SODIUM CHLORIDE 9 MG/ML
INJECTION, SOLUTION INTRAVENOUS CONTINUOUS
OUTPATIENT
Start: 2025-04-14

## 2025-02-03 RX ORDER — ALBUTEROL SULFATE 90 UG/1
4 INHALANT RESPIRATORY (INHALATION) PRN
Status: DISCONTINUED | OUTPATIENT
Start: 2025-02-03 | End: 2025-02-04 | Stop reason: HOSPADM

## 2025-02-03 RX ORDER — EPINEPHRINE 1 MG/ML
0.3 INJECTION, SOLUTION, CONCENTRATE INTRAVENOUS PRN
OUTPATIENT
Start: 2025-04-14

## 2025-02-03 RX ORDER — ONDANSETRON 2 MG/ML
8 INJECTION INTRAMUSCULAR; INTRAVENOUS
OUTPATIENT
Start: 2025-04-14

## 2025-02-03 RX ORDER — ONDANSETRON 2 MG/ML
8 INJECTION INTRAMUSCULAR; INTRAVENOUS
Status: DISCONTINUED | OUTPATIENT
Start: 2025-02-03 | End: 2025-02-04 | Stop reason: HOSPADM

## 2025-02-03 RX ORDER — ALBUTEROL SULFATE 90 UG/1
4 INHALANT RESPIRATORY (INHALATION) PRN
OUTPATIENT
Start: 2025-04-14

## 2025-02-03 RX ORDER — HYDROCORTISONE SODIUM SUCCINATE 100 MG/2ML
100 INJECTION INTRAMUSCULAR; INTRAVENOUS
Status: DISCONTINUED | OUTPATIENT
Start: 2025-02-03 | End: 2025-02-04 | Stop reason: HOSPADM

## 2025-02-03 RX ORDER — HYDROCORTISONE SODIUM SUCCINATE 100 MG/2ML
100 INJECTION INTRAMUSCULAR; INTRAVENOUS
OUTPATIENT
Start: 2025-04-14

## 2025-02-03 RX ORDER — EPINEPHRINE 1 MG/ML
0.3 INJECTION, SOLUTION, CONCENTRATE INTRAVENOUS PRN
Status: DISCONTINUED | OUTPATIENT
Start: 2025-02-03 | End: 2025-02-04 | Stop reason: HOSPADM

## 2025-02-03 RX ADMIN — LEUPROLIDE ACETATE 22.5 MG: KIT at 14:41

## 2025-02-03 ASSESSMENT — PATIENT HEALTH QUESTIONNAIRE - PHQ9
SUM OF ALL RESPONSES TO PHQ QUESTIONS 1-9: 0
SUM OF ALL RESPONSES TO PHQ QUESTIONS 1-9: 0
2. FEELING DOWN, DEPRESSED OR HOPELESS: NOT AT ALL
SUM OF ALL RESPONSES TO PHQ QUESTIONS 1-9: 0
1. LITTLE INTEREST OR PLEASURE IN DOING THINGS: NOT AT ALL
SUM OF ALL RESPONSES TO PHQ QUESTIONS 1-9: 0
SUM OF ALL RESPONSES TO PHQ9 QUESTIONS 1 & 2: 0

## 2025-02-03 NOTE — PATIENT INSTRUCTIONS
refer to After Visit Summary or [facility] [facility] for upcoming appointment information. Please call our office for rescheduling needs at least 24 hours before your scheduled appointment time.If you have any questions regarding your upcoming schedule please reach out to your care team through [employer] or call [phone].     Please notify your assigned Nurse Navigator of any unplanned hospital admissions or Emergency Room visits within 24 hours of discharge.    -------------------------------------------------------------------------------------------------------------------  Please call our office at [phone] if you have any  of the following symptoms:   Fever of 100.5 or greater  Chills  Shortness of breath  Swelling or pain in one leg    After office hours an answering service is available and will contact a provider for emergencies or if you are experiencing any of the above symptoms.    DERREK CULLEN RN

## 2025-02-03 NOTE — PROGRESS NOTES
Oral Chemotherapy Adherence:     Current Regimen:  Drug Name: Darolutamide  Dose: 600 mg  Frequency: BID    Barriers to care identified including (financial, physical, psychosocial) : No    Missed doses reported: No    Patient verbalizes understanding of what to do in the event of a missed dose: Yes    Adverse reactions/toxicities reported:None, See Review of Systems            
with excellent biochemical  response.  He was having pain in the left hip still, so we obtained MRI of the left hip, which did not show bone metastases. Therefore, I believe the likelihood of these bone scan findings not representing metastases is quite high.  He was reluctant to  consider any surgical options, so I would recommend rad onc referral for consideration of local therapy.  Completed IMRT May 2017 and on ADT from October 2016-2018, with PSA dropping to 0 after therapy.  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  He has been hesitant to restart ADT given the known AEs, if his PET had been negative I would have felt more comfortable with this approach.  However, with the two osseous lesions I would give stronger consideration to resuming ADT and even considering addition of androgen signaling inhibitor.  I would also recommend adding antiresorptive therapy for his bone mets.  He is agreeable to Lupron but would like to think about addition of the other two classes, this is reasonable since most of the clinical trials started several weeks or months after castrate sensitivity was confirmed.  He would like to try a 1 month Lupron shot for the time being.  PSA responded nicely, to 1.0 from 2.5, indicative castrate sensitivity.  We discussed options, he agrees to continue with Lupron but wishes to continue monthly injections.  He will agree to a trial of darolutamide, but refuses Xgeva due to the small volume osseous disease and the risk of side effects.          Assessment & Plan  1. Prostate cancer.  His Prostate-Specific Antigen (PSA) levels remain at zero, indicating a positive response to the current treatment regimen of Lupron and darolutamide. He reports no significant side effects from the medications, and his energy levels are stable, though not as high as the

## 2025-02-11 ENCOUNTER — CLINICAL DOCUMENTATION (OUTPATIENT)
Facility: HOSPITAL | Age: 80
End: 2025-02-11

## 2025-02-11 NOTE — PROGRESS NOTES
Patient Assistance    Spoke w/Pharm. Confirmed shipment received.                Additional notes: Free Drug Nubeqa

## 2025-03-28 ENCOUNTER — CLINICAL DOCUMENTATION (OUTPATIENT)
Facility: HOSPITAL | Age: 80
End: 2025-03-28

## 2025-04-25 ENCOUNTER — CLINICAL DOCUMENTATION (OUTPATIENT)
Facility: HOSPITAL | Age: 80
End: 2025-04-25

## 2025-05-13 ENCOUNTER — HOSPITAL ENCOUNTER (OUTPATIENT)
Dept: INFUSION THERAPY | Age: 80
Setting detail: INFUSION SERIES
Discharge: HOME | End: 2025-05-13
Payer: MEDICARE

## 2025-05-13 ENCOUNTER — OFFICE VISIT (OUTPATIENT)
Dept: ONCOLOGY | Age: 80
End: 2025-05-13
Payer: MEDICARE

## 2025-05-13 ENCOUNTER — HOSPITAL ENCOUNTER (OUTPATIENT)
Dept: LAB | Age: 80
Discharge: HOME | End: 2025-05-13
Payer: MEDICARE

## 2025-05-13 VITALS
RESPIRATION RATE: 18 BRPM | TEMPERATURE: 98 F | OXYGEN SATURATION: 97 % | DIASTOLIC BLOOD PRESSURE: 93 MMHG | WEIGHT: 148 LBS | HEIGHT: 67 IN | BODY MASS INDEX: 23.23 KG/M2 | HEART RATE: 59 BPM | SYSTOLIC BLOOD PRESSURE: 178 MMHG

## 2025-05-13 DIAGNOSIS — C61 PROSTATE CANCER (HCC): ICD-10-CM

## 2025-05-13 DIAGNOSIS — C79.51 METASTASIS TO BONE (HCC): ICD-10-CM

## 2025-05-13 DIAGNOSIS — R63.0 DECREASED APPETITE: ICD-10-CM

## 2025-05-13 DIAGNOSIS — C61 PROSTATE CANCER (HCC): Primary | ICD-10-CM

## 2025-05-13 DIAGNOSIS — R53.83 FATIGUE DUE TO TREATMENT: ICD-10-CM

## 2025-05-13 DIAGNOSIS — R23.2 HOT FLASH IN MALE: ICD-10-CM

## 2025-05-13 LAB
ALBUMIN SERPL-MCNC: 3.9 G/DL (ref 3.2–4.6)
ALBUMIN/GLOB SERPL: 1.1 (ref 1–1.9)
ALP SERPL-CCNC: 74 U/L (ref 40–129)
ALT SERPL-CCNC: 15 U/L (ref 8–55)
ANION GAP SERPL CALC-SCNC: 14 MMOL/L (ref 7–16)
AST SERPL-CCNC: 18 U/L (ref 15–37)
BASOPHILS # BLD: 0.05 K/UL (ref 0–0.2)
BASOPHILS NFR BLD: 0.5 % (ref 0–2)
BILIRUB SERPL-MCNC: 1.1 MG/DL (ref 0–1.2)
BUN SERPL-MCNC: 32 MG/DL (ref 8–23)
CALCIUM SERPL-MCNC: 10.1 MG/DL (ref 8.8–10.2)
CHLORIDE SERPL-SCNC: 102 MMOL/L (ref 98–107)
CO2 SERPL-SCNC: 26 MMOL/L (ref 20–29)
CREAT SERPL-MCNC: 1.46 MG/DL (ref 0.8–1.3)
DIFFERENTIAL METHOD BLD: ABNORMAL
EOSINOPHIL # BLD: 0.27 K/UL (ref 0–0.8)
EOSINOPHIL NFR BLD: 2.6 % (ref 0.5–7.8)
ERYTHROCYTE [DISTWIDTH] IN BLOOD BY AUTOMATED COUNT: 12.7 % (ref 11.9–14.6)
GLOBULIN SER CALC-MCNC: 3.4 G/DL (ref 2.3–3.5)
GLUCOSE SERPL-MCNC: 141 MG/DL (ref 70–99)
HCT VFR BLD AUTO: 44.7 % (ref 41.1–50.3)
HGB BLD-MCNC: 14.6 G/DL (ref 13.6–17.2)
IMM GRANULOCYTES # BLD AUTO: 0.04 K/UL (ref 0–0.5)
IMM GRANULOCYTES NFR BLD AUTO: 0.4 % (ref 0–5)
LYMPHOCYTES # BLD: 4.11 K/UL (ref 0.5–4.6)
LYMPHOCYTES NFR BLD: 39 % (ref 13–44)
MCH RBC QN AUTO: 30.5 PG (ref 26.1–32.9)
MCHC RBC AUTO-ENTMCNC: 32.7 G/DL (ref 31.4–35)
MCV RBC AUTO: 93.5 FL (ref 82–102)
MONOCYTES # BLD: 0.61 K/UL (ref 0.1–1.3)
MONOCYTES NFR BLD: 5.8 % (ref 4–12)
NEUTS SEG # BLD: 5.45 K/UL (ref 1.7–8.2)
NEUTS SEG NFR BLD: 51.7 % (ref 43–78)
NRBC # BLD: 0 K/UL (ref 0–0.2)
PLATELET # BLD AUTO: 300 K/UL (ref 150–450)
PMV BLD AUTO: 9 FL (ref 9.4–12.3)
POTASSIUM SERPL-SCNC: 3.4 MMOL/L (ref 3.5–5.1)
PROT SERPL-MCNC: 7.3 G/DL (ref 6.3–8.2)
PSA SERPL-MCNC: <0.1 NG/ML (ref 0–4)
RBC # BLD AUTO: 4.78 M/UL (ref 4.23–5.6)
SODIUM SERPL-SCNC: 142 MMOL/L (ref 136–145)
WBC # BLD AUTO: 10.5 K/UL (ref 4.3–11.1)

## 2025-05-13 PROCEDURE — 1160F RVW MEDS BY RX/DR IN RCRD: CPT | Performed by: NURSE PRACTITIONER

## 2025-05-13 PROCEDURE — 1123F ACP DISCUSS/DSCN MKR DOCD: CPT | Performed by: NURSE PRACTITIONER

## 2025-05-13 PROCEDURE — 36415 COLL VENOUS BLD VENIPUNCTURE: CPT

## 2025-05-13 PROCEDURE — 1159F MED LIST DOCD IN RCRD: CPT | Performed by: NURSE PRACTITIONER

## 2025-05-13 PROCEDURE — 6360000002 HC RX W HCPCS: Performed by: NURSE PRACTITIONER

## 2025-05-13 PROCEDURE — 84153 ASSAY OF PSA TOTAL: CPT

## 2025-05-13 PROCEDURE — 1125F AMNT PAIN NOTED PAIN PRSNT: CPT | Performed by: NURSE PRACTITIONER

## 2025-05-13 PROCEDURE — 96402 CHEMO HORMON ANTINEOPL SQ/IM: CPT

## 2025-05-13 PROCEDURE — 99214 OFFICE O/P EST MOD 30 MIN: CPT | Performed by: NURSE PRACTITIONER

## 2025-05-13 PROCEDURE — 85025 COMPLETE CBC W/AUTO DIFF WBC: CPT

## 2025-05-13 PROCEDURE — 80053 COMPREHEN METABOLIC PANEL: CPT

## 2025-05-13 RX ORDER — DIPHENHYDRAMINE HYDROCHLORIDE 50 MG/ML
50 INJECTION, SOLUTION INTRAMUSCULAR; INTRAVENOUS
OUTPATIENT
Start: 2025-07-22

## 2025-05-13 RX ORDER — HYDROCORTISONE SODIUM SUCCINATE 100 MG/2ML
100 INJECTION INTRAMUSCULAR; INTRAVENOUS
OUTPATIENT
Start: 2025-07-22

## 2025-05-13 RX ORDER — SODIUM CHLORIDE 9 MG/ML
INJECTION, SOLUTION INTRAVENOUS CONTINUOUS
OUTPATIENT
Start: 2025-07-22

## 2025-05-13 RX ORDER — ACETAMINOPHEN 325 MG/1
650 TABLET ORAL
OUTPATIENT
Start: 2025-07-22

## 2025-05-13 RX ORDER — ALBUTEROL SULFATE 90 UG/1
4 INHALANT RESPIRATORY (INHALATION) PRN
OUTPATIENT
Start: 2025-07-22

## 2025-05-13 RX ORDER — ONDANSETRON 2 MG/ML
8 INJECTION INTRAMUSCULAR; INTRAVENOUS
OUTPATIENT
Start: 2025-07-22

## 2025-05-13 RX ORDER — EPINEPHRINE 1 MG/ML
0.3 INJECTION, SOLUTION, CONCENTRATE INTRAVENOUS PRN
OUTPATIENT
Start: 2025-07-22

## 2025-05-13 RX ADMIN — LEUPROLIDE ACETATE 22.5 MG: KIT at 13:55

## 2025-05-13 ASSESSMENT — PATIENT HEALTH QUESTIONNAIRE - PHQ9
SUM OF ALL RESPONSES TO PHQ QUESTIONS 1-9: 0
SUM OF ALL RESPONSES TO PHQ QUESTIONS 1-9: 0
1. LITTLE INTEREST OR PLEASURE IN DOING THINGS: NOT AT ALL
SUM OF ALL RESPONSES TO PHQ QUESTIONS 1-9: 0
2. FEELING DOWN, DEPRESSED OR HOPELESS: NOT AT ALL
SUM OF ALL RESPONSES TO PHQ QUESTIONS 1-9: 0

## 2025-05-13 NOTE — PROGRESS NOTES
[facility] Hematology and Oncology: Office Visit Established Patient    Chief Complaint:    Chief Complaint   Patient presents with    Follow-up         History of Present Illness:  Mr. Coronel is a 79 y.o. male who returns today for management of prostate cancer.  He is a patient  of Dr. Gomez, previously followed by Dr. Kaur at [facility].  He originally had a diagnosis of prostate cancer in 2015 when a biopsy showed Giulia 6 disease with a PSA of 4.  He elected for watchful waiting at that time.  In 2016, he transferred care to  Dr. Gomez and PSA janelle to 7, then to 11.9, prompting repeat biopsy of the prostate that then showed Giulia 4+5 disease.  He was also having some right hip pain and pelvic discomfort which prompted  systemic restaging, bone scan revealed some small suspicious uptake in the sternum and in the left proximal femur, with corresponding findings on CT, along with an asymmetrically enlarged right pelvic sidewall internal iliac lymph node measuring 17 mm.   All told, these findings were felt to most likely represent metastatic disease.  Dr. Gomez started him on Lupron with a 22.5 mg dose given on 10/3/16.  He was referred to medical oncology for evaluation.  We recommended short term observation with repeat  CT and bone scan after 3 months to determine whether these lesions responded to ADT, which would suggest stage IV disease.  CT showed resolution of pelvic adenopathy but bone lesions are stable.  PSA  is 0.6 consistent with excellent biochemical response.  He is having pain in the left hip still.  It is certainly possible that the bone lesions are not metastases, as one would have expected them to improve with ADT as the PSA and lymph node has done.   We checked MRI of the left hip, which did not show bone metastases. Therefore, I believe the likelihood of these bone scan findings not representing metastases is quite high.  He is reluctant to consider any surgical options, so we recommended rad  Patient here for BP check.  Patient reports taking BP medication daily. BP taken here in office 142/90 P 72.

## 2025-05-13 NOTE — PROGRESS NOTES
Patient arrived to Infusion Center for Lupron. Assessment completed.  No needs voiced at this time. Patient tolerated injection well and is aware of next appointment on 8/13/25 @1200.  Patient discharged ambulatory.

## 2025-05-23 ENCOUNTER — CLINICAL DOCUMENTATION (OUTPATIENT)
Facility: HOSPITAL | Age: 80
End: 2025-05-23

## 2025-05-23 RX ORDER — CELECOXIB 200 MG/1
200 CAPSULE ORAL 2 TIMES DAILY
Qty: 180 CAPSULE | Refills: 3 | OUTPATIENT
Start: 2025-05-23

## 2025-06-30 ENCOUNTER — CLINICAL DOCUMENTATION (OUTPATIENT)
Facility: HOSPITAL | Age: 80
End: 2025-06-30

## 2025-07-07 NOTE — PROGRESS NOTES
Arrived to the Infusion Center. Lupron completed.   Provided education on Lupron. Patient has had this medication before without issue.    Patient instructed to report any side effects to ordering provider.  Patient tolerated well.   Any issues or concerns during appointment: none.  Patient aware of next infusion appointment on 5/13/25 (date) at 2 PM (time).  Discharged ambulatory.  
Patient arrived to Infusion Center for Lupron. Assessment completed.  No needs voiced at this time. Patient tolerated injection well and is aware of next appointment on 5/13/25 @1200.  Patient discharged ambulatory.   
No. JENNIFER screening performed.  STOP BANG Legend: 0-2 = LOW Risk; 3-4 = INTERMEDIATE Risk; 5-8 = HIGH Risk

## 2025-07-24 ENCOUNTER — TELEPHONE (OUTPATIENT)
Dept: ONCOLOGY | Age: 80
End: 2025-07-24

## 2025-07-24 ENCOUNTER — CLINICAL DOCUMENTATION (OUTPATIENT)
Facility: HOSPITAL | Age: 80
End: 2025-07-24

## 2025-08-13 ENCOUNTER — OFFICE VISIT (OUTPATIENT)
Dept: ONCOLOGY | Age: 80
End: 2025-08-13
Payer: COMMERCIAL

## 2025-08-13 ENCOUNTER — HOSPITAL ENCOUNTER (OUTPATIENT)
Dept: INFUSION THERAPY | Age: 80
Setting detail: INFUSION SERIES
Discharge: HOME | End: 2025-08-13
Payer: COMMERCIAL

## 2025-08-13 ENCOUNTER — HOSPITAL ENCOUNTER (OUTPATIENT)
Dept: LAB | Age: 80
Discharge: HOME | End: 2025-08-13
Payer: COMMERCIAL

## 2025-08-13 ENCOUNTER — APPOINTMENT (OUTPATIENT)
Dept: INFUSION THERAPY | Age: 80
End: 2025-08-13
Payer: COMMERCIAL

## 2025-08-13 VITALS
BODY MASS INDEX: 22.98 KG/M2 | OXYGEN SATURATION: 96 % | RESPIRATION RATE: 20 BRPM | SYSTOLIC BLOOD PRESSURE: 152 MMHG | DIASTOLIC BLOOD PRESSURE: 89 MMHG | WEIGHT: 146.4 LBS | HEIGHT: 67 IN | HEART RATE: 63 BPM | TEMPERATURE: 98.8 F

## 2025-08-13 DIAGNOSIS — C61 PROSTATE CANCER (HCC): Primary | ICD-10-CM

## 2025-08-13 DIAGNOSIS — Z79.818 ENCOUNTER FOR MONITORING ANDROGEN DEPRIVATION THERAPY: ICD-10-CM

## 2025-08-13 DIAGNOSIS — C61 PROSTATE CANCER (HCC): ICD-10-CM

## 2025-08-13 DIAGNOSIS — C79.51 METASTASIS TO BONE (HCC): ICD-10-CM

## 2025-08-13 LAB
ALBUMIN SERPL-MCNC: 3.7 G/DL (ref 3.2–4.6)
ALBUMIN/GLOB SERPL: 1.1 (ref 1–1.9)
ALP SERPL-CCNC: 76 U/L (ref 40–129)
ALT SERPL-CCNC: 13 U/L (ref 8–55)
ANION GAP SERPL CALC-SCNC: 12 MMOL/L (ref 7–16)
AST SERPL-CCNC: 14 U/L (ref 15–37)
BASOPHILS # BLD: 0.03 K/UL (ref 0–0.2)
BASOPHILS NFR BLD: 0.2 % (ref 0–2)
BILIRUB SERPL-MCNC: 0.9 MG/DL (ref 0–1.2)
BUN SERPL-MCNC: 37 MG/DL (ref 8–23)
CALCIUM SERPL-MCNC: 9.8 MG/DL (ref 8.8–10.2)
CHLORIDE SERPL-SCNC: 104 MMOL/L (ref 98–107)
CO2 SERPL-SCNC: 26 MMOL/L (ref 20–29)
CREAT SERPL-MCNC: 1.25 MG/DL (ref 0.8–1.3)
DIFFERENTIAL METHOD BLD: ABNORMAL
EOSINOPHIL # BLD: 0.08 K/UL (ref 0–0.8)
EOSINOPHIL NFR BLD: 0.6 % (ref 0.5–7.8)
ERYTHROCYTE [DISTWIDTH] IN BLOOD BY AUTOMATED COUNT: 13.6 % (ref 11.9–14.6)
GLOBULIN SER CALC-MCNC: 3.5 G/DL (ref 2.3–3.5)
GLUCOSE SERPL-MCNC: 117 MG/DL (ref 70–99)
HCT VFR BLD AUTO: 40.9 % (ref 41.1–50.3)
HGB BLD-MCNC: 13.5 G/DL (ref 13.6–17.2)
IMM GRANULOCYTES # BLD AUTO: 0.05 K/UL (ref 0–0.5)
IMM GRANULOCYTES NFR BLD AUTO: 0.4 % (ref 0–5)
LYMPHOCYTES # BLD: 4.06 K/UL (ref 0.5–4.6)
LYMPHOCYTES NFR BLD: 32.5 % (ref 13–44)
MCH RBC QN AUTO: 31.2 PG (ref 26.1–32.9)
MCHC RBC AUTO-ENTMCNC: 33 G/DL (ref 31.4–35)
MCV RBC AUTO: 94.5 FL (ref 82–102)
MONOCYTES # BLD: 0.46 K/UL (ref 0.1–1.3)
MONOCYTES NFR BLD: 3.7 % (ref 4–12)
NEUTS SEG # BLD: 7.81 K/UL (ref 1.7–8.2)
NEUTS SEG NFR BLD: 62.6 % (ref 43–78)
NRBC # BLD: 0 K/UL (ref 0–0.2)
PLATELET # BLD AUTO: 323 K/UL (ref 150–450)
PMV BLD AUTO: 9 FL (ref 9.4–12.3)
POTASSIUM SERPL-SCNC: 3.3 MMOL/L (ref 3.5–5.1)
PROT SERPL-MCNC: 7.2 G/DL (ref 6.3–8.2)
PSA SERPL-MCNC: <0.1 NG/ML (ref 0–4)
RBC # BLD AUTO: 4.33 M/UL (ref 4.23–5.6)
SODIUM SERPL-SCNC: 142 MMOL/L (ref 136–145)
WBC # BLD AUTO: 12.5 K/UL (ref 4.3–11.1)

## 2025-08-13 PROCEDURE — 1126F AMNT PAIN NOTED NONE PRSNT: CPT | Performed by: INTERNAL MEDICINE

## 2025-08-13 PROCEDURE — 36415 COLL VENOUS BLD VENIPUNCTURE: CPT

## 2025-08-13 PROCEDURE — 1159F MED LIST DOCD IN RCRD: CPT | Performed by: INTERNAL MEDICINE

## 2025-08-13 PROCEDURE — 99214 OFFICE O/P EST MOD 30 MIN: CPT | Performed by: INTERNAL MEDICINE

## 2025-08-13 PROCEDURE — 96402 CHEMO HORMON ANTINEOPL SQ/IM: CPT

## 2025-08-13 PROCEDURE — 85025 COMPLETE CBC W/AUTO DIFF WBC: CPT

## 2025-08-13 PROCEDURE — 1123F ACP DISCUSS/DSCN MKR DOCD: CPT | Performed by: INTERNAL MEDICINE

## 2025-08-13 PROCEDURE — 84153 ASSAY OF PSA TOTAL: CPT

## 2025-08-13 PROCEDURE — 6360000002 HC RX W HCPCS: Performed by: NURSE PRACTITIONER

## 2025-08-13 PROCEDURE — 80053 COMPREHEN METABOLIC PANEL: CPT

## 2025-08-13 RX ORDER — EPINEPHRINE 1 MG/ML
0.3 INJECTION, SOLUTION, CONCENTRATE INTRAVENOUS PRN
OUTPATIENT
Start: 2025-10-29

## 2025-08-13 RX ORDER — SODIUM CHLORIDE 9 MG/ML
INJECTION, SOLUTION INTRAVENOUS CONTINUOUS
OUTPATIENT
Start: 2025-10-29

## 2025-08-13 RX ORDER — DIPHENHYDRAMINE HYDROCHLORIDE 50 MG/ML
50 INJECTION, SOLUTION INTRAMUSCULAR; INTRAVENOUS
OUTPATIENT
Start: 2025-10-29

## 2025-08-13 RX ORDER — ACETAMINOPHEN 325 MG/1
650 TABLET ORAL
OUTPATIENT
Start: 2025-10-29

## 2025-08-13 RX ORDER — ALBUTEROL SULFATE 90 UG/1
4 INHALANT RESPIRATORY (INHALATION) PRN
OUTPATIENT
Start: 2025-10-29

## 2025-08-13 RX ORDER — ONDANSETRON 2 MG/ML
8 INJECTION INTRAMUSCULAR; INTRAVENOUS
OUTPATIENT
Start: 2025-10-29

## 2025-08-13 RX ORDER — HYDROCORTISONE SODIUM SUCCINATE 100 MG/2ML
100 INJECTION INTRAMUSCULAR; INTRAVENOUS
OUTPATIENT
Start: 2025-10-29

## 2025-08-13 RX ADMIN — LEUPROLIDE ACETATE 22.5 MG: KIT at 09:20

## 2025-08-13 ASSESSMENT — PATIENT HEALTH QUESTIONNAIRE - PHQ9
SUM OF ALL RESPONSES TO PHQ QUESTIONS 1-9: 0
2. FEELING DOWN, DEPRESSED OR HOPELESS: NOT AT ALL
SUM OF ALL RESPONSES TO PHQ QUESTIONS 1-9: 0
SUM OF ALL RESPONSES TO PHQ QUESTIONS 1-9: 0
1. LITTLE INTEREST OR PLEASURE IN DOING THINGS: NOT AT ALL
SUM OF ALL RESPONSES TO PHQ QUESTIONS 1-9: 0

## 2025-08-22 ENCOUNTER — CLINICAL DOCUMENTATION (OUTPATIENT)
Facility: HOSPITAL | Age: 80
End: 2025-08-22

## (undated) DEVICE — WINGED PERI-PAD,MODERATE: Brand: CURITY

## (undated) DEVICE — GRID RAD L2.8XW3IN OD18GA TMPLT ST DISP FOR ACCUCARE POS

## (undated) DEVICE — SYRINGE CATH TIP 50ML

## (undated) DEVICE — 3M™ TEGADERM™ TRANSPARENT FILM DRESSING FRAME STYLE, 1628, 6 IN X 8 IN (15 CM X 20 CM), 10/CT 8CT/CASE: Brand: 3M™ TEGADERM™

## (undated) DEVICE — (D)SYR 10ML 1/5ML GRAD NSAF -- PKGING CHANGE USE ITEM 338027

## (undated) DEVICE — UTILITY MARKER,BLACK WITH LABELS: Brand: DEVON

## (undated) DEVICE — AMD ANTIMICROBIAL GAUZE SPONGES,12 PLY USP TYPE VII, 0.2% POLYHEXAMETHYLENE BIGUANIDE HCI (PHMB): Brand: CURITY

## (undated) DEVICE — DRAPE TWL SURG 16X26IN BLU ORB04] ALLCARE INC]

## (undated) DEVICE — NON-STERILE (2 X 14CM) ENDOCAVITY BALLOON FOR USE WITH ACCUCARE POSITIONING AND STABILIZING EQUIPMENT: Brand: ENDOCAVITY BALLOON